# Patient Record
Sex: MALE | Race: WHITE | NOT HISPANIC OR LATINO | Employment: OTHER | URBAN - METROPOLITAN AREA
[De-identification: names, ages, dates, MRNs, and addresses within clinical notes are randomized per-mention and may not be internally consistent; named-entity substitution may affect disease eponyms.]

---

## 2017-01-06 ENCOUNTER — TELEPHONE (OUTPATIENT)
Dept: FAMILY MEDICINE | Facility: CLINIC | Age: 37
End: 2017-01-06

## 2017-01-06 ENCOUNTER — OFFICE VISIT (OUTPATIENT)
Dept: ORTHOPEDICS | Facility: CLINIC | Age: 37
End: 2017-01-06
Payer: COMMERCIAL

## 2017-01-06 VITALS — BODY MASS INDEX: 51.48 KG/M2 | WEIGHT: 309 LBS | TEMPERATURE: 98.5 F | HEIGHT: 65 IN

## 2017-01-06 DIAGNOSIS — M19.019 AC JOINT ARTHROPATHY: Primary | ICD-10-CM

## 2017-01-06 PROCEDURE — 99212 OFFICE O/P EST SF 10 MIN: CPT | Performed by: ORTHOPAEDIC SURGERY

## 2017-01-06 NOTE — PROGRESS NOTES
"HISTORY OF PRESENT ILLNESS:    Waqas Allen is a 36 year old male who is seen in follow up for   Chief Complaint   Patient presents with     RECHECK     Discuss right shoulder surgery.   Present symptoms: Patient had left shoulder surgery done last year and is feeling very well. He was hopeful to get his right shoulder done prior to the end of the year but he was too busy with work. He is no hopeful to get it done before the end of January so he has time to recover before his work season becomes busy again.  Patient states he has maintained range of motion but does have discomfort.  Family present:  Wife  Patient evaluation done with Dr. Duckworth    Physical Exam:  Vitals: Temp(Src) 98.5  F (36.9  C) (Temporal)  Ht 1.638 m (5' 4.5\")  Wt 140.161 kg (309 lb)  BMI 52.24 kg/m2  BMI= Body mass index is 52.24 kg/(m^2).  Constitutional: healthy, alert and no acute distress   Psychiatric: mentation appears normal and affect normal/bright  NEURO: no focal deficits  SKIN: no excoriation or erythema. No signs of infection.  JOINT/EXTREMITIES:  Affected extremity pulses are easily palpable.  SHOULDER Exam-Right   Inspection: No asymmetry   Tenderness of: A.c. Joint on the right. Patient has very limited tenderness on the left over the a.c. joint   Range of Motion: Active- Patient was symmetrical motion bilaterally. He is able to raise both arms overhead to approximately 160  limited by habitus Abduction is similar. External rotation bilaterally to 60 .Internal rotation bilaterally high lumbar   Strength: Patient maintains good strength     IMAGING INTERPRETATION:  Right shoulder X-rays were reviewed. Patient with some subacromial spurring.  There is a gap within the a.c. Joint however it is minimal  Independent visualization of the images was performed.     ASSESSMENT:    Chief Complaint   Patient presents with     RECHECK     Discuss right shoulder surgery.       ICD-10-CM    1. AC joint arthropathy M25.9      Patient with " right shoulder a.c. Joint arthropathy. Patient had the previous issue going on on his left side which was improved with subacromial decompression and distal clavicle excision.  His range of motion is good. Patient with painful range of motion however.    Plan:   We'll schedule patient for right shoulder arthroscopy. Patient was reminded as an outpatient procedure, shoulder block is offered, pendulum exercises for the first couple weeks and then active range of motion  Patient states awareness of what he will expect  Follow-up 10 days postop    Scribed by  Tali Mauricio PA-C   2017  10:13 AM      I attest I have seen and evaluated the patient.  I agree with above impression and plan.    Watson Duckworth MD    Please schedule for surgery, pre op H&P, and post ops.      Patient Name:  Waqas Allen ([id number]).  :  1980  Gender:  male  Patient Type:  Same Day Surgery  Surgeon:  Watson Duckworth MD  Physician requests assist from:  PA    Procedures:    Right shoulder arthroscopy with distal clavicle excision and subacromial decompression   Diagnosis:  AC joint arthropathy  Anesthesia:  General  Block:  Yes - Given by  CRNA  Block type: Shoulder  Time needed:  60 minutes    [facility] Discussed:  Not Applicable    Post op 1:

## 2017-01-06 NOTE — TELEPHONE ENCOUNTER
Surgery Scheduled    Date of Surgery 2/1 Time of Surgery   Procedure: right shoulder arthroscopy distal clavicle excision + sub decompression  Hospital/Surgical Facility: [facility]  Surgeon: Donita  Type of Anesthesia : General  Pre-op 1/18 with Deepali  2 week post op:2/16 with Donita        Surgery packet mailed to patient's home address. Patients instructed to arrive 1 hours prior to surgery. Patient understood and agrees to plan.    Debbie Fernando  Surgical Scheduler

## 2017-01-06 NOTE — NURSING NOTE
"Chief Complaint   Patient presents with     RECHECK     Discuss right shoulder surgery.       Initial Temp(Src) 98.5  F (36.9  C) (Temporal)  Ht 1.638 m (5' 4.5\")  Wt 140.161 kg (309 lb)  BMI 52.24 kg/m2 Estimated body mass index is 52.24 kg/(m^2) as calculated from the following:    Height as of this encounter: 1.638 m (5' 4.5\").    Weight as of this encounter: 140.161 kg (309 lb).  BP completed using cuff size: NA (Not Taken)  SPENCER Moore  "

## 2017-01-09 ENCOUNTER — OFFICE VISIT (OUTPATIENT)
Dept: FAMILY MEDICINE | Facility: OTHER | Age: 37
End: 2017-01-09
Payer: COMMERCIAL

## 2017-01-09 VITALS
SYSTOLIC BLOOD PRESSURE: 128 MMHG | DIASTOLIC BLOOD PRESSURE: 82 MMHG | TEMPERATURE: 98.2 F | OXYGEN SATURATION: 98 % | RESPIRATION RATE: 24 BRPM | HEIGHT: 64 IN | BODY MASS INDEX: 52.4 KG/M2 | HEART RATE: 90 BPM | WEIGHT: 306.9 LBS

## 2017-01-09 DIAGNOSIS — I10 HYPERTENSION GOAL BP (BLOOD PRESSURE) < 140/90: Primary | ICD-10-CM

## 2017-01-09 DIAGNOSIS — E11.9 TYPE 2 DIABETES MELLITUS WITHOUT COMPLICATION, WITHOUT LONG-TERM CURRENT USE OF INSULIN (H): ICD-10-CM

## 2017-01-09 LAB
ALBUMIN SERPL-MCNC: 3.7 G/DL (ref 3.4–5)
ALP SERPL-CCNC: 141 U/L (ref 40–150)
ALT SERPL W P-5'-P-CCNC: 25 U/L (ref 0–70)
ANION GAP SERPL CALCULATED.3IONS-SCNC: 5 MMOL/L (ref 3–14)
AST SERPL W P-5'-P-CCNC: 14 U/L (ref 0–45)
BILIRUB SERPL-MCNC: 0.6 MG/DL (ref 0.2–1.3)
BUN SERPL-MCNC: 15 MG/DL (ref 7–30)
CALCIUM SERPL-MCNC: 8.3 MG/DL (ref 8.5–10.1)
CHLORIDE SERPL-SCNC: 104 MMOL/L (ref 94–109)
CO2 SERPL-SCNC: 30 MMOL/L (ref 20–32)
CREAT SERPL-MCNC: 0.93 MG/DL (ref 0.66–1.25)
CREAT UR-MCNC: 294 MG/DL
GFR SERPL CREATININE-BSD FRML MDRD: ABNORMAL ML/MIN/1.7M2
GLUCOSE SERPL-MCNC: 145 MG/DL (ref 70–99)
HBA1C MFR BLD: 8.3 % (ref 4.3–6)
LDLC SERPL DIRECT ASSAY-MCNC: 58 MG/DL
MICROALBUMIN UR-MCNC: 207 MG/L
MICROALBUMIN/CREAT UR: 70.41 MG/G CR (ref 0–17)
POTASSIUM SERPL-SCNC: 4 MMOL/L (ref 3.4–5.3)
PROT SERPL-MCNC: 7.5 G/DL (ref 6.8–8.8)
SODIUM SERPL-SCNC: 139 MMOL/L (ref 133–144)
TSH SERPL DL<=0.05 MIU/L-ACNC: 2.84 MU/L (ref 0.4–4)

## 2017-01-09 PROCEDURE — 99000 SPECIMEN HANDLING OFFICE-LAB: CPT | Performed by: FAMILY MEDICINE

## 2017-01-09 PROCEDURE — 83721 ASSAY OF BLOOD LIPOPROTEIN: CPT | Performed by: FAMILY MEDICINE

## 2017-01-09 PROCEDURE — 99214 OFFICE O/P EST MOD 30 MIN: CPT | Performed by: FAMILY MEDICINE

## 2017-01-09 PROCEDURE — 36415 COLL VENOUS BLD VENIPUNCTURE: CPT | Performed by: FAMILY MEDICINE

## 2017-01-09 PROCEDURE — 80053 COMPREHEN METABOLIC PANEL: CPT | Performed by: FAMILY MEDICINE

## 2017-01-09 PROCEDURE — 82043 UR ALBUMIN QUANTITATIVE: CPT | Performed by: FAMILY MEDICINE

## 2017-01-09 PROCEDURE — 84443 ASSAY THYROID STIM HORMONE: CPT | Performed by: FAMILY MEDICINE

## 2017-01-09 PROCEDURE — 99207 C FOOT EXAM  NO CHARGE: CPT | Performed by: FAMILY MEDICINE

## 2017-01-09 PROCEDURE — 83036 HEMOGLOBIN GLYCOSYLATED A1C: CPT | Performed by: FAMILY MEDICINE

## 2017-01-09 RX ORDER — GLIPIZIDE 5 MG/1
10 TABLET ORAL
Qty: 30 TABLET | Refills: 5 | COMMUNITY
Start: 2017-01-09 | End: 2017-05-22 | Stop reason: DRUGHIGH

## 2017-01-09 ASSESSMENT — PAIN SCALES - GENERAL: PAINLEVEL: NO PAIN (0)

## 2017-01-09 NOTE — Clinical Note
[facility]  [address] [facility] [address]  Phone: [phone]          January 10, 2017    Waqas Allen  [address] [place] [address]          Dear Waqas,      LAB RESULTS:     The results of your recent A1C was 8.3 and urine protein has improved.  If you have any further questions or problems, please contact our office.          Sincerely,      KISHORE Palumbo M.D.

## 2017-01-09 NOTE — PROGRESS NOTES
SUBJECTIVE:  Waqas Allen is a 36-year-old man in for followup of his blood pressure and diabetes.  I have not seen him for over a year.  At that time, we found his diabetes actually even prior to that.  Initially we started him on metformin, but he was not able to tolerate that because of the diarrhea in spite of numerous dose adjustments in attempts.  He has now been switched over to Actos, which he seems to be tolerating and is also on 5 mg of glipizide twice daily.  He has not been checking blood sugars.  He was recently seen by Dr. Paniagua because of skin infections and was told this was likely due to poor diabetic control and recommended he be checked.  He is being seen by ortho for a bad shoulder and is scheduled for a distal clavicular surgery for that in the next few weeks.  He has had a longstanding problem with active acne type skin infections, occluded ducts and abscesses for many years.  He was placed on Bactrim by Dr. Paniagua without a whole lot of improvement and he has gotten his most improvement from minocycline which he has used intermittently but most of the time for a long while.  He get worse as soon as he goes off it.  He has had some dark pigmented discoloration of his forehead but he is adamant that he has had that even before starting the minocycline.        OBJECTIVE:  A1c today is 8.3, markedly better than anticipated.  He is a very large man. Heart today is regular, no murmurs.  No carotid bruits.  Lungs sound clear.  He does have some pocketed skin infection but it is improved.      PLAN:  I am going to recommend increasing his glipizide from 5 mg twice daily to 10 mg twice daily, stay on the Actos, recheck an A1c in 3 months.  He will continue on the minocycline as needed.  Other lab battery is drawn and I will notify him of the results.         KENTON MANSFIELD M.D.             D: 01/09/2017 15:10   T: 01/09/2017 15:34   MT: MARLENE[address][id number]      Name:     WAQAS ALLEN   MRN:       [id number]        Account:      [id number]   :      1980           Visit Date:   2017      Document: [id number]

## 2017-01-09 NOTE — NURSING NOTE
"Chief Complaint   Patient presents with     Diabetes     Hypertension       Initial /82 mmHg  Pulse 90  Temp(Src) 98.2  F (36.8  C) (Temporal)  Resp 24  Ht 5' 4\" (1.626 m)  Wt 306 lb 14.4 oz (139.209 kg)  BMI 52.65 kg/m2  SpO2 98% Estimated body mass index is 52.65 kg/(m^2) as calculated from the following:    Height as of this encounter: 5' 4\" (1.626 m).    Weight as of this encounter: 306 lb 14.4 oz (139.209 kg).  BP completed using cuff size: large    "

## 2017-01-09 NOTE — PROGRESS NOTES
"SUBJECTIVE:                                                    Waqas Allen is a 36 year old male who presents to clinic today for the following health issues:    Diabetes Follow-up      Patient is checking blood sugars: not at all    Diabetic concerns: frequent infections of skin (boils)     Symptoms of hypoglycemia (low blood sugar): none     Paresthesias (numbness or burning in feet) or sores: Yes feels he has pain from \"flat feet\"     Date of last diabetic eye exam: couple years ago     Hypertension Follow-up      Outpatient blood pressures are not being checked.    Low Salt Diet: not monitoring salt       Amount of exercise or physical activity: None    Problems taking medications regularly: No    Medication side effects: none    Diet: regular (no restrictions)    Problem list and histories reviewed & adjusted, as indicated.    C: NEGATIVE for fever, chills, change in weight  INTEGUMENTARY/SKIN: severe recurrent skin follocle infections,diabetes wih elevted a1c,double glipize. Recheck A1C in 3 months,warned aout hypoglycemia  E/M: NEGATIVE for ear, mouth and throat problems  R: NEGATIVE for significant cough or SOB  CV: NEGATIVE for chest pain, palpitations or peripheral edema    OBJECTIVE:                                                    /82 mmHg  Pulse 90  Temp(Src) 98.2  F (36.8  C) (Temporal)  Resp 24  Ht 5' 4\" (1.626 m)  Wt 306 lb 14.4 oz (139.209 kg)  BMI 52.65 kg/m2  SpO2 98%  Body mass index is 52.65 kg/(m^2).    See dictated note     ASSESSMENT/PLAN:                                                        Michael Palumbo MD, MD  [facility]          "

## 2017-01-09 NOTE — MR AVS SNAPSHOT
After Visit Summary   1/9/2017    Waqas Allen    MRN: [id number]           Patient Information     Date Of Birth          1980        Visit Information        Provider Department      1/9/2017 2:00 PM Michael Palumbo MD [facility]        Today's Diagnoses     Hypertension goal BP (blood pressure) < 140/90    -  1     Type 2 diabetes mellitus without complication, without long-term current use of insulin (H)            Follow-ups after your visit        Your next 10 appointments already scheduled     Jan 18, 2017  3:30 PM   Pre-Op physical with Michael Palumbo MD   [facility] ([facility])    [address]  [facility] [address]   [phone]            Feb 01, 2017   Procedure with Watson Duckworth MD   [facility] ([facility])    [address] [place] [id number] [phone]           From [facility] [address]: Exit at [address] [employer] [address] on south side of [place]. Turn right on [address]. Turn left at stoplight on [address]. [facility] will be in view two blocks ahead            Feb 16, 2017  2:00 PM   Return Visit with Watson Duckworth MD   [facility] ([facility]    [address] [facility] [address] [facility] [id number] [phone]              Who to contact     If you have questions or need follow up information about today's clinic visit or your schedule please contact [facility] directly at [phone].  Normal or non-critical lab and imaging results will be communicated to you by Corporamahart, letter or phone within 4 business days after the clinic has received the results. If you do not hear from us within 7 days, please contact the clinic through Corporamahart or phone. If you have a critical or abnormal lab result, we will notify you by phone as soon as possible.  Submit refill requests through [email or web address] or call your pharmacy and they will  "forward the refill request to us. Please allow 3 business days for your refill to be completed.          Additional Information About Your Visit        BeyondTrusthareCareer Information     [employer]out[employer] gives you secure access to your electronic health record. If you see a primary care provider, you can also send messages to your care team and make appointments. If you have questions, please call your primary care clinic.  If you do not have a primary care provider, please call [phone] and they will assist you.        Care EveryWhere ID     This is your Care EveryWhere ID. This could be used by other organizations to access your [facility] medical records  [id number]        Your Vitals Were     Pulse Temperature Respirations Height BMI (Body Mass Index) Pulse Oximetry    90 98.2  F (36.8  C) (Temporal) 24 5' 4\" (1.626 m) 52.65 kg/m2 98%       Blood Pressure from Last 3 Encounters:   01/09/17 128/82   09/23/16 128/74   01/20/16 133/80    Weight from Last 3 Encounters:   01/09/17 306 lb 14.4 oz (139.209 kg)   01/06/17 309 lb (140.161 kg)   09/23/16 302 lb (136.986 kg)              We Performed the Following     Albumin Random Urine Quantitative     C FOOT EXAM  NO CHARGE     Comprehensive metabolic panel     EYE EXAM (SIMPLE-NONBILLABLE)     Hemoglobin A1c     LDL cholesterol direct     TSH          Today's Medication Changes          These changes are accurate as of: 1/9/17  3:02 PM.  If you have any questions, ask your nurse or doctor.               These medicines have changed or have updated prescriptions.        Dose/Directions    glipiZIDE 5 MG tablet   Commonly known as:  GLUCOTROL   This may have changed:  how much to take   Used for:  Type 2 diabetes mellitus without complication, without long-term current use of insulin (H)   Changed by:  Michael Palumbo MD        Dose:  10 mg   Take 2 tablets (10 mg) by mouth 2 times daily (before meals)   Quantity:  30 tablet   Refills:  5                Primary Care Provider " Office Phone # Fax #    Michael Palumbo [phone]       [facility] [address] [facility] [address]        Thank you!     Thank you for choosing [facility]  for your care. Our goal is always to provide you with excellent care. Hearing back from our patients is one way we can continue to improve our services. Please take a few minutes to complete the written survey that you may receive in the mail after your visit with us. Thank you!             Your Updated Medication List - Protect others around you: Learn how to safely use, store and throw away your medicines at www.disposemymeds.org.          This list is accurate as of: 1/9/17  3:02 PM.  Always use your most recent med list.                   Brand Name Dispense Instructions for use    ascorbic acid 500 MG tablet    VITAMIN C     Take 500 mg by mouth 2 times daily       aspirin 81 MG tablet     100    ONE DAILY       blood glucose monitoring lancets     3 Box    1 each 2 times daily       blood glucose monitoring test strip    ACCU-CHEK SMARTVIEW    100 each    1 strip by In Vitro route 2 times daily       glipiZIDE 5 MG tablet    GLUCOTROL    30 tablet    Take 2 tablets (10 mg) by mouth 2 times daily (before meals)       losartan 50 MG tablet    COZAAR    90 tablet    TAKE 1 TABLET BY MOUTH EVER Y DAY       minocycline 100 MG capsule    MINOCIN/DYNACIN    60 capsule    TAKE 1 CAPSULE BY MOUTH TWI CE A DAY       pioglitazone 15 MG tablet    ACTOS    30 tablet    Take 1 tablet (15 mg) by mouth daily       POTASSIUM GLUCONATE PO      Take 1 tablet by mouth daily       ranitidine 150 MG tablet    ZANTAC     Take 150 mg by mouth daily       zinc 50 MG Tabs      Take 1 tablet by mouth daily

## 2017-01-18 ENCOUNTER — OFFICE VISIT (OUTPATIENT)
Dept: FAMILY MEDICINE | Facility: OTHER | Age: 37
End: 2017-01-18
Payer: COMMERCIAL

## 2017-01-18 VITALS
BODY MASS INDEX: 53.28 KG/M2 | HEIGHT: 64 IN | DIASTOLIC BLOOD PRESSURE: 78 MMHG | HEART RATE: 104 BPM | OXYGEN SATURATION: 99 % | TEMPERATURE: 97.9 F | RESPIRATION RATE: 22 BRPM | WEIGHT: 312.1 LBS | SYSTOLIC BLOOD PRESSURE: 128 MMHG

## 2017-01-18 DIAGNOSIS — Z01.810 PRE-OPERATIVE CARDIOVASCULAR EXAMINATION: Primary | ICD-10-CM

## 2017-01-18 LAB
ERYTHROCYTE [DISTWIDTH] IN BLOOD BY AUTOMATED COUNT: 12.8 % (ref 10–15)
HCT VFR BLD AUTO: 44 % (ref 40–53)
HGB BLD-MCNC: 14.9 G/DL (ref 13.3–17.7)
MCH RBC QN AUTO: 29.2 PG (ref 26.5–33)
MCHC RBC AUTO-ENTMCNC: 33.9 G/DL (ref 31.5–36.5)
MCV RBC AUTO: 86 FL (ref 78–100)
PLATELET # BLD AUTO: 272 10E9/L (ref 150–450)
RBC # BLD AUTO: 5.1 10E12/L (ref 4.4–5.9)
WBC # BLD AUTO: 9.6 10E9/L (ref 4–11)

## 2017-01-18 PROCEDURE — 85027 COMPLETE CBC AUTOMATED: CPT | Performed by: FAMILY MEDICINE

## 2017-01-18 PROCEDURE — 99214 OFFICE O/P EST MOD 30 MIN: CPT | Performed by: FAMILY MEDICINE

## 2017-01-18 PROCEDURE — 36415 COLL VENOUS BLD VENIPUNCTURE: CPT | Performed by: FAMILY MEDICINE

## 2017-01-18 ASSESSMENT — PAIN SCALES - GENERAL: PAINLEVEL: MILD PAIN (2)

## 2017-01-18 NOTE — MR AVS SNAPSHOT
After Visit Summary   1/18/2017    Waqas Allen    MRN: [id number]           Patient Information     Date Of Birth          1980        Visit Information        Provider Department      1/18/2017 3:30 PM Michael Palumbo MD [facility]        Today's Diagnoses     Pre-operative cardiovascular examination    -  1        Follow-ups after your visit        Your next 10 appointments already scheduled     Feb 01, 2017   Procedure with Watson Duckworth MD   [facility] ([facility])    [address] [id number] [phone]           From [address]: Exit at [employer] [address] on [place] side of [place]. Turn right on [address]. Turn left at stoplight on [address]. [facility] will be in view two blocks ahead            Feb 16, 2017  2:00 PM   Return Visit with Watson Duckworth MD   [facility] ([facility])    [address] [id number] [phone]              Who to contact     If you have questions or need follow up information about today's clinic visit or your schedule please contact [facility] directly at [phone].  Normal or non-critical lab and imaging results will be communicated to you by MyChart, letter or phone within 4 business days after the clinic has received the results. If you do not hear from us within 7 days, please contact the clinic through HotGrindshart or phone. If you have a critical or abnormal lab result, we will notify you by phone as soon as possible.  Submit refill requests through Meta[employer] Software or call your pharmacy and they will forward the refill request to us. Please allow 3 business days for your refill to be completed.          Additional Information About Your Visit        HotGrindshart Information     [employer] gives you secure access to your electronic health record. If you see a primary care provider, you can also send  "messages to your care team and make appointments. If you have questions, please call your primary care clinic.  If you do not have a primary care provider, please call [phone] and they will assist you.        Care EveryWhere ID     This is your Care EveryWhere ID. This could be used by other organizations to access your [facility] medical records  [id number]        Your Vitals Were     Pulse Temperature Respirations Height BMI (Body Mass Index) Pulse Oximetry    104 97.9  F (36.6  C) (Temporal) 22 5' 4\" (1.626 m) 53.55 kg/m2 99%       Blood Pressure from Last 3 Encounters:   01/18/17 128/78   01/09/17 128/82   09/23/16 128/74    Weight from Last 3 Encounters:   01/18/17 312 lb 1.6 oz (141.568 kg)   01/09/17 306 lb 14.4 oz (139.209 kg)   01/06/17 309 lb (140.161 kg)              We Performed the Following     CBC with platelets        Primary Care Provider Office Phone # Fax #    Michael Palumbo [phone]       [facility] [address] [facility] [address]        Thank you!     Thank you for choosing [facility]  for your care. Our goal is always to provide you with excellent care. Hearing back from our patients is one way we can continue to improve our services. Please take a few minutes to complete the written survey that you may receive in the mail after your visit with us. Thank you!             Your Updated Medication List - Protect others around you: Learn how to safely use, store and throw away your medicines at www.disposemymeds.org.          This list is accurate as of: 1/18/17  4:38 PM.  Always use your most recent med list.                   Brand Name Dispense Instructions for use    ascorbic acid 500 MG tablet    VITAMIN C     Take 500 mg by mouth 2 times daily       aspirin 81 MG tablet     100    ONE DAILY       blood glucose monitoring lancets     3 Box    1 each 2 times daily       blood glucose monitoring test strip    ACCU-CHEK SMARTVIEW    " 100 each    1 strip by In Vitro route 2 times daily       glipiZIDE 5 MG tablet    GLUCOTROL    30 tablet    Take 2 tablets (10 mg) by mouth 2 times daily (before meals)       losartan 50 MG tablet    COZAAR    90 tablet    TAKE 1 TABLET BY MOUTH EVER Y DAY       minocycline 100 MG capsule    MINOCIN/DYNACIN    60 capsule    TAKE 1 CAPSULE BY MOUTH TWI CE A DAY       pioglitazone 15 MG tablet    ACTOS    30 tablet    Take 1 tablet (15 mg) by mouth daily       POTASSIUM GLUCONATE PO      Take 1 tablet by mouth daily       ranitidine 150 MG tablet    ZANTAC     Take 150 mg by mouth daily       zinc 50 MG Tabs      Take 1 tablet by mouth daily

## 2017-01-18 NOTE — NURSING NOTE
"Chief Complaint   Patient presents with     Pre-Op Exam     DOS 2/1/17       Initial /78 mmHg  Pulse 104  Temp(Src) 97.9  F (36.6  C) (Temporal)  Resp 22  Ht 5' 4\" (1.626 m)  Wt 312 lb 1.6 oz (141.568 kg)  BMI 53.55 kg/m2  SpO2 99% Estimated body mass index is 53.55 kg/(m^2) as calculated from the following:    Height as of this encounter: 5' 4\" (1.626 m).    Weight as of this encounter: 312 lb 1.6 oz (141.568 kg).  BP completed using cuff size: large    "
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
## 2017-01-31 ENCOUNTER — ANESTHESIA EVENT (OUTPATIENT)
Dept: SURGERY | Facility: CLINIC | Age: 37
End: 2017-01-31
Payer: COMMERCIAL

## 2017-01-31 NOTE — ANESTHESIA PREPROCEDURE EVALUATION
Anesthesia Evaluation     . Pt has had prior anesthetic. Type: General and MAC    No history of anesthetic complications     ROS/MED HX    ENT/Pulmonary:     (+)GIAN risk factors hypertension, obese, doesn't use CPAP , . .    Neurologic:  - neg neurologic ROS     Cardiovascular:     (+) hypertension----. : . . . :. .       METS/Exercise Tolerance:  4 - Raking leaves, gardening   Hematologic:  - neg hematologic  ROS       Musculoskeletal:   (+) , , other musculoskeletal- low back pain      GI/Hepatic:  - neg GI/hepatic ROS      (-) GERD   Renal/Genitourinary:  - ROS Renal section negative       Endo:     (+) type II DM Last HgA1c: 8.3 date: 01/09/2017 Not using insulin not previously admitted for DM/DKA Diabetic complications: cardiac problems, Obesity, .      Psychiatric:  - neg psychiatric ROS       Infectious Disease:  - neg infectious disease ROS       Malignancy:      - no malignancy   Other:    (+) No chance of pregnancy C-spine cleared: Yes, H/O Chronic Pain,H/O chronic opiod use , no other significant disability              Physical Exam  Normal systems: pulmonary and dental    Airway   Mallampati: IV  TM distance: >3 FB  Neck ROM: full    Dental     Cardiovascular   Rhythm and rate: regular and normal      Pulmonary    breath sounds clear to auscultation                        Anesthesia Plan      History & Physical Review  History and physical reviewed and following examination; no interval change.    ASA Status:  3 .    NPO Status:  > 8 hours    Plan for General, Peripheral Nerve Block and ETT with Intravenous and Propofol induction. Maintenance will be Balanced and Inhalation.    PONV prophylaxis:  Ondansetron (or other 5HT-3) and Dexamethasone or Solumedrol  Additional equipment: Videolaryngoscope Will use Glidescope as increased BMI and may be a difficult airway      Postoperative Care  Postoperative pain management:  .  Plan for postoperative opioid use.    Consents  Anesthetic plan, risks, benefits  and alternatives discussed with:  Patient or representative and Patient..                          .
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
## 2017-02-01 ENCOUNTER — ANESTHESIA (OUTPATIENT)
Dept: SURGERY | Facility: CLINIC | Age: 37
End: 2017-02-01
Payer: COMMERCIAL

## 2017-02-01 ENCOUNTER — HOSPITAL ENCOUNTER (OUTPATIENT)
Facility: CLINIC | Age: 37
Discharge: HOME | End: 2017-02-01
Attending: ORTHOPAEDIC SURGERY | Admitting: ORTHOPAEDIC SURGERY
Payer: COMMERCIAL

## 2017-02-01 VITALS
WEIGHT: 312.1 LBS | OXYGEN SATURATION: 92 % | BODY MASS INDEX: 53.55 KG/M2 | DIASTOLIC BLOOD PRESSURE: 78 MMHG | RESPIRATION RATE: 23 BRPM | SYSTOLIC BLOOD PRESSURE: 144 MMHG | TEMPERATURE: 98.1 F

## 2017-02-01 DIAGNOSIS — M19.019 AC JOINT ARTHROPATHY: Primary | ICD-10-CM

## 2017-02-01 DIAGNOSIS — G89.18 POST-OP PAIN: ICD-10-CM

## 2017-02-01 LAB
GLUCOSE BLDC GLUCOMTR-MCNC: 198 MG/DL (ref 70–99)
GLUCOSE BLDC GLUCOMTR-MCNC: 246 MG/DL (ref 70–99)

## 2017-02-01 PROCEDURE — 25000566 ZZH SEVOFLURANE, EA 15 MIN: Performed by: ORTHOPAEDIC SURGERY

## 2017-02-01 PROCEDURE — 25800025 ZZH RX 258: Performed by: NURSE ANESTHETIST, CERTIFIED REGISTERED

## 2017-02-01 PROCEDURE — 71000027 ZZH RECOVERY PHASE 2 EACH 15 MINS: Performed by: ORTHOPAEDIC SURGERY

## 2017-02-01 PROCEDURE — 25000125 ZZHC RX 250: Performed by: NURSE ANESTHETIST, CERTIFIED REGISTERED

## 2017-02-01 PROCEDURE — 25000132 ZZH RX MED GY IP 250 OP 250 PS 637: Performed by: NURSE ANESTHETIST, CERTIFIED REGISTERED

## 2017-02-01 PROCEDURE — 29824 SHO ARTHRS SRG DSTL CLAVICLC: CPT | Mod: AS | Performed by: PHYSICIAN ASSISTANT

## 2017-02-01 PROCEDURE — 25000128 H RX IP 250 OP 636: Performed by: NURSE ANESTHETIST, CERTIFIED REGISTERED

## 2017-02-01 PROCEDURE — 25000128 H RX IP 250 OP 636: Mod: ZNDC | Performed by: PHYSICIAN ASSISTANT

## 2017-02-01 PROCEDURE — 71000014 ZZH RECOVERY PHASE 1 LEVEL 2 FIRST HR: Performed by: ORTHOPAEDIC SURGERY

## 2017-02-01 PROCEDURE — 82962 GLUCOSE BLOOD TEST: CPT | Mod: 91

## 2017-02-01 PROCEDURE — 27210794 ZZH OR GENERAL SUPPLY STERILE: Performed by: ORTHOPAEDIC SURGERY

## 2017-02-01 PROCEDURE — 36000056 ZZH SURGERY LEVEL 3 1ST 30 MIN: Performed by: ORTHOPAEDIC SURGERY

## 2017-02-01 PROCEDURE — 29826 SHO ARTHRS SRG DECOMPRESSION: CPT | Mod: AS | Performed by: PHYSICIAN ASSISTANT

## 2017-02-01 PROCEDURE — 29824 SHO ARTHRS SRG DSTL CLAVICLC: CPT | Mod: RT | Performed by: ORTHOPAEDIC SURGERY

## 2017-02-01 PROCEDURE — 37000009 ZZH ANESTHESIA TECHNICAL FEE, EACH ADDTL 15 MIN: Performed by: ORTHOPAEDIC SURGERY

## 2017-02-01 PROCEDURE — 40000306 ZZH STATISTIC PRE PROC ASSESS II: Performed by: ORTHOPAEDIC SURGERY

## 2017-02-01 PROCEDURE — 36000058 ZZH SURGERY LEVEL 3 EA 15 ADDTL MIN: Performed by: ORTHOPAEDIC SURGERY

## 2017-02-01 PROCEDURE — 27110028 ZZH OR GENERAL SUPPLY NON-STERILE: Performed by: ORTHOPAEDIC SURGERY

## 2017-02-01 PROCEDURE — 37000008 ZZH ANESTHESIA TECHNICAL FEE, 1ST 30 MIN: Performed by: ORTHOPAEDIC SURGERY

## 2017-02-01 PROCEDURE — 29826 SHO ARTHRS SRG DECOMPRESSION: CPT | Mod: RT | Performed by: ORTHOPAEDIC SURGERY

## 2017-02-01 RX ORDER — IBUPROFEN 600 MG/1
600 TABLET, FILM COATED ORAL EVERY 6 HOURS PRN
Qty: 30 TABLET | Refills: 0 | Status: SHIPPED | OUTPATIENT
Start: 2017-02-01 | End: 2017-05-22

## 2017-02-01 RX ORDER — ONDANSETRON 2 MG/ML
INJECTION INTRAMUSCULAR; INTRAVENOUS PRN
Status: DISCONTINUED | OUTPATIENT
Start: 2017-02-01 | End: 2017-02-01

## 2017-02-01 RX ORDER — AMOXICILLIN 250 MG
1-2 CAPSULE ORAL 2 TIMES DAILY
Qty: 30 TABLET | Refills: 0 | Status: SHIPPED | OUTPATIENT
Start: 2017-02-01 | End: 2017-05-22

## 2017-02-01 RX ORDER — CEFAZOLIN SODIUM 1 G/50ML
3 SOLUTION INTRAVENOUS
Status: COMPLETED | OUTPATIENT
Start: 2017-02-01 | End: 2017-02-01

## 2017-02-01 RX ORDER — METHOCARBAMOL 750 MG/1
750 TABLET, FILM COATED ORAL EVERY 6 HOURS PRN
Qty: 60 TABLET | Refills: 0 | Status: SHIPPED | OUTPATIENT
Start: 2017-02-01 | End: 2017-02-16

## 2017-02-01 RX ORDER — HYDRALAZINE HYDROCHLORIDE 20 MG/ML
2.5-5 INJECTION INTRAMUSCULAR; INTRAVENOUS EVERY 10 MIN PRN
Status: DISCONTINUED | OUTPATIENT
Start: 2017-02-01 | End: 2017-02-01 | Stop reason: HOSPADM

## 2017-02-01 RX ORDER — OXYCODONE HYDROCHLORIDE 5 MG/1
5-10 TABLET ORAL
Qty: 70 TABLET | Refills: 0 | Status: SHIPPED | OUTPATIENT
Start: 2017-02-01 | End: 2017-02-16

## 2017-02-01 RX ORDER — DIMENHYDRINATE 50 MG/ML
INJECTION, SOLUTION INTRAMUSCULAR; INTRAVENOUS PRN
Status: DISCONTINUED | OUTPATIENT
Start: 2017-02-01 | End: 2017-02-01

## 2017-02-01 RX ORDER — METHOCARBAMOL 750 MG/1
750 TABLET, FILM COATED ORAL
Status: DISCONTINUED | OUTPATIENT
Start: 2017-02-01 | End: 2017-02-01 | Stop reason: HOSPADM

## 2017-02-01 RX ORDER — BUPIVACAINE HYDROCHLORIDE AND EPINEPHRINE 5; 5 MG/ML; UG/ML
INJECTION, SOLUTION PERINEURAL PRN
Status: DISCONTINUED | OUTPATIENT
Start: 2017-02-01 | End: 2017-02-01

## 2017-02-01 RX ORDER — LIDOCAINE 40 MG/G
CREAM TOPICAL
Status: DISCONTINUED | OUTPATIENT
Start: 2017-02-01 | End: 2017-02-01 | Stop reason: HOSPADM

## 2017-02-01 RX ORDER — GABAPENTIN 300 MG/1
300 CAPSULE ORAL AT BEDTIME
Qty: 30 CAPSULE | Refills: 1 | Status: SHIPPED | OUTPATIENT
Start: 2017-02-01 | End: 2017-02-16

## 2017-02-01 RX ORDER — MEPERIDINE HYDROCHLORIDE 25 MG/ML
12.5 INJECTION INTRAMUSCULAR; INTRAVENOUS; SUBCUTANEOUS
Status: DISCONTINUED | OUTPATIENT
Start: 2017-02-01 | End: 2017-02-01 | Stop reason: HOSPADM

## 2017-02-01 RX ORDER — SODIUM CHLORIDE, SODIUM LACTATE, POTASSIUM CHLORIDE, CALCIUM CHLORIDE 600; 310; 30; 20 MG/100ML; MG/100ML; MG/100ML; MG/100ML
1000 INJECTION, SOLUTION INTRAVENOUS CONTINUOUS
Status: DISCONTINUED | OUTPATIENT
Start: 2017-02-01 | End: 2017-02-01 | Stop reason: HOSPADM

## 2017-02-01 RX ORDER — FENTANYL CITRATE 50 UG/ML
INJECTION, SOLUTION INTRAMUSCULAR; INTRAVENOUS PRN
Status: DISCONTINUED | OUTPATIENT
Start: 2017-02-01 | End: 2017-02-01

## 2017-02-01 RX ORDER — LIDOCAINE HYDROCHLORIDE 20 MG/ML
INJECTION, SOLUTION INFILTRATION; PERINEURAL PRN
Status: DISCONTINUED | OUTPATIENT
Start: 2017-02-01 | End: 2017-02-01

## 2017-02-01 RX ORDER — ONDANSETRON 4 MG/1
4 TABLET, ORALLY DISINTEGRATING ORAL EVERY 30 MIN PRN
Status: DISCONTINUED | OUTPATIENT
Start: 2017-02-01 | End: 2017-02-01 | Stop reason: HOSPADM

## 2017-02-01 RX ORDER — DIMENHYDRINATE 50 MG/ML
25 INJECTION, SOLUTION INTRAMUSCULAR; INTRAVENOUS
Status: DISCONTINUED | OUTPATIENT
Start: 2017-02-01 | End: 2017-02-01 | Stop reason: HOSPADM

## 2017-02-01 RX ORDER — DEXAMETHASONE SODIUM PHOSPHATE 10 MG/ML
INJECTION, SOLUTION INTRAMUSCULAR; INTRAVENOUS PRN
Status: DISCONTINUED | OUTPATIENT
Start: 2017-02-01 | End: 2017-02-01

## 2017-02-01 RX ORDER — ONDANSETRON 2 MG/ML
4 INJECTION INTRAMUSCULAR; INTRAVENOUS EVERY 30 MIN PRN
Status: DISCONTINUED | OUTPATIENT
Start: 2017-02-01 | End: 2017-02-01 | Stop reason: HOSPADM

## 2017-02-01 RX ORDER — ALBUTEROL SULFATE 90 UG/1
AEROSOL, METERED RESPIRATORY (INHALATION) PRN
Status: DISCONTINUED | OUTPATIENT
Start: 2017-02-01 | End: 2017-02-01

## 2017-02-01 RX ORDER — HYDROMORPHONE HYDROCHLORIDE 1 MG/ML
.2-.5 INJECTION, SOLUTION INTRAMUSCULAR; INTRAVENOUS; SUBCUTANEOUS EVERY 10 MIN PRN
Status: DISCONTINUED | OUTPATIENT
Start: 2017-02-01 | End: 2017-02-01 | Stop reason: HOSPADM

## 2017-02-01 RX ORDER — CEFAZOLIN SODIUM 1 G/3ML
1 INJECTION, POWDER, FOR SOLUTION INTRAMUSCULAR; INTRAVENOUS SEE ADMIN INSTRUCTIONS
Status: DISCONTINUED | OUTPATIENT
Start: 2017-02-01 | End: 2017-02-01 | Stop reason: HOSPADM

## 2017-02-01 RX ORDER — NEOSTIGMINE METHYLSULFATE 1 MG/ML
VIAL (ML) INJECTION PRN
Status: DISCONTINUED | OUTPATIENT
Start: 2017-02-01 | End: 2017-02-01

## 2017-02-01 RX ORDER — NALOXONE HYDROCHLORIDE 0.4 MG/ML
.1-.4 INJECTION, SOLUTION INTRAMUSCULAR; INTRAVENOUS; SUBCUTANEOUS
Status: DISCONTINUED | OUTPATIENT
Start: 2017-02-01 | End: 2017-02-01 | Stop reason: HOSPADM

## 2017-02-01 RX ORDER — PROPOFOL 10 MG/ML
INJECTION, EMULSION INTRAVENOUS PRN
Status: DISCONTINUED | OUTPATIENT
Start: 2017-02-01 | End: 2017-02-01

## 2017-02-01 RX ORDER — SODIUM CHLORIDE, SODIUM LACTATE, POTASSIUM CHLORIDE, CALCIUM CHLORIDE 600; 310; 30; 20 MG/100ML; MG/100ML; MG/100ML; MG/100ML
INJECTION, SOLUTION INTRAVENOUS CONTINUOUS
Status: DISCONTINUED | OUTPATIENT
Start: 2017-02-01 | End: 2017-02-01 | Stop reason: HOSPADM

## 2017-02-01 RX ORDER — FENTANYL CITRATE 50 UG/ML
25-50 INJECTION, SOLUTION INTRAMUSCULAR; INTRAVENOUS
Status: DISCONTINUED | OUTPATIENT
Start: 2017-02-01 | End: 2017-02-01 | Stop reason: HOSPADM

## 2017-02-01 RX ORDER — OXYCODONE HYDROCHLORIDE 5 MG/1
5-10 TABLET ORAL
Status: DISCONTINUED | OUTPATIENT
Start: 2017-02-01 | End: 2017-02-01 | Stop reason: HOSPADM

## 2017-02-01 RX ADMIN — HYDROMORPHONE HYDROCHLORIDE 0.5 MG: 1 INJECTION, SOLUTION INTRAMUSCULAR; INTRAVENOUS; SUBCUTANEOUS at 09:10

## 2017-02-01 RX ADMIN — SODIUM CHLORIDE, POTASSIUM CHLORIDE, SODIUM LACTATE AND CALCIUM CHLORIDE: 600; 310; 30; 20 INJECTION, SOLUTION INTRAVENOUS at 08:08

## 2017-02-01 RX ADMIN — MIDAZOLAM HYDROCHLORIDE 2 MG: 1 INJECTION, SOLUTION INTRAMUSCULAR; INTRAVENOUS at 07:29

## 2017-02-01 RX ADMIN — HYDROMORPHONE HYDROCHLORIDE 0.5 MG: 1 INJECTION, SOLUTION INTRAMUSCULAR; INTRAVENOUS; SUBCUTANEOUS at 08:27

## 2017-02-01 RX ADMIN — DIMENHYDRINATE 25 MG: 50 INJECTION, SOLUTION INTRAMUSCULAR; INTRAVENOUS at 08:23

## 2017-02-01 RX ADMIN — Medication 10 ML: at 09:29

## 2017-02-01 RX ADMIN — SUCCINYLCHOLINE CHLORIDE 160 MG: 20 INJECTION, SOLUTION INTRAMUSCULAR; INTRAVENOUS at 07:36

## 2017-02-01 RX ADMIN — SODIUM CHLORIDE, POTASSIUM CHLORIDE, SODIUM LACTATE AND CALCIUM CHLORIDE: 600; 310; 30; 20 INJECTION, SOLUTION INTRAVENOUS at 07:09

## 2017-02-01 RX ADMIN — ROCURONIUM BROMIDE 20 MG: 10 INJECTION INTRAVENOUS at 08:23

## 2017-02-01 RX ADMIN — PROPOFOL 250 MG: 10 INJECTION, EMULSION INTRAVENOUS at 07:36

## 2017-02-01 RX ADMIN — LIDOCAINE HYDROCHLORIDE 40 MG: 20 INJECTION, SOLUTION INFILTRATION; PERINEURAL at 07:36

## 2017-02-01 RX ADMIN — ONDANSETRON 4 MG: 2 INJECTION INTRAMUSCULAR; INTRAVENOUS at 08:23

## 2017-02-01 RX ADMIN — FENTANYL CITRATE 50 MCG: 50 INJECTION, SOLUTION INTRAMUSCULAR; INTRAVENOUS at 08:00

## 2017-02-01 RX ADMIN — Medication 10 ML: at 09:28

## 2017-02-01 RX ADMIN — NEOSTIGMINE METHYLSULFATE 5 MG: 1 INJECTION INTRAMUSCULAR; INTRAVENOUS; SUBCUTANEOUS at 08:42

## 2017-02-01 RX ADMIN — HYDROMORPHONE HYDROCHLORIDE 0.5 MG: 1 INJECTION, SOLUTION INTRAMUSCULAR; INTRAVENOUS; SUBCUTANEOUS at 08:30

## 2017-02-01 RX ADMIN — ROCURONIUM BROMIDE 30 MG: 10 INJECTION INTRAVENOUS at 07:45

## 2017-02-01 RX ADMIN — LIDOCAINE HYDROCHLORIDE 1 ML: 10 INJECTION, SOLUTION EPIDURAL; INFILTRATION; INTRACAUDAL; PERINEURAL at 07:09

## 2017-02-01 RX ADMIN — DEXAMETHASONE SODIUM PHOSPHATE 10 MG: 10 INJECTION, SOLUTION INTRAMUSCULAR; INTRAVENOUS at 09:28

## 2017-02-01 RX ADMIN — FENTANYL CITRATE 100 MCG: 50 INJECTION, SOLUTION INTRAMUSCULAR; INTRAVENOUS at 07:36

## 2017-02-01 RX ADMIN — FENTANYL CITRATE 50 MCG: 50 INJECTION, SOLUTION INTRAMUSCULAR; INTRAVENOUS at 09:25

## 2017-02-01 RX ADMIN — PROPOFOL 80 MG: 10 INJECTION, EMULSION INTRAVENOUS at 08:23

## 2017-02-01 RX ADMIN — ALBUTEROL SULFATE 8 PUFF: 90 AEROSOL, METERED RESPIRATORY (INHALATION) at 07:41

## 2017-02-01 RX ADMIN — CEFAZOLIN SODIUM 3 G: 1 INJECTION, POWDER, FOR SOLUTION INTRAMUSCULAR; INTRAVENOUS at 07:40

## 2017-02-01 NOTE — ANESTHESIA PROCEDURE NOTES
Peripheral nerve/Neuraxial procedure note : interscalene  Pre-Procedure  Performed by  KUSH SHERWOOD   Location: post-op      Pre-Anesthestic Checklist: patient identified, IV checked, site marked, risks and benefits discussed, informed consent, monitors and equipment checked, pre-op evaluation, at physician/surgeon's request and post-op pain management    Timeout  Correct Patient: Yes   Correct Procedure: Yes   Correct Site: Yes   Correct Laterality: Yes   Correct Position: Yes   Site Marked: Yes   .   Procedure Documentation    .    Procedure:    Interscalene.  Local skin infiltrated with 1 mL of 2% lidocaine.     Ultrasound used to identify targeted nerve, plexus, or vascular marker and placed a needle adjacent to it. A permanent image is entered into the patient's record.  Patient Prep;mask, sterile gloves, chlorhexidine gluconate and isopropyl alcohol.  Nerve Stim: Initial Level 0.5 mA.  Lowest motor response 0.3 mA..  Needle: insulated (22 G. 80 mm ). .  Spinal Needle: . . Insertion Method: Single Shot.     Assessment/Narrative  Paresthesias: No.  Injection made incrementally with aspirations every 5 mL..  The placement was negative for: blood aspirated, painful injection and site bleeding.  Bolus given via needle..   Secured via.   Complications: none. Comments:  Pt tolerated procedure well.  Pain to 0/10.  No complications noted.  Will follow if needed.    Block placed by STEFANIE Molina and directly supervised by KAREL Mathew

## 2017-02-01 NOTE — IP AVS SNAPSHOT
[facility]    [address]     [place] [address]    Phone:  [phone]                                       After Visit Summary   2/1/2017    Waqas Allen    MRN: [id number]           After Visit Summary Signature Page     I have received my discharge instructions, and my questions have been answered. I have discussed any challenges I see with this plan with the nurse or doctor.    ..........................................................................................................................................  Patient/Patient Representative Signature      ..........................................................................................................................................  Patient Representative Print Name and Relationship to Patient    ..................................................               ................................................  Date                                            Time    ..........................................................................................................................................  Reviewed by Signature/Title    ...................................................              ..............................................  Date                                                            Time

## 2017-02-01 NOTE — DISCHARGE INSTRUCTIONS
General Shoulder Arthroscopy Discharge Instructions    [phone]  [facility] Service Line for issues or concerns    Home prescriptions:  Oxycodone 5 mg tablets:  Take one or two tablets every 4 hrs as needed for pain.  Robaxin 750 mg tablets:  Take one tablet every eight hours as needed for pain from spasm.  Ibuprofen 600 mg tablet:  You may take one tablet every 8 hours if needed for pain.    Stool Softener:    You may use any stool softener you are familiar with. We have suggested over the counter Sennakot as a fall back.      General Care:  After surgery you may feel tired/sleepy. This is normal. Please have someone stay with you for 24 hours after surgery. You should avoid driving for 1-2 days after surgery, as your reaction time may be slow. You should not drive at all if you have had surgery on your arms, right leg and/or are taking narcotic pain medications until released by your doctor. If you have any question along the way please contact the office. If you feel it is an issue cannot wait for normal office hours, contact the on-call physician.    Bandages:    Change your bandage after the first 48 hours. You may use Band-Aids or sterile gauze with a small amount of tape. It s normal to have some blood-tinged fluid on your bandages, this will usually continue for the first day or two. Keep the area clean and dry. Do not apply any ointments.     Bathing:  Do not submerge your incision in water such as a bath or pool. It is ok to shower after removing your initial bandage after the first 2 days from surgery. Avoid any excessively hot showers, baths, or hot tubes after surgery.     Follow up:  Your follow up appointment should already be scheduled. If its not, please call the office to verify an appointment 10 days after surgery.     Diet:  Start with non-alcoholic liquids at first, particularly water or sports drinks after surgery. Progress to bland foods such as crackers and bread and finally to your  normal diet if you have no problems.     Pain control:    The shoulder block should give you good pain relief for the next 16- 20 hours.  You will also not have good control of your arm.      It is important to start taking your pain medications before the block wears off.  If you do not and the block does wear off you may have difficulty getting the pain under control.    We usually recommend you begin the medication before you go to sleep and take them as scheduled through the first night even if it means waking up to do so.    Take your pain medications as prescribed. These medications may make you sleepy. Do not drive, operate equipment, or drink alcohol when taking these.  You may take Tylenol (Generic name is acetaminophen) as directed on the bottle for additional relief or in place of the prescribed pain medications as your pain gets better. If the medications cause a reaction such as nausea or skin rash, stop taking them and contact your doctor. Please plan accordingly, pain medications will not be re-filled on the weekends or at night. Call the office during the day if you need more medications.      Narcotic pain medication can cause constipation.  We recommend using an over the counter stool softener such as Senna while using these medications.    Sleeping Position:  Sometimes this can be a challenge after shoulder surgery. Some find it comfortable sleeping propped up on several pillows with pillows also behind their elbow. Others, if available, sleep in an easy chair. You may also lay on your back, it may be more comfortable to have a pillow behind your elbow to keep it from falling back.     Sling/Brace:  Keep the sling or brace on after surgery until your follow up. You may remove to bathe and to do your Codmans exercises.  You should also slip your arm out of your sling and allow your elbow to straighten all the way out and then bend all the way up. You may need to use your other arm to assist in  this. Also make sure to move your wrist back and forth and practice making a fist. Do this 10-15 times about 3-4 times a day.      Physical Therapy:  Depending on your surgery, physical therapy may start within a few days or be delayed 4-6 weeks. At your first post-operative visit, your doctor will direct you on your personal therapy program.     Normal findings after surgery:  It is sometimes normal to have pain in the back of the shoulder even before the block wears off in the hand or elbow.  Warmth and swelling about the hand and shoulder is normal for up to 3-4 weeks after surgery.  Numbness around the incisions is normal.  You may have bruising around the incisions and into the bicep area. You may notice this bruising settle into the armpit, elbow and forearm.   Low grade fevers less than 100.5 degrees Fahrenheit are normal.     When to call the Office:  Temperature greater than 101.5 degrees Fahreheit.  Fever, chills, and increasing pain in the shoulder.  Excessive drainage from the incisions that include bright red blood.  Drainage from the incisions sites that appear yellow, pus-like, or foul smelling.  Increasing pain the shoulder not relieved by the prescribed pain medications or ice.  Persistent nausea or vomiting.  Any other effects you feel are significant.                        CODMAN S EXERCISE    GENTLE PASSIVE RANGE OF MOTION EXERCISE    Rock your body back and forth or in circles.    Let your arm hang at your side and swing like a pendulum.    Don t use arm muscles to move your arm, rather use the sway of your body.    Do this 3 times a day for 3 minutes.    *WHEN NOT EXERCISING KEEP ARM IN SLING!          [facility]   Adult Discharge Orders & Instructions     For 24 hours after surgery    1. Get plenty of rest.  A responsible adult must stay with you for at least 24 hours after you leave the hospital.   2. Do not drive or use heavy equipment.  If you have weakness or tingling,  don't drive or use heavy equipment until this feeling goes away.  3. Do not drink alcohol.  4. Avoid strenuous or risky activities.  Ask for help when climbing stairs.   5. You may feel lightheaded.  IF so, sit for a few minutes before standing.  Have someone help you get up.   6. If you have nausea (feel sick to your stomach): Drink only clear liquids such as apple juice, ginger ale, broth or 7-Up.  Rest may also help.  Be sure to drink enough fluids.  Move to a regular diet as you feel able.  7. You may have a slight fever. Call the doctor if your fever is over 100 F (37.7 C) (taken under the tongue) or lasts longer than 24 hours.  8. You may have a dry mouth, a sore throat, muscle aches or trouble sleeping.  These should go away after 24 hours.  9. Do not make important or legal decisions.   Call your doctor for any of the followin.  Signs of infection (fever, growing tenderness at the surgery site, a large amount of drainage or bleeding, severe pain, foul-smelling drainage, redness, swelling).    2. It has been over 8 to 10 hours since surgery and you are still not able to urinate (pass water).    3.  Headache for over 24 hours.        Interscalene Nerve Block   Regional anesthesia is injected into or around the nerves to anesthetize the area supplied by that set of nerves.  It is a type of local anesthesia used to numb a specific area, and is used to control pain and decrease the need for narcotic following surgery.  Interscalene Block: A block injected near the neck/upper shoulder for post-operative pain relief after upper arm or shoulder surgery.  Benefits: Significant to total pain relief following extensive surgeries involving the shoulder and upper arm.  Additional benefits include: Decreased pain medication requirements, reduced incidence of nausea and vomiting, and potentially early discharge home.  Normal Course: A numb and often immobile shoulder and upper arm is expected for approximately 8-12  hours.  The duration of the numbness can vary and is dependent on the type of local anesthetic used, additive and individual variation.    Once the numbness starts to wear off: the discomfort from surgery will intensify progressively over the next 1-2 hours.  We recommend starting oral narcotics (e.g. Vicodin or Percocet) and anti-inflammatory medications (e.g. ibuprofen or Motrin) as soon as oral medications are tolerated.  These medications should be taken on a scheduled basis, allowing for a smooth transition from the nerve block to oral medication based relief.  Normal and expected side effects: a droopy eyelid and blurry vision on the affected side, along with voice hoarseness can last as long as the local anesthetic effect.  Local anesthetic effect varies, but is typically between 8 and 24 hours.  Mild sensation of shortness of breath may be noted particularly in patients with respiratory disease  Risks: failed block, bleeding, infection, reaction to local anesthetic including seizure and cardiac arrest, spinal block, epidural jean, collapsed lung, peripheral nerve injury or persistent tingling sensation are all potential risks.  Please discuss any concerns regarding these risks with your anesthesia care provider.  These are most likely to occur at the time of administration of the block.   Additional Recommendations: Please keep the operative arm and elbow well protected and padded for the duration of numbness.  This will prevent unrecognized pressure from being placed on the arm that could result in nerve injury.  Post-operative call: To ensure your safety, you will receive a nursing call 24 hours after surgery.  If you have additional concerns or if you feel that the medications are not wearing off, please inform the nurse or your anesthesia care provider.  Please discuss all concerns regarding this procedure and your anesthetic care with your anesthesia care provider.  The above information is not intended  as a substitute for a complete discussion with your anesthesia care provider.  It is intended for your education and to enhance your ability to ask informed questions.

## 2017-02-01 NOTE — IP AVS SNAPSHOT
MRN:[id number]                      After Visit Summary   2/1/2017    Waqas Allen    MRN: [id number]           Thank you!     Thank you for choosing [facility] for your care. Our goal is always to provide you with excellent care. Hearing back from our patients is one way we can continue to improve our services. Please take a few minutes to complete the written survey that you may receive in the mail after you visit with us. Thank you!        Patient Information     Date Of Birth          1980        About your hospital stay     You were admitted on:  February 1, 2017 You last received care in the:  [facility] Phase II    You were discharged on:  February 1, 2017       Who to Call     For medical emergencies, please call 911.  For non-urgent questions about your medical care, please call your primary care provider or clinic, [phone]  For questions related to your surgery, please call your surgery clinic        Attending Provider     Provider    Watson Duckworth MD       Primary Care Provider Office Phone # Fax #    Michael Palumbo [phone]       [address] [address]        After Care Instructions      Diet as Tolerated       Return to diet before surgery, unless instructed otherwise.            Discharge Instructions       Review outpatient procedure discharge instructions as directed by MD            Ice to affected area       Ice pack to surgical site every 15 minutes per hour for 24 hours            Remove dressing - at 48 hours       Cover wounds only as needed.            Shower        Cover dressing if dressing is not going to be changed today            Wound care       Do not immerse wound in water until sutures removed                  Your next 10 appointments already scheduled     Feb 16, 2017  2:00 PM   Return Visit with Watson Duckworth MD   [facility]  [facility]    [address] [address] [phone]              Further instructions from your care team       General Shoulder Arthroscopy Discharge Instructions    [phone]  Bone and Joint Service Line for issues or concerns    Home prescriptions:  Oxycodone 5 mg tablets:  Take one or two tablets every 4 hrs as needed for pain.  Robaxin 750 mg tablets:  Take one tablet every eight hours as needed for pain from spasm.  Ibuprofen 600 mg tablet:  You may take one tablet every 8 hours if needed for pain.    Stool Softener:    You may use any stool softener you are familiar with. We have suggested over the counter Sennakot as a fall back.      General Care:  After surgery you may feel tired/sleepy. This is normal. Please have someone stay with you for 24 hours after surgery. You should avoid driving for 1-2 days after surgery, as your reaction time may be slow. You should not drive at all if you have had surgery on your arms, right leg and/or are taking narcotic pain medications until released by your doctor. If you have any question along the way please contact the office. If you feel it is an issue cannot wait for normal office hours, contact the on-call physician.    Bandages:    Change your bandage after the first 48 hours. You may use Band-Aids or sterile gauze with a small amount of tape. It s normal to have some blood-tinged fluid on your bandages, this will usually continue for the first day or two. Keep the area clean and dry. Do not apply any ointments.     Bathing:  Do not submerge your incision in water such as a bath or pool. It is ok to shower after removing your initial bandage after the first 2 days from surgery. Avoid any excessively hot showers, baths, or hot tubes after surgery.     Follow up:  Your follow up appointment should already be scheduled. If its not, please call the office to verify an appointment 10 days after surgery.     Diet:  Start with non-alcoholic liquids  at first, particularly water or sports drinks after surgery. Progress to bland foods such as crackers and bread and finally to your normal diet if you have no problems.     Pain control:    The shoulder block should give you good pain relief for the next 16- 20 hours.  You will also not have good control of your arm.      It is important to start taking your pain medications before the block wears off.  If you do not and the block does wear off you may have difficulty getting the pain under control.    We usually recommend you begin the medication before you go to sleep and take them as scheduled through the first night even if it means waking up to do so.    Take your pain medications as prescribed. These medications may make you sleepy. Do not drive, operate equipment, or drink alcohol when taking these.  You may take Tylenol (Generic name is acetaminophen) as directed on the bottle for additional relief or in place of the prescribed pain medications as your pain gets better. If the medications cause a reaction such as nausea or skin rash, stop taking them and contact your doctor. Please plan accordingly, pain medications will not be re-filled on the weekends or at night. Call the office during the day if you need more medications.      Narcotic pain medication can cause constipation.  We recommend using an over the counter stool softener such as Senna while using these medications.    Sleeping Position:  Sometimes this can be a challenge after shoulder surgery. Some find it comfortable sleeping propped up on several pillows with pillows also behind their elbow. Others, if available, sleep in an easy chair. You may also lay on your back, it may be more comfortable to have a pillow behind your elbow to keep it from falling back.     Sling/Brace:  Keep the sling or brace on after surgery until your follow up. You may remove to bathe and to do your Codmans exercises.  You should also slip your arm out of your sling  and allow your elbow to straighten all the way out and then bend all the way up. You may need to use your other arm to assist in this. Also make sure to move your wrist back and forth and practice making a fist. Do this 10-15 times about 3-4 times a day.      Physical Therapy:  Depending on your surgery, physical therapy may start within a few days or be delayed 4-6 weeks. At your first post-operative visit, your doctor will direct you on your personal therapy program.     Normal findings after surgery:  It is sometimes normal to have pain in the back of the shoulder even before the block wears off in the hand or elbow.  Warmth and swelling about the hand and shoulder is normal for up to 3-4 weeks after surgery.  Numbness around the incisions is normal.  You may have bruising around the incisions and into the bicep area. You may notice this bruising settle into the armpit, elbow and forearm.   Low grade fevers less than 100.5 degrees Fahrenheit are normal.     When to call the Office:  Temperature greater than 101.5 degrees Fahreheit.  Fever, chills, and increasing pain in the shoulder.  Excessive drainage from the incisions that include bright red blood.  Drainage from the incisions sites that appear yellow, pus-like, or foul smelling.  Increasing pain the shoulder not relieved by the prescribed pain medications or ice.  Persistent nausea or vomiting.  Any other effects you feel are significant.                        CODMAN S EXERCISE    GENTLE PASSIVE RANGE OF MOTION EXERCISE    Rock your body back and forth or in circles.    Let your arm hang at your side and swing like a pendulum.    Don t use arm muscles to move your arm, rather use the sway of your body.    Do this 3 times a day for 3 minutes.    *WHEN NOT EXERCISING KEEP ARM IN SLING!          [facility]   Adult Discharge Orders & Instructions     For 24 hours after surgery    1. Get plenty of rest.  A responsible adult must stay with you for  at least 24 hours after you leave the hospital.   2. Do not drive or use heavy equipment.  If you have weakness or tingling, don't drive or use heavy equipment until this feeling goes away.  3. Do not drink alcohol.  4. Avoid strenuous or risky activities.  Ask for help when climbing stairs.   5. You may feel lightheaded.  IF so, sit for a few minutes before standing.  Have someone help you get up.   6. If you have nausea (feel sick to your stomach): Drink only clear liquids such as apple juice, ginger ale, broth or 7-Up.  Rest may also help.  Be sure to drink enough fluids.  Move to a regular diet as you feel able.  7. You may have a slight fever. Call the doctor if your fever is over 100 F (37.7 C) (taken under the tongue) or lasts longer than 24 hours.  8. You may have a dry mouth, a sore throat, muscle aches or trouble sleeping.  These should go away after 24 hours.  9. Do not make important or legal decisions.   Call your doctor for any of the followin.  Signs of infection (fever, growing tenderness at the surgery site, a large amount of drainage or bleeding, severe pain, foul-smelling drainage, redness, swelling).    2. It has been over 8 to 10 hours since surgery and you are still not able to urinate (pass water).    3.  Headache for over 24 hours.        Interscalene Nerve Block   Regional anesthesia is injected into or around the nerves to anesthetize the area supplied by that set of nerves.  It is a type of local anesthesia used to numb a specific area, and is used to control pain and decrease the need for narcotic following surgery.  Interscalene Block: A block injected near the neck/upper shoulder for post-operative pain relief after upper arm or shoulder surgery.  Benefits: Significant to total pain relief following extensive surgeries involving the shoulder and upper arm.  Additional benefits include: Decreased pain medication requirements, reduced incidence of nausea and vomiting, and  potentially early discharge home.  Normal Course: A numb and often immobile shoulder and upper arm is expected for approximately 8-12 hours.  The duration of the numbness can vary and is dependent on the type of local anesthetic used, additive and individual variation.    Once the numbness starts to wear off: the discomfort from surgery will intensify progressively over the next 1-2 hours.  We recommend starting oral narcotics (e.g. Vicodin or Percocet) and anti-inflammatory medications (e.g. ibuprofen or Motrin) as soon as oral medications are tolerated.  These medications should be taken on a scheduled basis, allowing for a smooth transition from the nerve block to oral medication based relief.  Normal and expected side effects: a droopy eyelid and blurry vision on the affected side, along with voice hoarseness can last as long as the local anesthetic effect.  Local anesthetic effect varies, but is typically between 8 and 24 hours.  Mild sensation of shortness of breath may be noted particularly in patients with respiratory disease  Risks: failed block, bleeding, infection, reaction to local anesthetic including seizure and cardiac arrest, spinal block, epidural jean, collapsed lung, peripheral nerve injury or persistent tingling sensation are all potential risks.  Please discuss any concerns regarding these risks with your anesthesia care provider.  These are most likely to occur at the time of administration of the block.   Additional Recommendations: Please keep the operative arm and elbow well protected and padded for the duration of numbness.  This will prevent unrecognized pressure from being placed on the arm that could result in nerve injury.  Post-operative call: To ensure your safety, you will receive a nursing call 24 hours after surgery.  If you have additional concerns or if you feel that the medications are not wearing off, please inform the nurse or your anesthesia care provider.  Please discuss all  concerns regarding this procedure and your anesthetic care with your anesthesia care provider.  The above information is not intended as a substitute for a complete discussion with your anesthesia care provider.  It is intended for your education and to enhance your ability to ask informed questions.    Pending Results     No orders found from 1/31/2017 to 2/2/2017.            Admission Information        Provider Department Dept Phone    2/1/2017 Watson Duckworth MD  Preop/Phase [phone]      Your Vitals Were     Blood Pressure Temperature Respirations Weight Pulse Oximetry       150/91 mmHg 98.1  F (36.7  C) (Oral) 23 141.568 kg (312 lb 1.6 oz) 98%       MyChart Information     [employer] gives you secure access to your electronic health record. If you see a primary care provider, you can also send messages to your care team and make appointments. If you have questions, please call your primary care clinic.  If you do not have a primary care provider, please call [phone] and they will assist you.        Care EveryWhere ID     This is your Care EveryWhere ID. This could be used by other organizations to access your [facility] medical records  [id number]           Review of your medicines      START taking        Dose / Directions    ibuprofen 600 MG tablet   Commonly known as:  ADVIL/MOTRIN   Used for:  AC joint arthropathy, Post-op pain        Dose:  600 mg   Take 1 tablet (600 mg) by mouth every 6 hours as needed for pain (mild)   Quantity:  30 tablet   Refills:  0       methocarbamol 750 MG tablet   Commonly known as:  ROBAXIN   Used for:  AC joint arthropathy, Post-op pain        Dose:  750 mg   Take 1 tablet (750 mg) by mouth every 6 hours as needed for muscle spasms (muscle spasm)   Quantity:  60 tablet   Refills:  0       oxyCODONE 5 MG IR tablet   Commonly known as:  ROXICODONE   Used for:  AC joint arthropathy, Post-op pain        Dose:  5-10 mg   Take 1-2 tablets (5-10 mg) by mouth every 3  hours as needed for pain or other (Moderate to Severe)   Quantity:  70 tablet   Refills:  0       senna-docusate 8.6-50 MG per tablet   Commonly known as:  SENOKOT-S;PERICOLACE   Used for:  AC joint arthropathy, Post-op pain        Dose:  1-2 tablet   Take 1-2 tablets by mouth 2 times daily Take while on oral narcotics to prevent or treat constipation.   Quantity:  30 tablet   Refills:  0         CONTINUE these medicines which have NOT CHANGED        Dose / Directions    ascorbic acid 500 MG tablet   Commonly known as:  VITAMIN C        Dose:  500 mg   Take 500 mg by mouth 2 times daily   Refills:  0       aspirin 81 MG tablet   Used for:  Essential hypertension, benign        ONE DAILY   Quantity:  100   Refills:  3       blood glucose monitoring lancets   Used for:  Type 2 diabetes mellitus (H)        Dose:  1 each   1 each 2 times daily   Quantity:  3 Box   Refills:  2       blood glucose monitoring test strip   Commonly known as:  ACCU-CHEK SMARTVIEW   Used for:  Type 2 diabetes, HbA1c goal < 7% (H)        Dose:  1 strip   1 strip by In Vitro route 2 times daily   Quantity:  100 each   Refills:  11       glipiZIDE 5 MG tablet   Commonly known as:  GLUCOTROL   Used for:  Type 2 diabetes mellitus without complication, without long-term current use of insulin (H)        Dose:  10 mg   Take 2 tablets (10 mg) by mouth 2 times daily (before meals)   Quantity:  30 tablet   Refills:  5       losartan 50 MG tablet   Commonly known as:  COZAAR   Used for:  Hypertension goal BP (blood pressure) < 140/90        TAKE 1 TABLET BY MOUTH EVER Y DAY   Quantity:  90 tablet   Refills:  0       minocycline 100 MG capsule   Commonly known as:  MINOCIN/DYNACIN   Used for:  Acne vulgaris        TAKE 1 CAPSULE BY MOUTH TWI CE A DAY   Quantity:  60 capsule   Refills:  3       pioglitazone 15 MG tablet   Commonly known as:  ACTOS   Used for:  Type 2 diabetes mellitus without complication, without long-term current use of insulin (H)         Dose:  15 mg   Take 1 tablet (15 mg) by mouth daily   Quantity:  30 tablet   Refills:  5       POTASSIUM GLUCONATE PO        Dose:  1 tablet   Take 1 tablet by mouth daily   Refills:  0       ranitidine 150 MG tablet   Commonly known as:  ZANTAC        Dose:  150 mg   Take 150 mg by mouth daily   Refills:  0       zinc 50 MG Tabs        Dose:  1 tablet   Take 1 tablet by mouth daily   Refills:  0            Where to get your medicines      Some of these will need a paper prescription and others can be bought over the counter. Ask your nurse if you have questions.     Bring a paper prescription for each of these medications    - ibuprofen 600 MG tablet  - methocarbamol 750 MG tablet  - oxyCODONE 5 MG IR tablet  - senna-docusate 8.6-50 MG per tablet             Protect others around you: Learn how to safely use, store and throw away your medicines at www.disposemymeds.org.             Medication List: This is a list of all your medications and when to take them. Check marks below indicate your daily home schedule. Keep this list as a reference.      Medications           Morning Afternoon Evening Bedtime As Needed    ascorbic acid 500 MG tablet   Commonly known as:  VITAMIN C   Take 500 mg by mouth 2 times daily                                aspirin 81 MG tablet   ONE DAILY                                blood glucose monitoring lancets   1 each 2 times daily                                blood glucose monitoring test strip   Commonly known as:  ACCU-CHEK SMARTVIEW   1 strip by In Vitro route 2 times daily                                glipiZIDE 5 MG tablet   Commonly known as:  GLUCOTROL   Take 2 tablets (10 mg) by mouth 2 times daily (before meals)                                ibuprofen 600 MG tablet   Commonly known as:  ADVIL/MOTRIN   Take 1 tablet (600 mg) by mouth every 6 hours as needed for pain (mild)                                losartan 50 MG tablet   Commonly known as:  COZAAR   TAKE 1 TABLET BY  MOUTH EVER Y DAY                                methocarbamol 750 MG tablet   Commonly known as:  ROBAXIN   Take 1 tablet (750 mg) by mouth every 6 hours as needed for muscle spasms (muscle spasm)                                minocycline 100 MG capsule   Commonly known as:  MINOCIN/DYNACIN   TAKE 1 CAPSULE BY MOUTH TWI CE A DAY                                oxyCODONE 5 MG IR tablet   Commonly known as:  ROXICODONE   Take 1-2 tablets (5-10 mg) by mouth every 3 hours as needed for pain or other (Moderate to Severe)                                pioglitazone 15 MG tablet   Commonly known as:  ACTOS   Take 1 tablet (15 mg) by mouth daily                                POTASSIUM GLUCONATE PO   Take 1 tablet by mouth daily                                ranitidine 150 MG tablet   Commonly known as:  ZANTAC   Take 150 mg by mouth daily                                senna-docusate 8.6-50 MG per tablet   Commonly known as:  SENOKOT-S;PERICOLACE   Take 1-2 tablets by mouth 2 times daily Take while on oral narcotics to prevent or treat constipation.                                zinc 50 MG Tabs   Take 1 tablet by mouth daily

## 2017-02-02 NOTE — OP NOTE
DATE OF SURGERY:  2/1/2017.       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      POSTOPERATIVE DIAGNOSIS:  Same.      PROCEDURE:  Arthroscopic evaluation of the glenohumeral joint with arthroscopic subacromial decompression and distal clavicle excision.      SURGEON:  Watson Duckworth MD.      ANESTHESIA:  General with postoperative shoulder block.      ASSISTANT:  Tali Mauricio PA-C; needed for shoulder positioning and equipment management.      INDICATIONS:  Mr. Waqas Allen is a 36-year-old gentleman who underwent a similar procedure on the opposite shoulder last year.  Given the clinical findings on the right shoulder and persistent symptoms, surgical decompression and distal clavicle excision was requested.  The rationale, risks and benefits were understood by the patient and his wife.  There were seen in the preoperative hold area.  The extremity was marked and consent was obtained.      PROCEDURE:  The patient was brought to the operating room placed on the operating table and general anesthesia induced.  We then placed him into the sitting posture using the T-Max positioner.  Here he was prepped and draped in standard fashion for shoulder surgery.  The [employer] arm positioning device was utilized.        Timeout protocol was followed.      The shoulder and subacromial space was filled with normal saline.  A standard posterior approach was identified and created without difficulty.  We rapidly established an anterior superior arthroscopic portal for work.  We systematically evaluated the glenohumeral joint where all articular surfaces appeared to be healthy.  The labral region was studied in a 360-degree fashion and there was no evidence of detachment.  The biceps was attached appropriately, coursed appropriately through the bicipital groove and did not show any instability or unusual tendinosis.  Rotator cuff attachment was followed from subscapularis through  the posterior attachment and again there was no evidence of undersurface cuff damage.      We then switched to the subacromial space.  We did establish anterior portal utilizing the previous skin incision.  We used a motorized shaver to develop a subacromial space, then rapidly develop a lateral arthroscopic portal.  Electrocautery ablator was then introduced.  The subacromial space was cleared of bursal tissue for visualization.  The undersurface spurring of the acromioclavicular joint was recognized at this point.  There was some damage to the superior surface in the cuff but this was relatively minimal.      We then introduced the arthroscopic bur from the lateral portal and performed an anterolateral subacromial decompression.  We then shifted the arthroscope with a bur to the anterior portal and then removed the distal portion of the clavicle.  The arthroscope was repositioned to the lateral portal for better visualization.  We made certain that the undersurface of the clavicle had been beveled and that we had resected it in all planes to an even amount removing at least a cm of bone.  The ligaments of the acromioclavicular joint appeared to be intact superiorly and posteriorly.      With the above accomplished, the instruments were removed.  Wounds were closed with figure-of-eight 3-0 nylon.  He was dressed with Xeroform, 4x4, ABD and tape.      He was placed in a sling.  He was repositioned on the table, awakened and transferred to the [facility].  He was then taken to the recovery room where stable vital signs were noted.  A shoulder block was accomplished in the recovery room.         АЛЕКСАНДР WHITTEN MD             D: 2017 17:21   T: 2017 21:16   MT: [id number]      Name:     HARLEEN FELICIANO   MRN:      [id number]        Account:        [id number]   :      1980           Procedure Date: 2017      Document: [id number]

## 2017-02-10 NOTE — ADDENDUM NOTE
Addendum  created 02/10/17 1314 by Eladio Daigle APRN CRNA    Modules edited: Anesthesia Blocks and Procedures, Clinical Notes    Clinical Notes:  File: [id number]

## 2017-02-14 DIAGNOSIS — L70.0 ACNE VULGARIS: ICD-10-CM

## 2017-02-15 RX ORDER — MINOCYCLINE HYDROCHLORIDE 100 MG/1
CAPSULE ORAL
Qty: 60 CAPSULE | Refills: 3 | Status: SHIPPED | OUTPATIENT
Start: 2017-02-15 | End: 2017-06-14

## 2017-02-15 NOTE — TELEPHONE ENCOUNTER
Routing refill request to provider for review/approval because:  Drug interaction warning    Mary Marroquin, RN  [facility]

## 2017-02-16 ENCOUNTER — OFFICE VISIT (OUTPATIENT)
Dept: ORTHOPEDICS | Facility: CLINIC | Age: 37
End: 2017-02-16
Payer: COMMERCIAL

## 2017-02-16 VITALS — WEIGHT: 311 LBS | HEIGHT: 64 IN | TEMPERATURE: 98.1 F | BODY MASS INDEX: 53.1 KG/M2

## 2017-02-16 DIAGNOSIS — M19.019 AC JOINT ARTHROPATHY: ICD-10-CM

## 2017-02-16 DIAGNOSIS — G89.18 POST-OP PAIN: ICD-10-CM

## 2017-02-16 PROCEDURE — 99024 POSTOP FOLLOW-UP VISIT: CPT | Performed by: PHYSICIAN ASSISTANT

## 2017-02-16 RX ORDER — OXYCODONE HYDROCHLORIDE 5 MG/1
5-10 TABLET ORAL
Qty: 70 TABLET | Refills: 0 | Status: SHIPPED | OUTPATIENT
Start: 2017-02-16 | End: 2017-05-22

## 2017-02-16 RX ORDER — GABAPENTIN 300 MG/1
300 CAPSULE ORAL AT BEDTIME
Qty: 30 CAPSULE | Refills: 1 | Status: SHIPPED | OUTPATIENT
Start: 2017-02-16 | End: 2017-04-18

## 2017-02-16 RX ORDER — METHOCARBAMOL 750 MG/1
750 TABLET, FILM COATED ORAL EVERY 6 HOURS PRN
Qty: 60 TABLET | Refills: 0 | Status: SHIPPED | OUTPATIENT
Start: 2017-02-16 | End: 2017-05-22

## 2017-02-16 ASSESSMENT — PAIN SCALES - GENERAL: PAINLEVEL: MODERATE PAIN (4)

## 2017-02-16 NOTE — MR AVS SNAPSHOT
"              After Visit Summary   2/16/2017    Waqas Allen    MRN: [id number]           Patient Information     Date Of Birth          1980        Visit Information        Provider Department      2/16/2017 2:00 PM Watson Duckworth MD [facility]        Today's Diagnoses     AC joint arthropathy        Post-op pain           Follow-ups after your visit        Who to contact     If you have questions or need follow up information about today's clinic visit or your schedule please contact [facility] directly at [phone].  Normal or non-critical lab and imaging results will be communicated to you by [employer], letter or phone within 4 business days after the clinic has received the results. If you do not hear from us within 7 days, please contact the clinic through [employer] or phone. If you have a critical or abnormal lab result, we will notify you by phone as soon as possible.  Submit refill requests through [employer] or call your pharmacy and they will forward the refill request to us. Please allow 3 business days for your refill to be completed.          Additional Information About Your Visit        MyChart Information     [employer] gives you secure access to your electronic health record. If you see a primary care provider, you can also send messages to your care team and make appointments. If you have questions, please call your primary care clinic.  If you do not have a primary care provider, please call [phone] and they will assist you.        Care EveryWhere ID     This is your Care EveryWhere ID. This could be used by other organizations to access your [place] medical records  [id number]        Your Vitals Were     Temperature Height BMI (Body Mass Index)             98.1  F (36.7  C) (Temporal) 1.626 m (5' 4\") 53.38 kg/m2          Blood Pressure from Last 3 Encounters:   02/01/17 144/78   01/18/17 128/78   01/09/17 128/82    Weight from Last 3 Encounters: "   02/16/17 (!) 141.1 kg (311 lb)   02/01/17 (!) 141.6 kg (312 lb 1.6 oz)   01/18/17 (!) 141.6 kg (312 lb 1.6 oz)              Today, you had the following     No orders found for display         Where to get your medicines      These medications were sent to [id number] - [place], [place] - [address] [place] [address]  [address] [place] [id number]    Hours:  M-F 8:30-6:30; Sat 9-4; closed Sunday Phone:  [phone]     gabapentin 300 MG capsule    methocarbamol 750 MG tablet         Some of these will need a paper prescription and others can be bought over the counter.  Ask your nurse if you have questions.     Bring a paper prescription for each of these medications     oxyCODONE 5 MG IR tablet          Primary Care Provider Office Phone # Fax #    Michael Palumbo [phone]       [facility] [address] [facility] [address]        Thank you!     Thank you for choosing [facility]  for your care. Our goal is always to provide you with excellent care. Hearing back from our patients is one way we can continue to improve our services. Please take a few minutes to complete the written survey that you may receive in the mail after your visit with us. Thank you!             Your Updated Medication List - Protect others around you: Learn how to safely use, store and throw away your medicines at www.disposemymeds.org.          This list is accurate as of: 2/16/17 11:59 PM.  Always use your most recent med list.                   Brand Name Dispense Instructions for use    ascorbic acid 500 MG tablet    VITAMIN C     Take 500 mg by mouth 2 times daily       aspirin 81 MG tablet     100    ONE DAILY       blood glucose monitoring lancets     3 Box    1 each 2 times daily       blood glucose monitoring test strip    ACCU-CHEK SMARTVIEW    100 each    1 strip by In Vitro route 2 times daily       gabapentin 300 MG capsule    NEURONTIN    30 capsule    Take 1 capsule (300  mg) by mouth At Bedtime       glipiZIDE 5 MG tablet    GLUCOTROL    30 tablet    Take 2 tablets (10 mg) by mouth 2 times daily (before meals)       ibuprofen 600 MG tablet    ADVIL/MOTRIN    30 tablet    Take 1 tablet (600 mg) by mouth every 6 hours as needed for pain (mild)       methocarbamol 750 MG tablet    ROBAXIN    60 tablet    Take 1 tablet (750 mg) by mouth every 6 hours as needed for muscle spasms (muscle spasm)       minocycline 100 MG capsule    MINOCIN/DYNACIN    60 capsule    TAKE 1 CAPSULE BY MOUTH TWI CE A DAY       oxyCODONE 5 MG IR tablet    ROXICODONE    70 tablet    Take 1-2 tablets (5-10 mg) by mouth every 3 hours as needed for pain or other (Moderate to Severe)       pioglitazone 15 MG tablet    ACTOS    30 tablet    Take 1 tablet (15 mg) by mouth daily       POTASSIUM GLUCONATE PO      Take 1 tablet by mouth daily       ranitidine 150 MG tablet    ZANTAC     Take 150 mg by mouth daily       senna-docusate 8.6-50 MG per tablet    SENOKOT-S;PERICOLACE    30 tablet    Take 1-2 tablets by mouth 2 times daily Take while on oral narcotics to prevent or treat constipation.       zinc 50 MG Tabs      Take 1 tablet by mouth daily

## 2017-02-16 NOTE — NURSING NOTE
"Chief Complaint   Patient presents with     RECHECK     Right shoulder arthroscopy distal clavicle excision and subacromial decompression.  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 POSTOPERATIVE DIAGNOSIS: Same. PROCEDURE: Arthroscopic evaluation of the glenohumeral joint with arthroscopic subacromial decompression and distal clavicle excision.       Initial Temp 98.1  F (36.7  C) (Temporal)  Ht 1.626 m (5' 4\")  Wt (!) 141.1 kg (311 lb)  BMI 53.38 kg/m2 Estimated body mass index is 53.38 kg/(m^2) as calculated from the following:    Height as of this encounter: 1.626 m (5' 4\").    Weight as of this encounter: 141.1 kg (311 lb).  BP completed using cuff size: NA (Not Taken)  Medication Reconciliation: complete    Lesley Kwan CMA, February 16, 2017  "

## 2017-02-16 NOTE — PROGRESS NOTES
"HISTORY OF PRESENT ILLNESS:    Waqas Allen is a 36 year old male who is seen in follow up for   Chief Complaint   Patient presents with     RECHECK     Right shoulder arthroscopy distal clavicle excision and subacromial decompression.  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 POSTOPERATIVE DIAGNOSIS: Same. PROCEDURE: Arthroscopic evaluation of the glenohumeral joint with arthroscopic subacromial decompression and distal clavicle excision.   Interval: Patient reports his right arm is doing very well. His latest concoction of pain medications has done very well for him. He is currently on gabapentin, narcotic pain medication which he uses only as needed, and Robaxin. Patient has discontinued the use of the sling as it is more irritating to him than not wearing it. Patient does frequently support his arm at home up on pillows or armrests.   Family present: Wife    Physical Exam:  Vitals: Temp 98.1  F (36.7  C) (Temporal)  Ht 1.626 m (5' 4\")  Wt (!) 141.1 kg (311 lb)  BMI 53.38 kg/m2  BMI= Body mass index is 53.38 kg/(m^2).  Constitutional: healthy, alert and no acute distress   Psychiatric: mentation appears normal and affect normal/bright  NEURO: no focal deficits  Location of surgery:  Right shoulder  Incision sites:  Sutures removed today  Range of motion:  Patient is a 30 able to raise his arm in forward flexion to approximately 90 . Patient performs pendulum exercises readily.     ASSESSMENT:    Chief Complaint   Patient presents with     RECHECK     Right shoulder arthroscopy distal clavicle excision and subacromial decompression.  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 POSTOPERATIVE DIAGNOSIS: Same. PROCEDURE: Arthroscopic evaluation of the glenohumeral joint with arthroscopic subacromial " decompression and distal clavicle excision.       ICD-10-CM    1. AC joint arthropathy M25.9 oxyCODONE (ROXICODONE) 5 MG IR tablet     gabapentin (NEURONTIN) 300 MG capsule     methocarbamol (ROBAXIN) 750 MG tablet   2. Post-op pain G89.18 oxyCODONE (ROXICODONE) 5 MG IR tablet     gabapentin (NEURONTIN) 300 MG capsule     methocarbamol (ROBAXIN) 750 MG tablet     Patient is 15 days postop Right shoulder arthroscopy with subacromial decompression and distal clavicle excision.  Patient is doing well with early range of motion    Plan:   Sutures removed this visit.  Refill pain medication: Oxycodone, gabapentin and Robaxin was also reordered  Physical Therapy: Patient will do his own home exercises in regards to  Increasing range of motion.  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  Return to clinic 4 weeks to assure the patient's range of motion has returned.    Tali Mauricio PA-C   2/16/2017  3:46 PM

## 2017-02-16 NOTE — LETTER
"  2/16/2017       RE: Waqas Allen  [address] [facility] [address]           Dear Colleague,    Thank you for referring your patient, Waqas Allen, to the [facility]. Please see a copy of my visit note below.    HISTORY OF PRESENT ILLNESS:    Waqas Allen is a 36 year old male who is seen in follow up for   Chief Complaint   Patient presents with     RECHECK     Right shoulder arthroscopy distal clavicle excision and subacromial decompression.  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 POSTOPERATIVE DIAGNOSIS: Same. PROCEDURE: Arthroscopic evaluation of the glenohumeral joint with arthroscopic subacromial decompression and distal clavicle excision.   Interval: Patient reports his right arm is doing very well. His latest concoction of pain medications has done very well for him. He is currently on gabapentin, narcotic pain medication which he uses only as needed, and Robaxin. Patient has discontinued the use of the sling as it is more irritating to him than not wearing it. Patient does frequently support his arm at home up on pillows or armrests.   Family present: Wife    Physical Exam:  Vitals: Temp 98.1  F (36.7  C) (Temporal)  Ht 1.626 m (5' 4\")  Wt (!) 141.1 kg (311 lb)  BMI 53.38 kg/m2  BMI= Body mass index is 53.38 kg/(m^2).  Constitutional: healthy, alert and no acute distress   Psychiatric: mentation appears normal and affect normal/bright  NEURO: no focal deficits  Location of surgery:  Right shoulder  Incision sites:  Sutures removed today  Range of motion:  Patient is a 30 able to raise his arm in forward flexion to approximately 90 . Patient performs pendulum exercises readily.     ASSESSMENT:    Chief Complaint   Patient presents with     RECHECK     Right shoulder arthroscopy distal clavicle excision and subacromial decompression.  DOS: 2/1/17 (15 days postop)     " Surgical Followup     PREOPERATIVE DIAGNOSIS: Right shoulder impingement with acromioclavicular joint arthrosis. POSTOPERATIVE DIAGNOSIS: Same. PROCEDURE: Arthroscopic evaluation of the glenohumeral joint with arthroscopic subacromial decompression and distal clavicle excision.       ICD-10-CM    1. AC joint arthropathy M25.9 oxyCODONE (ROXICODONE) 5 MG IR tablet     gabapentin (NEURONTIN) 300 MG capsule     methocarbamol (ROBAXIN) 750 MG tablet   2. Post-op pain G89.18 oxyCODONE (ROXICODONE) 5 MG IR tablet     gabapentin (NEURONTIN) 300 MG capsule     methocarbamol (ROBAXIN) 750 MG tablet     Patient is 15 days postop Right shoulder arthroscopy with subacromial decompression and distal clavicle excision.  Patient is doing well with early range of motion    Plan:   Sutures removed this visit.  Refill pain medication: Oxycodone, gabapentin and Robaxin was also reordered  Physical Therapy: Patient will do his own home exercises in regards to  Increasing range of motion.  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  Return to clinic 4 weeks to assure the patient's range of motion has returned.    Tali Mauricio PA-C   2/16/2017  3:46 PM        Again, thank you for allowing me to participate in the care of your patient.        Sincerely,              Watson Duckworth MD

## 2017-03-01 DIAGNOSIS — E11.9 TYPE 2 DIABETES MELLITUS WITHOUT COMPLICATION, WITHOUT LONG-TERM CURRENT USE OF INSULIN (H): ICD-10-CM

## 2017-03-02 RX ORDER — GLIPIZIDE 5 MG/1
TABLET ORAL
Qty: 60 TABLET | Refills: 0 | Status: SHIPPED | OUTPATIENT
Start: 2017-03-02 | End: 2017-04-18

## 2017-03-02 NOTE — TELEPHONE ENCOUNTER
Routing refill request to provider for review/approval because:  Medication is reported/historical    Mary Marroquin, RN  [facility]

## 2017-03-02 NOTE — TELEPHONE ENCOUNTER
Glipizide         Last Written Prescription Date: NA  Last Fill Quantity: NA, # refills: NA  Last Office Visit with [facility], [facility] or [facility] prescribing provider:  1/18/17        BP Readings from Last 3 Encounters:   02/01/17 144/78   01/18/17 128/78   01/09/17 128/82     Lab Results   Component Value Date    MICROL 207 01/09/2017     No results found for: MICROALBUMIN  Creatinine   Date Value Ref Range Status   01/09/2017 0.93 0.66 - 1.25 mg/dL Final   ]  GFR Estimate   Date Value Ref Range Status   01/09/2017 >90  Non  GFR Calc   >60 mL/min/1.7m2 Final   01/11/2016 >90  Non  GFR Calc   >60 mL/min/1.7m2 Final   02/16/2015 >90  Non  GFR Calc   >60 mL/min/1.7m2 Final     GFR Estimate If Black   Date Value Ref Range Status   01/09/2017 >90   GFR Calc   >60 mL/min/1.7m2 Final   01/11/2016 >90   GFR Calc   >60 mL/min/1.7m2 Final   02/16/2015 >90   GFR Calc   >60 mL/min/1.7m2 Final     No results found for: CHOL  No results found for: HDL  Lab Results   Component Value Date    LDL 58 01/09/2017     No results found for: TRIG  No results found for: CHOLHDLRATIO  Lab Results   Component Value Date    AST 14 01/09/2017     Lab Results   Component Value Date    ALT 25 01/09/2017     Lab Results   Component Value Date    A1C 8.3 01/09/2017    A1C 11.1 12/15/2015    A1C 8.0 07/03/2015    A1C 7.6 02/16/2015    A1C 7.9 01/24/2014     Potassium   Date Value Ref Range Status   01/09/2017 4.0 3.4 - 5.3 mmol/L Final

## 2017-04-15 DIAGNOSIS — E11.9 TYPE 2 DIABETES MELLITUS WITHOUT COMPLICATION, WITHOUT LONG-TERM CURRENT USE OF INSULIN (H): ICD-10-CM

## 2017-04-17 NOTE — TELEPHONE ENCOUNTER
Actos         Last Written Prescription Date: 10/18/16  Last Fill Quantity: 30, # refills: 5  Last Office Visit with [facility], [facility] or [facility] prescribing provider:  1/18/17        BP Readings from Last 3 Encounters:   02/01/17 144/78   01/18/17 128/78   01/09/17 128/82     Lab Results   Component Value Date    MICROL 207 01/09/2017     Lab Results   Component Value Date    UMALCR 70.41 01/09/2017     Creatinine   Date Value Ref Range Status   01/09/2017 0.93 0.66 - 1.25 mg/dL Final   ]  GFR Estimate   Date Value Ref Range Status   01/09/2017 >90  Non  GFR Calc   >60 mL/min/1.7m2 Final   01/11/2016 >90  Non  GFR Calc   >60 mL/min/1.7m2 Final   02/16/2015 >90  Non  GFR Calc   >60 mL/min/1.7m2 Final     GFR Estimate If Black   Date Value Ref Range Status   01/09/2017 >90   GFR Calc   >60 mL/min/1.7m2 Final   01/11/2016 >90   GFR Calc   >60 mL/min/1.7m2 Final   02/16/2015 >90   GFR Calc   >60 mL/min/1.7m2 Final     No results found for: CHOL  No results found for: HDL  Lab Results   Component Value Date    LDL 58 01/09/2017     No results found for: TRIG  No results found for: CHOLHDLRATIO  Lab Results   Component Value Date    AST 14 01/09/2017     Lab Results   Component Value Date    ALT 25 01/09/2017     Lab Results   Component Value Date    A1C 8.3 01/09/2017    A1C 11.1 12/15/2015    A1C 8.0 07/03/2015    A1C 7.6 02/16/2015    A1C 7.9 01/24/2014     Potassium   Date Value Ref Range Status   01/09/2017 4.0 3.4 - 5.3 mmol/L Final

## 2017-04-17 NOTE — TELEPHONE ENCOUNTER
Routing refill request to provider for review/approval because:  Labs out of range:  Microalbumin and BP    Mary Marroquin RN  [facility]

## 2017-04-18 DIAGNOSIS — G89.18 POST-OP PAIN: ICD-10-CM

## 2017-04-18 DIAGNOSIS — M19.019 AC JOINT ARTHROPATHY: ICD-10-CM

## 2017-04-18 DIAGNOSIS — E11.9 TYPE 2 DIABETES MELLITUS WITHOUT COMPLICATION, WITHOUT LONG-TERM CURRENT USE OF INSULIN (H): ICD-10-CM

## 2017-04-18 RX ORDER — GABAPENTIN 300 MG/1
300 CAPSULE ORAL AT BEDTIME
Qty: 30 CAPSULE | Refills: 1 | Status: SHIPPED | OUTPATIENT
Start: 2017-04-18 | End: 2017-05-22

## 2017-04-18 RX ORDER — PIOGLITAZONEHYDROCHLORIDE 15 MG/1
TABLET ORAL
Qty: 30 TABLET | Refills: 3 | Status: SHIPPED | OUTPATIENT
Start: 2017-04-18 | End: 2017-08-18

## 2017-04-18 RX ORDER — GLIPIZIDE 5 MG/1
TABLET ORAL
Qty: 10 TABLET | Refills: 0 | Status: SHIPPED | OUTPATIENT
Start: 2017-04-18 | End: 2017-05-22

## 2017-04-18 NOTE — TELEPHONE ENCOUNTER
Glipizide         Last Written Prescription Date: 3-2-2017  Last Fill Quantity: 60, # refills: 0  Last Office Visit with [facility], [facility] or [facility] prescribing provider:  1-        BP Readings from Last 3 Encounters:   02/01/17 144/78   01/18/17 128/78   01/09/17 128/82     Lab Results   Component Value Date    MICROL 207 01/09/2017     Lab Results   Component Value Date    UMALCR 70.41 01/09/2017     Creatinine   Date Value Ref Range Status   01/09/2017 0.93 0.66 - 1.25 mg/dL Final   ]  GFR Estimate   Date Value Ref Range Status   01/09/2017 >90  Non  GFR Calc   >60 mL/min/1.7m2 Final   01/11/2016 >90  Non  GFR Calc   >60 mL/min/1.7m2 Final   02/16/2015 >90  Non  GFR Calc   >60 mL/min/1.7m2 Final     GFR Estimate If Black   Date Value Ref Range Status   01/09/2017 >90   GFR Calc   >60 mL/min/1.7m2 Final   01/11/2016 >90   GFR Calc   >60 mL/min/1.7m2 Final   02/16/2015 >90   GFR Calc   >60 mL/min/1.7m2 Final     No results found for: CHOL  No results found for: HDL  Lab Results   Component Value Date    LDL 58 01/09/2017     No results found for: TRIG  No results found for: CHOLHDLRATIO  Lab Results   Component Value Date    AST 14 01/09/2017     Lab Results   Component Value Date    ALT 25 01/09/2017     Lab Results   Component Value Date    A1C 8.3 01/09/2017    A1C 11.1 12/15/2015    A1C 8.0 07/03/2015    A1C 7.6 02/16/2015    A1C 7.9 01/24/2014     Potassium   Date Value Ref Range Status   01/09/2017 4.0 3.4 - 5.3 mmol/L Final

## 2017-05-22 ENCOUNTER — OFFICE VISIT (OUTPATIENT)
Dept: FAMILY MEDICINE | Facility: OTHER | Age: 37
End: 2017-05-22
Payer: COMMERCIAL

## 2017-05-22 VITALS
SYSTOLIC BLOOD PRESSURE: 133 MMHG | HEART RATE: 90 BPM | OXYGEN SATURATION: 96 % | DIASTOLIC BLOOD PRESSURE: 82 MMHG | RESPIRATION RATE: 26 BRPM | HEIGHT: 64 IN | BODY MASS INDEX: 50.69 KG/M2 | WEIGHT: 296.9 LBS | TEMPERATURE: 97.9 F

## 2017-05-22 DIAGNOSIS — E11.9 TYPE 2 DIABETES MELLITUS WITHOUT COMPLICATION, WITHOUT LONG-TERM CURRENT USE OF INSULIN (H): Primary | ICD-10-CM

## 2017-05-22 LAB
CREAT UR-MCNC: 158 MG/DL
HBA1C MFR BLD: 7.6 % (ref 4.3–6)
MICROALBUMIN UR-MCNC: 58 MG/L
MICROALBUMIN/CREAT UR: 36.77 MG/G CR (ref 0–17)

## 2017-05-22 PROCEDURE — 82043 UR ALBUMIN QUANTITATIVE: CPT | Performed by: FAMILY MEDICINE

## 2017-05-22 PROCEDURE — 99213 OFFICE O/P EST LOW 20 MIN: CPT | Performed by: FAMILY MEDICINE

## 2017-05-22 PROCEDURE — 83036 HEMOGLOBIN GLYCOSYLATED A1C: CPT | Performed by: FAMILY MEDICINE

## 2017-05-22 PROCEDURE — 36415 COLL VENOUS BLD VENIPUNCTURE: CPT | Performed by: FAMILY MEDICINE

## 2017-05-22 RX ORDER — GLIPIZIDE 10 MG/1
10 TABLET ORAL
Qty: 180 TABLET | Refills: 3 | Status: SHIPPED | OUTPATIENT
Start: 2017-05-22 | End: 2018-03-27

## 2017-05-22 ASSESSMENT — PAIN SCALES - GENERAL: PAINLEVEL: MILD PAIN (2)

## 2017-05-22 NOTE — PROGRESS NOTES
"SUBJECTIVE:                                                    Waqas Allen is a 37 year old male who presents to clinic today for the following health issues:      Diabetes Follow-up      Patient is checking blood sugars: not at all    Diabetic concerns: None     Symptoms of hypoglycemia (low blood sugar): none     Paresthesias (numbness or burning in feet) or sores: No     Date of last diabetic eye exam: one year ago     Hyperlipidemia Follow-Up      Rate your low fat/cholesterol diet?: fair    Taking statin?  No    Other lipid medications/supplements?:  none     Hypertension Follow-up      Outpatient blood pressures are not being checked.    Low Salt Diet: low salt         Amount of exercise or physical activity: None    Problems taking medications regularly: large pills    Medication side effects: none    Diet: diabetic      Problem list and histories reviewed & adjusted, as indicated.    C: NEGATIVE for fever, chills, change in weight  E/M: NEGATIVE for ear, mouth and throat problems  R: NEGATIVE for significant cough or SOB  CV: NEGATIVE for chest pain, palpitations or peripheral edema    OBJECTIVE:                                                    /82 (BP Location: Right arm, Patient Position: Chair, Cuff Size: Adult Large)  Pulse 90  Temp 97.9  F (36.6  C) (Temporal)  Resp 26  Ht 5' 4\" (1.626 m)  Wt 296 lb 14.4 oz (134.7 kg)  SpO2 96%  BMI 50.96 kg/m2  Body mass index is 50.96 kg/(m^2).    See dictated note     ASSESSMENT/PLAN:                                                    Small umbilical hernia    Michael Palumbo MD, MD  [facility]          "

## 2017-05-22 NOTE — MR AVS SNAPSHOT
"              After Visit Summary   5/22/2017    Waqas Allen    MRN: [id number]           Patient Information     Date Of Birth          1980        Visit Information        Provider Department      5/22/2017 2:15 PM Michael Palumbo MD [facility]        Today's Diagnoses     Type 2 diabetes mellitus without complication, without long-term current use of insulin (H)    -  1       Follow-ups after your visit        Who to contact     If you have questions or need follow up information about today's clinic visit or your schedule please contact [facility] directly at [phone].  Normal or non-critical lab and imaging results will be communicated to you by MyChart, letter or phone within 4 business days after the clinic has received the results. If you do not hear from us within 7 days, please contact the clinic through [email or web address] or phone. If you have a critical or abnormal lab result, we will notify you by phone as soon as possible.  Submit refill requests through [employer] or call your pharmacy and they will forward the refill request to us. Please allow 3 business days for your refill to be completed.          Additional Information About Your Visit        MyChart Information     [employer] gives you secure access to your electronic health record. If you see a primary care provider, you can also send messages to your care team and make appointments. If you have questions, please call your primary care clinic.  If you do not have a primary care provider, please call [phone] and they will assist you.        Care EveryWhere ID     This is your Care EveryWhere ID. This could be used by other organizations to access your [facility] medical records  [id number]        Your Vitals Were     Pulse Temperature Respirations Height Pulse Oximetry BMI (Body Mass Index)    90 97.9  F (36.6  C) (Temporal) 26 5' 4\" (1.626 m) 96% 50.96 kg/m2       Blood Pressure from Last 3 Encounters: "   05/22/17 133/82   02/01/17 144/78   01/18/17 128/78    Weight from Last 3 Encounters:   05/22/17 296 lb 14.4 oz (134.7 kg)   02/16/17 (!) 311 lb (141.1 kg)   02/01/17 (!) 312 lb 1.6 oz (141.6 kg)              We Performed the Following     Albumin Random Urine Quantitative     Hemoglobin A1c          Today's Medication Changes          These changes are accurate as of: 5/22/17  3:29 PM.  If you have any questions, ask your nurse or doctor.               These medicines have changed or have updated prescriptions.        Dose/Directions    glipiZIDE 10 MG tablet   Commonly known as:  GLUCOTROL   This may have changed:  medication strength   Used for:  Type 2 diabetes mellitus without complication, without long-term current use of insulin (H)   Changed by:  Michael Palumbo MD        Dose:  10 mg   Take 1 tablet (10 mg) by mouth 2 times daily (before meals)   Quantity:  180 tablet   Refills:  3            Where to get your medicines      These medications were sent to [id number] - [place], [place] - [address] [place] [address]  [address] [place] [id number]    Hours:  M-F 8:30-6:30; Sat 9-4; closed Sunday Phone:  [phone]     glipiZIDE 10 MG tablet                Primary Care Provider Office Phone # Fax #    Michael Palumbo [phone]       [facility] [address] [facility] [address]        Thank you!     Thank you for choosing [facility]  for your care. Our goal is always to provide you with excellent care. Hearing back from our patients is one way we can continue to improve our services. Please take a few minutes to complete the written survey that you may receive in the mail after your visit with us. Thank you!             Your Updated Medication List - Protect others around you: Learn how to safely use, store and throw away your medicines at www.disposemymeds.org.          This list is accurate as of: 5/22/17  3:29 PM.  Always use your most recent med  list.                   Brand Name Dispense Instructions for use    aspirin 81 MG tablet     100    ONE DAILY       blood glucose monitoring lancets     3 Box    1 each 2 times daily       blood glucose monitoring test strip    ACCU-CHEK SMARTVIEW    100 each    1 strip by In Vitro route 2 times daily       glipiZIDE 10 MG tablet    GLUCOTROL    180 tablet    Take 1 tablet (10 mg) by mouth 2 times daily (before meals)       losartan 50 MG tablet    COZAAR    90 tablet    TAKE 1 TABLET BY MOUTH EVER Y DAY       minocycline 100 MG capsule    MINOCIN/DYNACIN    60 capsule    TAKE 1 CAPSULE BY MOUTH TWI CE A DAY       pioglitazone 15 MG tablet    ACTOS    30 tablet    TAKE 1 TABLET BY MOUTH EVER Y DAY       POTASSIUM GLUCONATE PO      Take 1 tablet by mouth daily       ranitidine 150 MG tablet    ZANTAC     Take 150 mg by mouth daily       zinc 50 MG Tabs      Take 1 tablet by mouth daily

## 2017-05-23 NOTE — PROGRESS NOTES
SUBJECTIVE:  Mr. Waqas Allen is a 37-year-old man in for followup of his diabetes and hypertension.       Blood pressure today is acceptable.  His last A1c was unacceptable at over 8.  It has improved markedly today to 7.6.  I do refill his medications and we will repeat A1c in 6 months.  He and his significant other have been watching diet and exercising more, although he remains obese.        He does have a dark pigmentation on his right face.  He is unconcerned about that; said he has had that for a long time.  I do note he is on minocycline and he is told that pigmentation can be result of that.  He is aware of that but he says it has not changed any and does not want to switch medications.        OBJECTIVE:      HEART:  His heart is regular.  No carotid bruits.     LUNGS:  Lungs sound clear.   EXTREMITIES:  Denies any symptoms including any foot problems.        PLAN:  He will stay on current medications.         KENTON MANSFIELD M.D.             D: 2017 17:58   T: 2017 09:25   MT: MARLENE#[id number]      Name:     WAQAS ALLEN   MRN:      [id number]        Account:      [id number]   :      1980           Visit Date:   2017      Document: [id number]

## 2017-06-14 DIAGNOSIS — L70.0 ACNE VULGARIS: ICD-10-CM

## 2017-06-14 DIAGNOSIS — I10 HYPERTENSION GOAL BP (BLOOD PRESSURE) < 140/90: ICD-10-CM

## 2017-06-14 RX ORDER — MINOCYCLINE HYDROCHLORIDE 100 MG/1
CAPSULE ORAL
Qty: 60 CAPSULE | Refills: 5 | Status: SHIPPED | OUTPATIENT
Start: 2017-06-14 | End: 2017-12-21

## 2017-06-14 RX ORDER — LOSARTAN POTASSIUM 50 MG/1
TABLET ORAL
Qty: 90 TABLET | Refills: 1 | Status: SHIPPED | OUTPATIENT
Start: 2017-06-14 | End: 2017-12-21

## 2017-06-14 NOTE — TELEPHONE ENCOUNTER
minocycline (MINOCIN/DYNACIN) 100 MG capsule      Last Written Prescription Date: 2/15/17  Last Fill Quantity: 60,  # refills: 3   Last Office Visit with [facility], [facility] or [facility] prescribing provider: 5/22/17

## 2017-06-14 NOTE — TELEPHONE ENCOUNTER
Prescription approved per [facility] Refill Protocol.    Mary Marroquin RN  [facility]

## 2017-06-14 NOTE — TELEPHONE ENCOUNTER
losartan (COZAAR) 50 MG tablet      Last Written Prescription Date: 3/17/17  Last Fill Quantity: 90, # refills: 0  Last Office Visit with [facility] or [facility] prescribing provider: 5/22/17       Potassium   Date Value Ref Range Status   01/09/2017 4.0 3.4 - 5.3 mmol/L Final     Creatinine   Date Value Ref Range Status   01/09/2017 0.93 0.66 - 1.25 mg/dL Final     BP Readings from Last 3 Encounters:   05/22/17 133/82   02/01/17 144/78   01/18/17 128/78

## 2017-06-14 NOTE — TELEPHONE ENCOUNTER
Routing refill request to provider for review/approval because:  Drug interaction warning    Mary Marroquin, RN  [facility]

## 2017-08-18 DIAGNOSIS — E11.9 TYPE 2 DIABETES MELLITUS WITHOUT COMPLICATION, WITHOUT LONG-TERM CURRENT USE OF INSULIN (H): ICD-10-CM

## 2017-08-18 NOTE — TELEPHONE ENCOUNTER
Routing refill request to provider for review/approval because:  Labs not current:  Microalbumin    Mary Marroquin, RN  [facility]

## 2017-08-18 NOTE — TELEPHONE ENCOUNTER
[employer]         Last Written Prescription Date: 4/18/17  Last Fill Quantity: 30, # refills: 3  Last Office Visit with [facility], [facility] or [facility] prescribing provider:  5/22/17        BP Readings from Last 3 Encounters:   05/22/17 133/82   02/01/17 144/78   01/18/17 128/78     Lab Results   Component Value Date    MICROL 58 05/22/2017     Lab Results   Component Value Date    UMALCR 36.77 05/22/2017     Creatinine   Date Value Ref Range Status   01/09/2017 0.93 0.66 - 1.25 mg/dL Final   ]  GFR Estimate   Date Value Ref Range Status   01/09/2017 >90  Non  GFR Calc   >60 mL/min/1.7m2 Final   01/11/2016 >90  Non  GFR Calc   >60 mL/min/1.7m2 Final   02/16/2015 >90  Non  GFR Calc   >60 mL/min/1.7m2 Final     GFR Estimate If Black   Date Value Ref Range Status   01/09/2017 >90   GFR Calc   >60 mL/min/1.7m2 Final   01/11/2016 >90   GFR Calc   >60 mL/min/1.7m2 Final   02/16/2015 >90   GFR Calc   >60 mL/min/1.7m2 Final     No results found for: CHOL  No results found for: HDL  Lab Results   Component Value Date    LDL 58 01/09/2017     No results found for: TRIG  No results found for: CHOLHDLRATIO  Lab Results   Component Value Date    AST 14 01/09/2017     Lab Results   Component Value Date    ALT 25 01/09/2017     Lab Results   Component Value Date    A1C 7.6 05/22/2017    A1C 8.3 01/09/2017    A1C 11.1 12/15/2015    A1C 8.0 07/03/2015    A1C 7.6 02/16/2015     Potassium   Date Value Ref Range Status   01/09/2017 4.0 3.4 - 5.3 mmol/L Final

## 2017-08-21 RX ORDER — PIOGLITAZONEHYDROCHLORIDE 15 MG/1
TABLET ORAL
Qty: 30 TABLET | Refills: 3 | Status: SHIPPED | OUTPATIENT
Start: 2017-08-21 | End: 2017-12-21

## 2017-11-06 ENCOUNTER — HOSPITAL ENCOUNTER (EMERGENCY)
Facility: CLINIC | Age: 37
Discharge: HOME | End: 2017-11-06
Attending: FAMILY MEDICINE | Admitting: FAMILY MEDICINE
Payer: COMMERCIAL

## 2017-11-06 ENCOUNTER — TELEPHONE (OUTPATIENT)
Dept: FAMILY MEDICINE | Facility: OTHER | Age: 37
End: 2017-11-06

## 2017-11-06 ENCOUNTER — APPOINTMENT (OUTPATIENT)
Dept: CT IMAGING | Facility: CLINIC | Age: 37
End: 2017-11-06
Attending: FAMILY MEDICINE
Payer: COMMERCIAL

## 2017-11-06 VITALS
TEMPERATURE: 97.9 F | RESPIRATION RATE: 20 BRPM | HEART RATE: 90 BPM | DIASTOLIC BLOOD PRESSURE: 90 MMHG | BODY MASS INDEX: 53.9 KG/M2 | WEIGHT: 314 LBS | OXYGEN SATURATION: 97 % | SYSTOLIC BLOOD PRESSURE: 139 MMHG

## 2017-11-06 DIAGNOSIS — K63.89 EPIPLOIC APPENDAGITIS: ICD-10-CM

## 2017-11-06 LAB
ALBUMIN SERPL-MCNC: 3.4 G/DL (ref 3.4–5)
ALBUMIN UR-MCNC: NEGATIVE MG/DL
ALP SERPL-CCNC: 162 U/L (ref 40–150)
ALT SERPL W P-5'-P-CCNC: 26 U/L (ref 0–70)
ANION GAP SERPL CALCULATED.3IONS-SCNC: 10 MMOL/L (ref 3–14)
APPEARANCE UR: CLEAR
AST SERPL W P-5'-P-CCNC: 11 U/L (ref 0–45)
BACTERIA #/AREA URNS HPF: ABNORMAL /HPF
BASOPHILS # BLD AUTO: 0 10E9/L (ref 0–0.2)
BASOPHILS NFR BLD AUTO: 0.2 %
BILIRUB DIRECT SERPL-MCNC: <0.1 MG/DL (ref 0–0.2)
BILIRUB SERPL-MCNC: 0.3 MG/DL (ref 0.2–1.3)
BILIRUB UR QL STRIP: NEGATIVE
BUN SERPL-MCNC: 17 MG/DL (ref 7–30)
CALCIUM SERPL-MCNC: 8.3 MG/DL (ref 8.5–10.1)
CHLORIDE SERPL-SCNC: 99 MMOL/L (ref 94–109)
CO2 SERPL-SCNC: 26 MMOL/L (ref 20–32)
COLOR UR AUTO: YELLOW
CREAT SERPL-MCNC: 0.99 MG/DL (ref 0.66–1.25)
DIFFERENTIAL METHOD BLD: ABNORMAL
EOSINOPHIL # BLD AUTO: 0.2 10E9/L (ref 0–0.7)
EOSINOPHIL NFR BLD AUTO: 1.2 %
ERYTHROCYTE [DISTWIDTH] IN BLOOD BY AUTOMATED COUNT: 13.5 % (ref 10–15)
GFR SERPL CREATININE-BSD FRML MDRD: 85 ML/MIN/1.7M2
GLUCOSE SERPL-MCNC: 332 MG/DL (ref 70–99)
GLUCOSE UR STRIP-MCNC: >499 MG/DL
HCT VFR BLD AUTO: 46.8 % (ref 40–53)
HGB BLD-MCNC: 15.5 G/DL (ref 13.3–17.7)
HGB UR QL STRIP: NEGATIVE
IMM GRANULOCYTES # BLD: 0.1 10E9/L (ref 0–0.4)
IMM GRANULOCYTES NFR BLD: 0.8 %
KETONES UR STRIP-MCNC: NEGATIVE MG/DL
LEUKOCYTE ESTERASE UR QL STRIP: NEGATIVE
LIPASE SERPL-CCNC: 125 U/L (ref 73–393)
LYMPHOCYTES # BLD AUTO: 3.8 10E9/L (ref 0.8–5.3)
LYMPHOCYTES NFR BLD AUTO: 30.6 %
MCH RBC QN AUTO: 28.4 PG (ref 26.5–33)
MCHC RBC AUTO-ENTMCNC: 33.1 G/DL (ref 31.5–36.5)
MCV RBC AUTO: 86 FL (ref 78–100)
MONOCYTES # BLD AUTO: 0.7 10E9/L (ref 0–1.3)
MONOCYTES NFR BLD AUTO: 5.8 %
MUCOUS THREADS #/AREA URNS LPF: PRESENT /LPF
NEUTROPHILS # BLD AUTO: 7.6 10E9/L (ref 1.6–8.3)
NEUTROPHILS NFR BLD AUTO: 61.4 %
NITRATE UR QL: NEGATIVE
PH UR STRIP: 5 PH (ref 5–7)
PLATELET # BLD AUTO: 256 10E9/L (ref 150–450)
POTASSIUM SERPL-SCNC: 4.1 MMOL/L (ref 3.4–5.3)
PROT SERPL-MCNC: 7.3 G/DL (ref 6.8–8.8)
RBC # BLD AUTO: 5.45 10E12/L (ref 4.4–5.9)
RBC #/AREA URNS AUTO: <1 /HPF (ref 0–2)
SODIUM SERPL-SCNC: 135 MMOL/L (ref 133–144)
SOURCE: ABNORMAL
SP GR UR STRIP: 1.03 (ref 1–1.03)
UROBILINOGEN UR STRIP-MCNC: 0 MG/DL (ref 0–2)
WBC # BLD AUTO: 12.3 10E9/L (ref 4–11)
WBC #/AREA URNS AUTO: 1 /HPF (ref 0–2)

## 2017-11-06 PROCEDURE — 81001 URINALYSIS AUTO W/SCOPE: CPT | Performed by: FAMILY MEDICINE

## 2017-11-06 PROCEDURE — 25000128 H RX IP 250 OP 636: Performed by: FAMILY MEDICINE

## 2017-11-06 PROCEDURE — 85025 COMPLETE CBC W/AUTO DIFF WBC: CPT | Performed by: FAMILY MEDICINE

## 2017-11-06 PROCEDURE — 80048 BASIC METABOLIC PNL TOTAL CA: CPT | Performed by: FAMILY MEDICINE

## 2017-11-06 PROCEDURE — 74177 CT ABD & PELVIS W/CONTRAST: CPT

## 2017-11-06 PROCEDURE — 25000125 ZZHC RX 250: Performed by: FAMILY MEDICINE

## 2017-11-06 PROCEDURE — 83690 ASSAY OF LIPASE: CPT | Performed by: FAMILY MEDICINE

## 2017-11-06 PROCEDURE — 99284 EMERGENCY DEPT VISIT MOD MDM: CPT | Mod: Z6 | Performed by: FAMILY MEDICINE

## 2017-11-06 PROCEDURE — 99285 EMERGENCY DEPT VISIT HI MDM: CPT | Mod: 25 | Performed by: FAMILY MEDICINE

## 2017-11-06 PROCEDURE — 80076 HEPATIC FUNCTION PANEL: CPT | Performed by: FAMILY MEDICINE

## 2017-11-06 RX ORDER — OXYCODONE AND ACETAMINOPHEN 5; 325 MG/1; MG/1
1-2 TABLET ORAL EVERY 4 HOURS PRN
Qty: 15 TABLET | Refills: 0 | Status: SHIPPED | OUTPATIENT
Start: 2017-11-06 | End: 2018-02-05

## 2017-11-06 RX ORDER — IOPAMIDOL 755 MG/ML
100 INJECTION, SOLUTION INTRAVASCULAR ONCE
Status: COMPLETED | OUTPATIENT
Start: 2017-11-06 | End: 2017-11-06

## 2017-11-06 RX ADMIN — SODIUM CHLORIDE 57 ML: 9 INJECTION, SOLUTION INTRAVENOUS at 19:11

## 2017-11-06 RX ADMIN — IOPAMIDOL 100 ML: 755 INJECTION, SOLUTION INTRAVENOUS at 19:10

## 2017-11-06 ASSESSMENT — ENCOUNTER SYMPTOMS
ABDOMINAL PAIN: 1
FEVER: 0
HEMATURIA: 0
BLOOD IN STOOL: 0
CHILLS: 0
DYSURIA: 0

## 2017-11-06 NOTE — ED PROVIDER NOTES
History     Chief Complaint   Patient presents with     Abdominal Pain     The history is provided by the patient.     Waqas Allen is a 37 year old male who presents to the emergency department with concerns of abdominal pain. The patient states that his pain began on Friday and has been constant since. He reports spurts of increased pain. Pain is worsened by taking a deep breath. His pain is across the middle of his abdomen and today it started radiating down the center. The patient denies having dysuria, hematuria, blood in stool, fever and chills.     Problem List:    Patient Active Problem List    Diagnosis Date Noted     AC joint arthropathy 01/06/2017     Priority: Medium     Type 2 diabetes mellitus without complication, without long-term current use of insulin (H) 12/15/2015     Priority: Medium     Sprain of acromioclavicular ligament 11/25/2014     Priority: Medium     Problem list name updated by automated process. Provider to review       Hypertension goal BP (blood pressure) < 140/90 10/02/2011     Priority: Medium     Acne 06/09/2010     Priority: Medium        Past Medical History:    Past Medical History:   Diagnosis Date     Unspecified essential hypertension      Variants of migraine, not elsewhere classified, without mention of intractable migraine without mention of status migrainosus      Varicella without mention of complication 1993       Past Surgical History:    Past Surgical History:   Procedure Laterality Date     ARTHROSCOPY SHOULDER DECOMPRESSION Left 1/20/2016    Procedure: ARTHROSCOPY SHOULDER DECOMPRESSION;  Surgeon: Watson Duckworth MD;  Location: [facility] OR     ARTHROSCOPY SHOULDER DECOMPRESSION Right 2/1/2017    Procedure: ARTHROSCOPY SHOULDER DECOMPRESSION;  Surgeon: Watson Duckworth MD;  Location: [facility] OR     ARTHROSCOPY SHOULDER DISTAL CLAVICLE REPAIR Left 1/20/2016    Procedure: ARTHROSCOPY SHOULDER DISTAL CLAVICLE RESECTION;  Surgeon: Watson Duckworth MD;   Location: [facility] OR     ARTHROSCOPY SHOULDER DISTAL CLAVICLE REPAIR Right 2/1/2017    Procedure: ARTHROSCOPY SHOULDER DISTAL CLAVICLE RESECTION;  Surgeon: Watson Duckworth MD;  Location: [facility] OR     LAMINECT/DISCECTOMY, LUMBAR  04/24/2007    Left L3-L4 Hemilaminectomy/Microdiskectomy.  [facility]       Family History:    Family History   Problem Relation Age of Onset     Hypertension Brother      CEREBROVASCULAR DISEASE Maternal Grandfather      Unknown/Adopted Father        Social History:  Marital Status:  Single [1]  Social History   Substance Use Topics     Smoking status: Never Smoker     Smokeless tobacco: Never Used     Alcohol use 0.0 oz/week     0 Standard drinks or equivalent per week      Comment: occ        Medications:      pioglitazone (ACTOS) 15 MG tablet   minocycline (MINOCIN/DYNACIN) 100 MG capsule   losartan (COZAAR) 50 MG tablet   glipiZIDE (GLUCOTROL) 10 MG tablet   blood glucose monitoring (SOFTCLIX) lancets   blood glucose monitoring (ACCU-CHEK SMARTVIEW) test strip   zinc 50 MG TABS   POTASSIUM GLUCONATE PO   ranitidine (ZANTAC) 150 MG tablet   ASPIRIN 81 MG OR TABS         Review of Systems   Constitutional: Negative for chills and fever.   Gastrointestinal: Positive for abdominal pain. Negative for blood in stool.   Genitourinary: Negative for dysuria and hematuria.   All other systems reviewed and are negative.      Physical Exam   BP: (!) 194/104  Pulse: 90  Resp: 20  Weight: (!) 142.4 kg (314 lb)  SpO2: 99 %      Physical Exam   Constitutional: He is oriented to person, place, and time. He appears well-developed and well-nourished.   HENT:   Head: Atraumatic.   Eyes: EOM are normal.   Neck: Neck supple.   Cardiovascular: Normal rate, regular rhythm and normal heart sounds.    Pulmonary/Chest: Effort normal and breath sounds normal.   Abdominal: Soft. Bowel sounds are normal. There is no tenderness.   Musculoskeletal: Normal range of motion.   Neurological: He is alert and oriented to  person, place, and time.   Skin: Skin is warm and dry.   Psychiatric: He has a normal mood and affect. His behavior is normal.   Nursing note and vitals reviewed.      ED Course     ED Course     Procedures           Results for orders placed or performed during the hospital encounter of 11/06/17   CT Abdomen Pelvis w Contrast    Narrative    CT ABDOMEN PELVIS WITH CONTRAST 11/6/2017 7:22 PM     HISTORY: Abdominal pain, elevated WBC.     TECHNIQUE: Axial images from the lung bases to the symphysis are  performed with additional coronal reformatted images. 98 mL of Isovue  370 are given intravenously.  Radiation dose for this scan was reduced  using automated exposure control, adjustment of the mA and/or kV  according to patient size, or iterative reconstruction technique.    FINDINGS: The lung bases are clear.    Abdomen: The liver, spleen, decompressed gallbladder, pancreas,  adrenal glands and kidneys are unremarkable. No hydronephrosis. No  evidence of renal calculi. No enlarged lymph nodes. Aorta is  unremarkable. The bowel is normal in caliber without obstruction or  diverticulitis. Focal inflammation surrounding a central nidus of fat  is noted in the right mid ascending colon on series 2, image 48 and  series 3, image 81. This is most likely an area of epiploic  appendagitis. Appendix is normal.    Pelvis: The bladder is decompressed but otherwise unremarkable. The  prostate gland and rectum are unremarkable. No enlarged pelvic lymph  nodes or free fluid. Bone window examination is unremarkable.      Impression    IMPRESSION:  1. Epiploic appendagitis mid ascending colon. No evidence of bowel  obstruction, diverticulitis or appendicitis.  2. Abdominal and pelvic organs are otherwise within normal limits.      DIANA ISBELL MD   CBC with platelets differential   Result Value Ref Range    WBC 12.3 (H) 4.0 - 11.0 10e9/L    RBC Count 5.45 4.4 - 5.9 10e12/L    Hemoglobin 15.5 13.3 - 17.7 g/dL    Hematocrit 46.8 40.0  - 53.0 %    MCV 86 78 - 100 fl    MCH 28.4 26.5 - 33.0 pg    MCHC 33.1 31.5 - 36.5 g/dL    RDW 13.5 10.0 - 15.0 %    Platelet Count 256 150 - 450 10e9/L    Diff Method Automated Method     % Neutrophils 61.4 %    % Lymphocytes 30.6 %    % Monocytes 5.8 %    % Eosinophils 1.2 %    % Basophils 0.2 %    % Immature Granulocytes 0.8 %    Absolute Neutrophil 7.6 1.6 - 8.3 10e9/L    Absolute Lymphocytes 3.8 0.8 - 5.3 10e9/L    Absolute Monocytes 0.7 0.0 - 1.3 10e9/L    Absolute Eosinophils 0.2 0.0 - 0.7 10e9/L    Absolute Basophils 0.0 0.0 - 0.2 10e9/L    Abs Immature Granulocytes 0.1 0 - 0.4 10e9/L   Basic metabolic panel   Result Value Ref Range    Sodium 135 133 - 144 mmol/L    Potassium 4.1 3.4 - 5.3 mmol/L    Chloride 99 94 - 109 mmol/L    Carbon Dioxide 26 20 - 32 mmol/L    Anion Gap 10 3 - 14 mmol/L    Glucose 332 (H) 70 - 99 mg/dL    Urea Nitrogen 17 7 - 30 mg/dL    Creatinine 0.99 0.66 - 1.25 mg/dL    GFR Estimate 85 >60 mL/min/1.7m2    GFR Estimate If Black >90 >60 mL/min/1.7m2    Calcium 8.3 (L) 8.5 - 10.1 mg/dL   Lipase   Result Value Ref Range    Lipase 125 73 - 393 U/L   Hepatic panel   Result Value Ref Range    Bilirubin Direct <0.1 0.0 - 0.2 mg/dL    Bilirubin Total 0.3 0.2 - 1.3 mg/dL    Albumin 3.4 3.4 - 5.0 g/dL    Protein Total 7.3 6.8 - 8.8 g/dL    Alkaline Phosphatase 162 (H) 40 - 150 U/L    ALT 26 0 - 70 U/L    AST 11 0 - 45 U/L   UA with Microscopic   Result Value Ref Range    Color Urine Yellow     Appearance Urine Clear     Glucose Urine >499 (A) NEG^Negative mg/dL    Bilirubin Urine Negative NEG^Negative    Ketones Urine Negative NEG^Negative mg/dL    Specific Gravity Urine 1.029 1.003 - 1.035    Blood Urine Negative NEG^Negative    pH Urine 5.0 5.0 - 7.0 pH    Protein Albumin Urine Negative NEG^Negative mg/dL    Urobilinogen mg/dL 0.0 0.0 - 2.0 mg/dL    Nitrite Urine Negative NEG^Negative    Leukocyte Esterase Urine Negative NEG^Negative    Source Unspecified Urine     WBC Urine 1 0 - 2 /HPF     RBC Urine <1 0 - 2 /HPF    Bacteria Urine Few (A) NEG^Negative /HPF    Mucous Urine Present (A) NEG^Negative /LPF     Medications   iopamidol (ISOVUE-370) solution 100 mL (100 mLs Intravenous Given 11/6/17 1910)   sodium chloride 0.9 % bag 500mL for CT scan flush use (57 mLs Intravenous Given 11/6/17 1911)   sodium chloride (PF) 0.9% PF flush 3 mL (3 mLs Intracatheter Given 11/6/17 1911)     labs are reviewed patient did have an elevated white blood cell count but the urine was unremarkable.  I did a CT scan of the abdomen which did show Epiploic appendagitis but no other etiology.  At this point think it's safe to discharge the patient home.  I will discharge him home with Percocet for pain and he will use over-the-counter ibuprofen.     Assessments & Plan (with Medical Decision Making)  Epiploic appendagitis      I have reviewed the nursing notes.    I have reviewed the findings, diagnosis, plan and need for follow up with the patient.      New Prescriptions    OXYCODONE-ACETAMINOPHEN (PERCOCET) 5-325 MG PER TABLET    Take 1-2 tablets by mouth every 4 hours as needed for pain       Final diagnoses:   Epiploic appendagitis     This document serves as a record of services personally performed by Leighton Armenta MD. It was created on their behalf by Kala Woods, a trained medical scribe. The creation of this record is based on the provider's personal observations and the statements of the patient. This document has been checked and approved by the attending provider.    Note: Chart documentation done in part with Dragon Voice Recognition software. Although reviewed after completion, some word and grammatical errors may remain.    11/6/2017   [facility] EMERGENCY DEPARTMENT     Leighton Armenta MD  11/06/17 1951

## 2017-11-06 NOTE — ED AVS SNAPSHOT
[facility] Emergency Department    [address] [place] [address]    Phone:  [phone]    Fax:  [phone]                                       Waqas Allen   MRN: [id number]    Department:  [facility] Emergency Department   Date of Visit:  11/6/2017           After Visit Summary Signature Page     I have received my discharge instructions, and my questions have been answered. I have discussed any challenges I see with this plan with the nurse or doctor.    ..........................................................................................................................................  Patient/Patient Representative Signature      ..........................................................................................................................................  Patient Representative Print Name and Relationship to Patient    ..................................................               ................................................  Date                                            Time    ..........................................................................................................................................  Reviewed by Signature/Title    ...................................................              ..............................................  Date                                                            Time

## 2017-11-06 NOTE — ED NOTES
Patient c/o mid abdominal pain since Friday. Pain is now radiating to umbilical area to supra pubic area.

## 2017-11-06 NOTE — ED AVS SNAPSHOT
[facility] Emergency Department    [address]     [place] [address]    Phone:  [phone]    Fax:  [phone]                                       Waqas Allen   MRN: [id number]    Department:  [facility] Emergency Department   Date of Visit:  11/6/2017           Patient Information     Date Of Birth          1980        Your diagnoses for this visit were:     Epiploic appendagitis        You were seen by Leighton Armenta MD.      Follow-up Information     Follow up with Michael Palumbo MD. Schedule an appointment as soon as possible for a visit in 3 days.    Specialty:  Family Practice    Why:  If not improving.    Contact information:    [address] [facility] [address] [phone]          Discharge Instructions       1.  I want you to take over the counter Advil as you need an anti-inflammatory on board to help this condition.  You can use the Percocet on top of this for any breakthrough pain.  2.  Please return to the emergency department if you do develop a fever or if he start having vomiting.  3.  See your doctor if there is no improvement over the next few days.        Anatomy of the Digestive System    Food gives the body the energy needed for life. The digestive system breaks food down into basic nutrients that can be used by the body. The digestive tract is a long, muscular tube that extends from the mouth through the stomach and intestines to the anus. As food moves along the digestive tract, it is digested (changed into substances that can be absorbed into the bloodstream). Certain organs (such as the liver, gallbladder, and pancreas) help with this digestion. Parts of food that cannot be digested are turned into stool, which is waste material that is passed out of the body.  Digestive system  The digestive system is made up of the following:    The mouth takes in food, breaks it into pieces, and begins the process of digestion.    The  esophagus moves food from the mouth to the stomach.    The stomach breaks food down into a liquid mixture.    The liver makes bile that helps digest fat.    The gallbladder stores bile.    The pancreas makes enzymes that help in digestion.    The small intestine digests food further and absorbs nutrients. What is left is passed on to the colon as liquid waste.    The large intestine (colon) absorbs water, salt, and minerals from the waste, forming a solid stool.    The rectum stores stool until a bowel movement happens.    The anus is the opening where stool leaves the body.  Date Last Reviewed: 7/1/2016 2000-2017 [employer]. [address] [place] [address]. All rights reserved. This information is not intended as a substitute for professional medical care. Always follow your healthcare professional's instructions.          Future Appointments        Provider Department Dept Phone Center    11/7/2017 10:40 AM Ximena Liz PA-C [facility] [phone] [facility]      24 Hour Appointment Hotline       To make an appointment at any [facility], call [phone] ([phone]). If you don't have a family doctor or clinic, we will help you find one. [facility] are conveniently located to serve the needs of you and your family.             Review of your medicines      START taking        Dose / Directions Last dose taken    oxyCODONE-acetaminophen 5-325 MG per tablet   Commonly known as:  PERCOCET   Dose:  1-2 tablet   Quantity:  15 tablet        Take 1-2 tablets by mouth every 4 hours as needed for pain   Refills:  0          Our records show that you are taking the medicines listed below. If these are incorrect, please call your family doctor or clinic.        Dose / Directions Last dose taken    aspirin 81 MG tablet   Quantity:  100        ONE DAILY   Refills:  3        blood glucose monitoring lancets   Dose:  1 each   Quantity:  3 Box        1 each 2  times daily   Refills:  2        blood glucose monitoring test strip   Commonly known as:  ACCU-CHEK SMARTVIEW   Dose:  1 strip   Quantity:  100 each        1 strip by In Vitro route 2 times daily   Refills:  11        glipiZIDE 10 MG tablet   Commonly known as:  GLUCOTROL   Dose:  10 mg   Quantity:  180 tablet        Take 1 tablet (10 mg) by mouth 2 times daily (before meals)   Refills:  3        losartan 50 MG tablet   Commonly known as:  COZAAR   Quantity:  90 tablet        TAKE 1 TABLET BY MOUTH EVERY DAY   Refills:  1        minocycline 100 MG capsule   Commonly known as:  MINOCIN/DYNACIN   Quantity:  60 capsule        TAKE 1 CAPSULE BY MOUTH TWICE A DAY   Refills:  5        pioglitazone 15 MG tablet   Commonly known as:  ACTOS   Quantity:  30 tablet        TAKE 1 TABLET BY MOUTH EVERY DAY   Refills:  3        POTASSIUM GLUCONATE PO   Dose:  1 tablet        Take 1 tablet by mouth daily   Refills:  0        ranitidine 150 MG tablet   Commonly known as:  ZANTAC   Dose:  150 mg        Take 150 mg by mouth daily   Refills:  0        zinc 50 MG Tabs   Dose:  1 tablet        Take 1 tablet by mouth daily   Refills:  0                Prescriptions were sent or printed at these locations (1 Prescription)                   [id number] [address] [address]    Telephone:  [phone]   Fax:  [phone]   Hours:  M-F 8:30-6:30; Sat 9-4; closed Sunday                Printed at Department/Unit printer (1 of 1)         oxyCODONE-acetaminophen (PERCOCET) 5-325 MG per tablet                Procedures and tests performed during your visit     Basic metabolic panel    CBC with platelets differential    CT Abdomen Pelvis w Contrast    Give 20 ounces of water 15 minutes before CT of abdomen    Hepatic panel    Lipase    Peripheral IV: Standard    UA with Microscopic      Orders Needing Specimen Collection     None      Pending Results     No orders found from 11/4/2017 to  11/7/2017.            Pending Culture Results     No orders found from 11/4/2017 to 11/7/2017.            Pending Results Instructions     If you had any lab results that were not finalized at the time of your Discharge, you can call the ED Lab Result RN at [phone]. You will be contacted by this team for any positive Lab results or changes in treatment. The nurses are available 7 days a week from 10A to 6:30P.  You can leave a message 24 hours per day and they will return your call.        Thank you for choosing [facility]       Thank you for choosing [facility] for your care. Our goal is always to provide you with excellent care. Hearing back from our patients is one way we can continue to improve our services. Please take a few minutes to complete the written survey that you may receive in the mail after you visit with us. Thank you!        Working[employer] Information     Mobile Experience gives you secure access to your electronic health record. If you see a primary care provider, you can also send messages to your care team and make appointments. If you have questions, please call your primary care clinic.  If you do not have a primary care provider, please call [phone] and they will assist you.        Care EveryWhere ID     This is your Care EveryWhere ID. This could be used by other organizations to access your [place] medical records  [id number]        Equal Access to Services     JAY MARTINES : Brianda Mcnally, waaxda luqadaha, qaybta kaalmada adereyna, luly armendariz. So [facility] [phone].    ATENCIÓN: Si habla español, tiene a lancaster disposición servicios gratuitos de asistencia lingüística. Llame al [phone].    We comply with applicable federal civil rights laws and [place] laws. We do not discriminate on the basis of race, color, national origin, age, disability, sex, sexual orientation, or gender identity.            After Visit Summary       This is your record. Keep  this with you and show to your community pharmacist(s) and doctor(s) at your next visit.

## 2017-11-07 NOTE — DISCHARGE INSTRUCTIONS
1.  I want you to take over the counter Advil as you need an anti-inflammatory on board to help this condition.  You can use the Percocet on top of this for any breakthrough pain.  2.  Please return to the emergency department if you do develop a fever or if he start having vomiting.  3.  See your doctor if there is no improvement over the next few days.        Anatomy of the Digestive System    Food gives the body the energy needed for life. The digestive system breaks food down into basic nutrients that can be used by the body. The digestive tract is a long, muscular tube that extends from the mouth through the stomach and intestines to the anus. As food moves along the digestive tract, it is digested (changed into substances that can be absorbed into the bloodstream). Certain organs (such as the liver, gallbladder, and pancreas) help with this digestion. Parts of food that cannot be digested are turned into stool, which is waste material that is passed out of the body.  Digestive system  The digestive system is made up of the following:    The mouth takes in food, breaks it into pieces, and begins the process of digestion.    The esophagus moves food from the mouth to the stomach.    The stomach breaks food down into a liquid mixture.    The liver makes bile that helps digest fat.    The gallbladder stores bile.    The pancreas makes enzymes that help in digestion.    The small intestine digests food further and absorbs nutrients. What is left is passed on to the colon as liquid waste.    The large intestine (colon) absorbs water, salt, and minerals from the waste, forming a solid stool.    The rectum stores stool until a bowel movement happens.    The anus is the opening where stool leaves the body.  Date Last Reviewed: 7/1/2016 2000-2017 [employer]. [address] [place] [address], [place] [address]. All rights reserved. This information is not intended as a substitute for professional medical care.  Always follow your healthcare professional's instructions.

## 2017-11-27 ENCOUNTER — TELEPHONE (OUTPATIENT)
Dept: FAMILY MEDICINE | Facility: OTHER | Age: 37
End: 2017-11-27

## 2017-11-27 NOTE — TELEPHONE ENCOUNTER
Dr. Palumbo has opening on the 29 th, please schedule.  Dr. Palumbo is ok with short but inform only that issue.

## 2017-11-27 NOTE — TELEPHONE ENCOUNTER
Reason for Call:  Same Day Appointment, Requested Provider:  KISHORE Palumbo M.D.    PCP: Michael Palumbo    Reason for visit: ER f/u    Duration of symptoms:     Have you been treated for this in the past? Yes    Additional comments: Gwen is calling needing an appt today for Waqas. There are no openings today, can you work Waqas in today? He did make an appt on 11/30 but hoping to be seen today.     Can we leave a detailed message on this number? YES    Phone number patient can be reached at: Other phone number:      Best Time: anytime    Call taken on 11/27/2017 at 10:52 AM by Genevieve Ocampo

## 2017-11-27 NOTE — TELEPHONE ENCOUNTER
Waqas is keeping his appointment on 11-30-17 but would really like to see you, are you able to work him into your schedule on 11-28 or 11-29?

## 2017-11-27 NOTE — TELEPHONE ENCOUNTER
I left a call back message.  Deepali Iqbal stated he is unable to work this patient in today.  He recommends he keep his appointment on the 30th or see if another appointment is available.

## 2017-11-29 ENCOUNTER — OFFICE VISIT (OUTPATIENT)
Dept: FAMILY MEDICINE | Facility: OTHER | Age: 37
End: 2017-11-29
Payer: COMMERCIAL

## 2017-11-29 VITALS
BODY MASS INDEX: 53.97 KG/M2 | SYSTOLIC BLOOD PRESSURE: 130 MMHG | DIASTOLIC BLOOD PRESSURE: 82 MMHG | TEMPERATURE: 96.7 F | RESPIRATION RATE: 20 BRPM | HEART RATE: 80 BPM | WEIGHT: 314.4 LBS | OXYGEN SATURATION: 100 %

## 2017-11-29 DIAGNOSIS — R10.31 RLQ ABDOMINAL PAIN: Primary | ICD-10-CM

## 2017-11-29 LAB
BASOPHILS # BLD AUTO: 0 10E9/L (ref 0–0.2)
BASOPHILS NFR BLD AUTO: 0.2 %
DIFFERENTIAL METHOD BLD: NORMAL
EOSINOPHIL # BLD AUTO: 0.1 10E9/L (ref 0–0.7)
EOSINOPHIL NFR BLD AUTO: 1.2 %
ERYTHROCYTE [DISTWIDTH] IN BLOOD BY AUTOMATED COUNT: 13.6 % (ref 10–15)
HCT VFR BLD AUTO: 45.1 % (ref 40–53)
HGB BLD-MCNC: 15.1 G/DL (ref 13.3–17.7)
LYMPHOCYTES # BLD AUTO: 2.4 10E9/L (ref 0.8–5.3)
LYMPHOCYTES NFR BLD AUTO: 25.9 %
MCH RBC QN AUTO: 28.4 PG (ref 26.5–33)
MCHC RBC AUTO-ENTMCNC: 33.5 G/DL (ref 31.5–36.5)
MCV RBC AUTO: 85 FL (ref 78–100)
MONOCYTES # BLD AUTO: 0.6 10E9/L (ref 0–1.3)
MONOCYTES NFR BLD AUTO: 6.5 %
NEUTROPHILS # BLD AUTO: 6.1 10E9/L (ref 1.6–8.3)
NEUTROPHILS NFR BLD AUTO: 66.2 %
PLATELET # BLD AUTO: 212 10E9/L (ref 150–450)
RBC # BLD AUTO: 5.32 10E12/L (ref 4.4–5.9)
WBC # BLD AUTO: 9.2 10E9/L (ref 4–11)

## 2017-11-29 PROCEDURE — 36415 COLL VENOUS BLD VENIPUNCTURE: CPT | Performed by: FAMILY MEDICINE

## 2017-11-29 PROCEDURE — 85025 COMPLETE CBC W/AUTO DIFF WBC: CPT | Performed by: FAMILY MEDICINE

## 2017-11-29 PROCEDURE — 99213 OFFICE O/P EST LOW 20 MIN: CPT | Performed by: FAMILY MEDICINE

## 2017-11-29 ASSESSMENT — PAIN SCALES - GENERAL: PAINLEVEL: MODERATE PAIN (4)

## 2017-11-29 NOTE — PROGRESS NOTES
SUBJECTIVE:                                                    Waqas Allen is a 37 year old male who presents to clinic today for the following health issues:    ED/UC Followup:    Facility:  [facility] Emergency Room  Date of visit: 11/6/2017  Reason for visit: Stomach pain  Current Status: slight relief         Problem list and histories reviewed & adjusted, as indicated.    C: NEGATIVE for fever, chills, change in weight  E/M: NEGATIVE for ear, mouth and throat problems  R: NEGATIVE for significant cough or SOB  CV: NEGATIVE for chest pain, palpitations or peripheral edema  GI: abdominal pain RLQ    OBJECTIVE:                                                    /82 (BP Location: Right arm, Patient Position: Chair, Cuff Size: Adult Large)  Pulse 80  Temp 96.7  F (35.9  C) (Oral)  Resp 20  Wt (!) 314 lb 6.4 oz (142.6 kg)  SpO2 100%  BMI 53.97 kg/m2  Body mass index is 53.97 kg/(m^2).         ASSESSMENT/PLAN:                                                    Persistent right abd pain    Michael Palumbo MD, MD  [facility]

## 2017-11-29 NOTE — PROGRESS NOTES
SUBJECTIVE:  Waqas Allen is in for a followup of a recent evaluation in the emergency room where he had presented with a 3-day history of significant right abdominal pain.  He underwent a workup.  White count was only 12,000.  CT scan suggested that he had some inflamed fatty epiploic appendix, but appendicitis was ruled out and no antibiotics were instituted.  He has been using ibuprofen, but it has been bothering his stomach some.  The problem is this was about 3 weeks ago and he continues to have the pain enough to interrupt a night's sleep, which I find a little unusual.  I do repeat a CBC today and it is now normal.  I do not think he has any abscess and I really do not know that there is any other suggestion other than to give this some more time.  We do discuss this.      OBJECTIVE:  On examination, he is slightly tender to palpation on the right mid abdomen.  This has not bothered bowel or bladder in any way.      ASSESSMENT AND PLAN:  I suppose another consideration is it could have been a little bit of infarct to the fatty area, but you would think it would be resolved by now.  If this pain continues at this severity, he may want to consider a surgery consult and see if they think he would be helped with laparoscopic removal of the inflamed area.         KENTON MANSFIELD M.D.             D: 2017 11:29   T: 2017 14:59   MT: MICHAEL      Name:     WAQAS ALLEN   MRN:      [id number]        Account:      [id number]   :      1980           Visit Date:   2017      Document: [id number]

## 2017-11-29 NOTE — NURSING NOTE
"Chief Complaint   Patient presents with     ER F/U       Initial /82 (BP Location: Right arm, Patient Position: Chair, Cuff Size: Adult Large)  Pulse 80  Temp 96.7  F (35.9  C) (Oral)  Resp 20  Wt (!) 314 lb 6.4 oz (142.6 kg)  SpO2 100%  BMI 53.97 kg/m2 Estimated body mass index is 53.97 kg/(m^2) as calculated from the following:    Height as of 5/22/17: 5' 4\" (1.626 m).    Weight as of this encounter: 314 lb 6.4 oz (142.6 kg).  Medication Reconciliation: complete     Loree ANGULO LPN      "

## 2017-11-29 NOTE — MR AVS SNAPSHOT
After Visit Summary   11/29/2017    Waqas Allen    MRN: [id number]           Patient Information     Date Of Birth          1980        Visit Information        Provider Department      11/29/2017 9:15 AM Michael Palumbo MD [facility]        Today's Diagnoses     RLQ abdominal pain    -  1       Follow-ups after your visit        Who to contact     If you have questions or need follow up information about today's clinic visit or your schedule please contact [facility] directly at [phone].  Normal or non-critical lab and imaging results will be communicated to you by Fullscreenhart, letter or phone within 4 business days after the clinic has received the results. If you do not hear from us within 7 days, please contact the clinic through Stalkthist or phone. If you have a critical or abnormal lab result, we will notify you by phone as soon as possible.  Submit refill requests through [employer] or call your pharmacy and they will forward the refill request to us. Please allow 3 business days for your refill to be completed.          Additional Information About Your Visit        MyChart Information     [employer] gives you secure access to your electronic health record. If you see a primary care provider, you can also send messages to your care team and make appointments. If you have questions, please call your primary care clinic.  If you do not have a primary care provider, please call [phone] and they will assist you.        Care EveryWhere ID     This is your Care EveryWhere ID. This could be used by other organizations to access your [place] medical records  [id number]        Your Vitals Were     Pulse Temperature Respirations Pulse Oximetry BMI (Body Mass Index)       80 96.7  F (35.9  C) (Oral) 20 100% 53.97 kg/m2        Blood Pressure from Last 3 Encounters:   11/29/17 130/82   11/06/17 139/90   05/22/17 133/82    Weight from Last 3 Encounters:   11/29/17  (!) 314 lb 6.4 oz (142.6 kg)   11/06/17 (!) 314 lb (142.4 kg)   05/22/17 296 lb 14.4 oz (134.7 kg)              We Performed the Following     CBC with platelets differential        Primary Care Provider Office Phone # Fax #    Michael Palumbo [phone]       [address] [facility] [address]        Equal Access to Services     JAY MARTINES : Hadii aad ku hadasho Soomaali, waaxda luqadaha, qaybta kaalmada adeegyada, waxay idiin hayaan adeeg monika bhagat . So [facility] [phone].    ATENCIÓN: Si edouard pelletier, tiene a lancaster disposición servicios gratuitos de asistencia lingüística. Llame al [phone].    We comply with applicable federal civil rights laws and [place] laws. We do not discriminate on the basis of race, color, national origin, age, disability, sex, sexual orientation, or gender identity.            Thank you!     Thank you for choosing [facility]  for your care. Our goal is always to provide you with excellent care. Hearing back from our patients is one way we can continue to improve our services. Please take a few minutes to complete the written survey that you may receive in the mail after your visit with us. Thank you!             Your Updated Medication List - Protect others around you: Learn how to safely use, store and throw away your medicines at www.disposemymeds.org.          This list is accurate as of: 11/29/17 10:25 AM.  Always use your most recent med list.                   Brand Name Dispense Instructions for use Diagnosis    aspirin 81 MG tablet     100    ONE DAILY    Essential hypertension, benign       blood glucose monitoring lancets     3 Box    1 each 2 times daily    Type 2 diabetes mellitus (H)       blood glucose monitoring test strip    ACCU-CHEK SMARTVIEW    100 each    1 strip by In Vitro route 2 times daily    Type 2 diabetes, HbA1c goal < 7% (H)       glipiZIDE 10 MG tablet    GLUCOTROL    180 tablet    Take 1 tablet (10 mg) by  mouth 2 times daily (before meals)    Type 2 diabetes mellitus without complication, without long-term current use of insulin (H)       losartan 50 MG tablet    COZAAR    90 tablet    TAKE 1 TABLET BY MOUTH EVERY DAY    Hypertension goal BP (blood pressure) < 140/90       minocycline 100 MG capsule    MINOCIN/DYNACIN    60 capsule    TAKE 1 CAPSULE BY MOUTH TWICE A DAY    Acne vulgaris       oxyCODONE-acetaminophen 5-325 MG per tablet    PERCOCET    15 tablet    Take 1-2 tablets by mouth every 4 hours as needed for pain        pioglitazone 15 MG tablet    ACTOS    30 tablet    TAKE 1 TABLET BY MOUTH EVERY DAY    Type 2 diabetes mellitus without complication, without long-term current use of insulin (H)       POTASSIUM GLUCONATE PO      Take 1 tablet by mouth daily        zinc 50 MG Tabs      Take 1 tablet by mouth daily

## 2017-12-17 ENCOUNTER — APPOINTMENT (OUTPATIENT)
Dept: ULTRASOUND IMAGING | Facility: CLINIC | Age: 37
End: 2017-12-17
Attending: PHYSICIAN ASSISTANT
Payer: COMMERCIAL

## 2017-12-17 ENCOUNTER — HOSPITAL ENCOUNTER (EMERGENCY)
Facility: CLINIC | Age: 37
Discharge: HOME | End: 2017-12-18
Attending: PHYSICIAN ASSISTANT | Admitting: PHYSICIAN ASSISTANT
Payer: COMMERCIAL

## 2017-12-17 DIAGNOSIS — R10.9 CHRONIC ABDOMINAL PAIN: ICD-10-CM

## 2017-12-17 DIAGNOSIS — K63.89 EPIPLOIC APPENDAGITIS: ICD-10-CM

## 2017-12-17 DIAGNOSIS — L73.2 HIDRADENITIS SUPPURATIVA: ICD-10-CM

## 2017-12-17 DIAGNOSIS — G89.29 CHRONIC ABDOMINAL PAIN: ICD-10-CM

## 2017-12-17 LAB
ALBUMIN SERPL-MCNC: 3.4 G/DL (ref 3.4–5)
ALBUMIN UR-MCNC: NEGATIVE MG/DL
ALP SERPL-CCNC: 195 U/L (ref 40–150)
ALT SERPL W P-5'-P-CCNC: 30 U/L (ref 0–70)
ANION GAP SERPL CALCULATED.3IONS-SCNC: 7 MMOL/L (ref 3–14)
APPEARANCE UR: CLEAR
AST SERPL W P-5'-P-CCNC: 14 U/L (ref 0–45)
BASOPHILS # BLD AUTO: 0 10E9/L (ref 0–0.2)
BASOPHILS NFR BLD AUTO: 0.2 %
BILIRUB SERPL-MCNC: 0.2 MG/DL (ref 0.2–1.3)
BILIRUB UR QL STRIP: NEGATIVE
BUN SERPL-MCNC: 12 MG/DL (ref 7–30)
CALCIUM SERPL-MCNC: 7.9 MG/DL (ref 8.5–10.1)
CHLORIDE SERPL-SCNC: 104 MMOL/L (ref 94–109)
CO2 SERPL-SCNC: 27 MMOL/L (ref 20–32)
COLOR UR AUTO: YELLOW
CREAT SERPL-MCNC: 0.96 MG/DL (ref 0.66–1.25)
CRP SERPL-MCNC: 4.8 MG/L (ref 0–8)
DIFFERENTIAL METHOD BLD: NORMAL
EOSINOPHIL # BLD AUTO: 0.1 10E9/L (ref 0–0.7)
EOSINOPHIL NFR BLD AUTO: 1.2 %
ERYTHROCYTE [DISTWIDTH] IN BLOOD BY AUTOMATED COUNT: 13.8 % (ref 10–15)
GFR SERPL CREATININE-BSD FRML MDRD: 88 ML/MIN/1.7M2
GLUCOSE SERPL-MCNC: 316 MG/DL (ref 70–99)
GLUCOSE UR STRIP-MCNC: >499 MG/DL
HBA1C MFR BLD: 10.3 % (ref 4.3–6)
HCT VFR BLD AUTO: 47.2 % (ref 40–53)
HGB BLD-MCNC: 15.6 G/DL (ref 13.3–17.7)
HGB UR QL STRIP: ABNORMAL
IMM GRANULOCYTES # BLD: 0.1 10E9/L (ref 0–0.4)
IMM GRANULOCYTES NFR BLD: 0.6 %
KETONES UR STRIP-MCNC: NEGATIVE MG/DL
LEUKOCYTE ESTERASE UR QL STRIP: NEGATIVE
LIPASE SERPL-CCNC: 327 U/L (ref 73–393)
LYMPHOCYTES # BLD AUTO: 3.1 10E9/L (ref 0.8–5.3)
LYMPHOCYTES NFR BLD AUTO: 32.9 %
MCH RBC QN AUTO: 28.5 PG (ref 26.5–33)
MCHC RBC AUTO-ENTMCNC: 33.1 G/DL (ref 31.5–36.5)
MCV RBC AUTO: 86 FL (ref 78–100)
MONOCYTES # BLD AUTO: 0.6 10E9/L (ref 0–1.3)
MONOCYTES NFR BLD AUTO: 6.3 %
MUCOUS THREADS #/AREA URNS LPF: PRESENT /LPF
NEUTROPHILS # BLD AUTO: 5.5 10E9/L (ref 1.6–8.3)
NEUTROPHILS NFR BLD AUTO: 58.8 %
NITRATE UR QL: NEGATIVE
PH UR STRIP: 5 PH (ref 5–7)
PLATELET # BLD AUTO: 247 10E9/L (ref 150–450)
POTASSIUM SERPL-SCNC: 3.8 MMOL/L (ref 3.4–5.3)
PROT SERPL-MCNC: 7.3 G/DL (ref 6.8–8.8)
RBC # BLD AUTO: 5.47 10E12/L (ref 4.4–5.9)
RBC #/AREA URNS AUTO: <1 /HPF (ref 0–2)
SODIUM SERPL-SCNC: 138 MMOL/L (ref 133–144)
SOURCE: ABNORMAL
SP GR UR STRIP: 1.04 (ref 1–1.03)
UROBILINOGEN UR STRIP-MCNC: 0 MG/DL (ref 0–2)
WBC # BLD AUTO: 9.4 10E9/L (ref 4–11)
WBC #/AREA URNS AUTO: ABNORMAL /HPF (ref 0–2)

## 2017-12-17 PROCEDURE — 99285 EMERGENCY DEPT VISIT HI MDM: CPT | Mod: 25 | Performed by: PHYSICIAN ASSISTANT

## 2017-12-17 PROCEDURE — 25000128 H RX IP 250 OP 636: Performed by: PHYSICIAN ASSISTANT

## 2017-12-17 PROCEDURE — 96374 THER/PROPH/DIAG INJ IV PUSH: CPT | Performed by: PHYSICIAN ASSISTANT

## 2017-12-17 PROCEDURE — 76705 ECHO EXAM OF ABDOMEN: CPT

## 2017-12-17 PROCEDURE — 99285 EMERGENCY DEPT VISIT HI MDM: CPT | Mod: Z6 | Performed by: PHYSICIAN ASSISTANT

## 2017-12-17 PROCEDURE — 81001 URINALYSIS AUTO W/SCOPE: CPT | Performed by: PHYSICIAN ASSISTANT

## 2017-12-17 PROCEDURE — 83690 ASSAY OF LIPASE: CPT | Performed by: PHYSICIAN ASSISTANT

## 2017-12-17 PROCEDURE — 86140 C-REACTIVE PROTEIN: CPT | Performed by: PHYSICIAN ASSISTANT

## 2017-12-17 PROCEDURE — 85025 COMPLETE CBC W/AUTO DIFF WBC: CPT | Performed by: PHYSICIAN ASSISTANT

## 2017-12-17 PROCEDURE — 83036 HEMOGLOBIN GLYCOSYLATED A1C: CPT | Performed by: PHYSICIAN ASSISTANT

## 2017-12-17 PROCEDURE — 80053 COMPREHEN METABOLIC PANEL: CPT | Performed by: PHYSICIAN ASSISTANT

## 2017-12-17 RX ORDER — HYDROMORPHONE HYDROCHLORIDE 1 MG/ML
0.5 INJECTION, SOLUTION INTRAMUSCULAR; INTRAVENOUS; SUBCUTANEOUS
Status: DISCONTINUED | OUTPATIENT
Start: 2017-12-17 | End: 2017-12-18 | Stop reason: HOSPADM

## 2017-12-17 RX ADMIN — HYDROMORPHONE HYDROCHLORIDE 0.5 MG: 1 INJECTION, SOLUTION INTRAMUSCULAR; INTRAVENOUS; SUBCUTANEOUS at 21:51

## 2017-12-17 ASSESSMENT — ENCOUNTER SYMPTOMS
DIARRHEA: 0
FREQUENCY: 0
CHILLS: 0
FEVER: 0
HEMATURIA: 0
ABDOMINAL PAIN: 1
BLOOD IN STOOL: 0
NAUSEA: 0
DYSURIA: 0
VOMITING: 0
CONSTIPATION: 0

## 2017-12-17 NOTE — ED AVS SNAPSHOT
[facility] Emergency Department    [address] [place] [address]    Phone:  [phone]    Fax:  [phone]                                       Waqas Allen   MRN: [id number]    Department:  [facility] Emergency Department   Date of Visit:  12/17/2017           Patient Information     Date Of Birth          1980        Your diagnoses for this visit were:     Chronic abdominal pain     Epiploic appendagitis     Hidradenitis suppurativa        You were seen by Tunde Ji PA-C.      Follow-up Information     Follow up with [facility] Emergency Department.    Specialty:  EMERGENCY MEDICINE    Why:  As needed, If symptoms worsen    Contact information:    [address] [facility]   [place] [address] [phone]    Additional information:    From [address]: Exit at [address] on south side of [place]. Turn right on [address]. Turn left at stoplight on [address]. [facility] will be in view two blocks ahead        Discharge Instructions       1.  Please start taking your AM meds on a regular basis to help control your diabetes.  Please schedule a follow up diabetes visit with your primary care provider to figure out a way to better control your diabetes.   2.  Please try taking the Doxycycline for your Hidradenitis Suppurativa.    3.  It is okay to take Oxycodone to be able to sleep with your abdominal discomfort.   4.  Please keep your appointment with the General Surgeon this week to recheck your abdominal pain.  Return to the ED prior to then if your pain worsens or is associated with a fever above 101.      It was a pleasure meeting you and working with you today!      Future Appointments        Provider Department Dept Phone Center    12/22/2017 11:30 AM Manas Teresa, DO [facility] [phone] Merged with [facility]      24 Hour Appointment Hotline       To make an appointment at any [facility], call  [phone] ([phone]). If you don't have a family doctor or clinic, we will help you find one. [place] clinics are conveniently located to serve the needs of you and your family.          ED Discharge Orders     GENERAL SURG ADULT REFERRAL       Your provider has referred you to: [facility]: [facility] [phone]   [email or web address]    Please be aware that coverage of these services is subject to the terms and limitations of your health insurance plan.  Call member services at your health plan with any benefit or coverage questions.      Please bring the following with you to your appointment:    (1) Any X-Rays, CTs or MRIs which have been performed.  Contact the facility where they were done to arrange for  prior to your scheduled appointment.   (2) List of current medications   (3) This referral request   (4) Any documents/labs given to you for this referral                     Review of your medicines      START taking        Dose / Directions Last dose taken    doxycycline 100 MG capsule   Commonly known as:  VIBRAMYCIN   Dose:  100 mg   Quantity:  28 capsule        Take 1 capsule (100 mg) by mouth 2 times daily for 14 days   Refills:  0        oxyCODONE IR 5 MG tablet   Commonly known as:  ROXICODONE   Dose:  5 mg   Quantity:  10 tablet        Take 1 tablet (5 mg) by mouth every 6 hours as needed for pain   Refills:  0          Our records show that you are taking the medicines listed below. If these are incorrect, please call your family doctor or clinic.        Dose / Directions Last dose taken    aspirin 81 MG tablet   Quantity:  100        ONE DAILY   Refills:  3        blood glucose monitoring lancets   Dose:  1 each   Quantity:  3 Box        1 each 2 times daily   Refills:  2        blood glucose monitoring test strip   Commonly known as:  ACCU-CHEK SMARTVIEW   Dose:  1 strip   Quantity:  100  each        1 strip by In Vitro route 2 times daily   Refills:  11        glipiZIDE 10 MG tablet   Commonly known as:  GLUCOTROL   Dose:  10 mg   Quantity:  180 tablet        Take 1 tablet (10 mg) by mouth 2 times daily (before meals)   Refills:  3        losartan 50 MG tablet   Commonly known as:  COZAAR   Quantity:  90 tablet        TAKE 1 TABLET BY MOUTH EVERY DAY   Refills:  1        minocycline 100 MG capsule   Commonly known as:  MINOCIN/DYNACIN   Quantity:  60 capsule        TAKE 1 CAPSULE BY MOUTH TWICE A DAY   Refills:  5        oxyCODONE-acetaminophen 5-325 MG per tablet   Commonly known as:  PERCOCET   Dose:  1-2 tablet   Quantity:  15 tablet        Take 1-2 tablets by mouth every 4 hours as needed for pain   Refills:  0        pioglitazone 15 MG tablet   Commonly known as:  ACTOS   Quantity:  30 tablet        TAKE 1 TABLET BY MOUTH EVERY DAY   Refills:  3        POTASSIUM GLUCONATE PO   Dose:  1 tablet        Take 1 tablet by mouth daily   Refills:  0        zinc 50 MG Tabs   Dose:  1 tablet        Take 1 tablet by mouth daily   Refills:  0                Prescriptions were sent or printed at these locations (2 Prescriptions)                   [facility]   [address] [address]    Telephone:  [phone]   Fax:  [phone]   Hours:                  Printed at Department/Unit printer (1 of 2)         oxyCODONE IR (ROXICODONE) 5 MG tablet                 These medications are not ready yet because we are checking if your insurance will help you pay for them. Call your pharmacy to confirm that your medication is ready for pickup. It may take up to 24 hours for them to receive the prescription. If the prescription is not ready within 3 business days, please contact your clinic or your provider (1 of 2)         doxycycline (VIBRAMYCIN) 100 MG capsule                Procedures and tests performed during your visit     CBC with platelets differential    CRP  inflammation    Comprehensive metabolic panel    Hemoglobin A1c    Lipase    Peripheral IV catheter    UA reflex to Microscopic and Culture    US Abdomen Limited      Orders Needing Specimen Collection     None      Pending Results     No orders found for last 3 day(s).            Pending Culture Results     No orders found for last 3 day(s).            Pending Results Instructions     If you had any lab results that were not finalized at the time of your Discharge, you can call the ED Lab Result RN at [phone]. You will be contacted by this team for any positive Lab results or changes in treatment. The nurses are available 7 days a week from 10A to 6:30P.  You can leave a message 24 hours per day and they will return your call.        Thank you for choosing [facility]       Thank you for choosing [facility] for your care. Our goal is always to provide you with excellent care. Hearing back from our patients is one way we can continue to improve our services. Please take a few minutes to complete the written survey that you may receive in the mail after you visit with us. Thank you!        [employer] Information     [employer] gives you secure access to your electronic health record. If you see a primary care provider, you can also send messages to your care team and make appointments. If you have questions, please call your primary care clinic.  If you do not have a primary care provider, please call [phone] and they will assist you.        Care EveryWhere ID     This is your Care EveryWhere ID. This could be used by other organizations to access your [facility] medical records  [id number]        Equal Access to Services     JAY MARTINES : Brianda Mcnally, swapnil [place], qaybta luly [place]. So [facility] [phone].    ATENCIÓN: Si habla español, tiene a [place]ancaster disposición servicios gratuitos de asistencia lingüística. Llame al [phone].    We comply with  applicable federal civil rights laws and [place] laws. We do not discriminate on the basis of race, color, national origin, age, disability, sex, sexual orientation, or gender identity.            After Visit Summary       This is your record. Keep this with you and show to your community pharmacist(s) and doctor(s) at your next visit.

## 2017-12-17 NOTE — ED AVS SNAPSHOT
[facility] Emergency Department    [address] [place] [address]    Phone:  [phone]    Fax:  [phone]                                       Waqas Allen   MRN: [id number]    Department:  [facility] Emergency Department   Date of Visit:  12/17/2017           After Visit Summary Signature Page     I have received my discharge instructions, and my questions have been answered. I have discussed any challenges I see with this plan with the nurse or doctor.    ..........................................................................................................................................  Patient/Patient Representative Signature      ..........................................................................................................................................  Patient Representative Print Name and Relationship to Patient    ..................................................               ................................................  Date                                            Time    ..........................................................................................................................................  Reviewed by Signature/Title    ...................................................              ..............................................  Date                                                            Time

## 2017-12-18 VITALS
RESPIRATION RATE: 20 BRPM | HEART RATE: 99 BPM | SYSTOLIC BLOOD PRESSURE: 160 MMHG | TEMPERATURE: 98 F | OXYGEN SATURATION: 98 % | BODY MASS INDEX: 51.49 KG/M2 | DIASTOLIC BLOOD PRESSURE: 98 MMHG | WEIGHT: 300 LBS

## 2017-12-18 RX ORDER — OXYCODONE HYDROCHLORIDE 5 MG/1
5 TABLET ORAL EVERY 6 HOURS PRN
Qty: 10 TABLET | Refills: 0 | Status: SHIPPED | OUTPATIENT
Start: 2017-12-18 | End: 2018-03-16

## 2017-12-18 RX ORDER — DOXYCYCLINE 100 MG/1
100 CAPSULE ORAL 2 TIMES DAILY
Qty: 28 CAPSULE | Refills: 0 | Status: SHIPPED | OUTPATIENT
Start: 2017-12-18 | End: 2018-01-01

## 2017-12-18 NOTE — ED PROVIDER NOTES
History     Chief Complaint   Patient presents with     Abdominal Pain     The history is provided by the patient.     Waqas Allen is a 37 year old male with a past medical history of Hypertension and Type II Diabetes who presents to the ED for evaluation of abdominal pain. Patient was seen in the ED in November The pain never fully went away and rates his pain a 2-4/10 each day. The pain is located above his umbilical region and radiates across the entire abdomen. He had a gradually worsening pain today after shopping. He rates his pain a 8-9/10. His wife states that he was curled over. The pain increases with deep breaths and movement. Eating does not affect the pain. Typically has 2-3 bowel movements daily. Took Ibuprofen today with mild relief. Denies blood in stool, hematuria, dysuria, frequency, urgency, fever, chills, vomiting or any other associated symptoms.      Problem List:    Patient Active Problem List    Diagnosis Date Noted     AC joint arthropathy 01/06/2017     Priority: Medium     Type 2 diabetes mellitus without complication, without long-term current use of insulin (H) 12/15/2015     Priority: Medium     Sprain of acromioclavicular ligament 11/25/2014     Priority: Medium     Problem list name updated by automated process. Provider to review       Hypertension goal BP (blood pressure) < 140/90 10/02/2011     Priority: Medium     Acne 06/09/2010     Priority: Medium        Past Medical History:    Past Medical History:   Diagnosis Date     Unspecified essential hypertension      Variants of migraine, not elsewhere classified, without mention of intractable migraine without mention of status migrainosus      Varicella without mention of complication 1993       Past Surgical History:    Past Surgical History:   Procedure Laterality Date     ARTHROSCOPY SHOULDER DECOMPRESSION Left 1/20/2016    Procedure: ARTHROSCOPY SHOULDER DECOMPRESSION;  Surgeon: Watson Duckworth MD;  Location:  OR      ARTHROSCOPY SHOULDER DECOMPRESSION Right 2/1/2017    Procedure: ARTHROSCOPY SHOULDER DECOMPRESSION;  Surgeon: Watson Duckworth MD;  Location: [facility] OR     ARTHROSCOPY SHOULDER DISTAL CLAVICLE REPAIR Left 1/20/2016    Procedure: ARTHROSCOPY SHOULDER DISTAL CLAVICLE RESECTION;  Surgeon: Watson Duckworth MD;  Location: [facility] OR     ARTHROSCOPY SHOULDER DISTAL CLAVICLE REPAIR Right 2/1/2017    Procedure: ARTHROSCOPY SHOULDER DISTAL CLAVICLE RESECTION;  Surgeon: Watson Duckworth MD;  Location: [facility] OR     LAMINECT/DISCECTOMY, LUMBAR  04/24/2007    Left L3-L4 Hemilaminectomy/Microdiskectomy.  [facility]       Family History:    Family History   Problem Relation Age of Onset     Hypertension Brother      CEREBROVASCULAR DISEASE Maternal Grandfather      Unknown/Adopted Father        Social History:  Marital Status:  Single [1]  Social History   Substance Use Topics     Smoking status: Never Smoker     Smokeless tobacco: Never Used     Alcohol use 0.0 oz/week     0 Standard drinks or equivalent per week      Comment: occ        Medications:      doxycycline (VIBRAMYCIN) 100 MG capsule   oxyCODONE IR (ROXICODONE) 5 MG tablet   pioglitazone (ACTOS) 15 MG tablet   minocycline (MINOCIN/DYNACIN) 100 MG capsule   losartan (COZAAR) 50 MG tablet   glipiZIDE (GLUCOTROL) 10 MG tablet   zinc 50 MG TABS   POTASSIUM GLUCONATE PO   ASPIRIN 81 MG OR TABS   oxyCODONE-acetaminophen (PERCOCET) 5-325 MG per tablet   blood glucose monitoring (SOFTCLIX) lancets   blood glucose monitoring (ACCU-CHEK SMARTVIEW) test strip         Review of Systems   Constitutional: Negative for chills and fever.   Gastrointestinal: Positive for abdominal pain. Negative for blood in stool, constipation, diarrhea, nausea and vomiting.   Genitourinary: Negative for dysuria, frequency, hematuria and urgency.   All other systems reviewed and are negative.      Physical Exam   BP: (!) 195/102  Heart Rate: 102  Temp: 98.2  F (36.8  C)  Resp: 18  Weight: 136.1  kg (300 lb)  SpO2: 99 %      Physical Exam   Constitutional: He is oriented to person, place, and time. He appears well-developed and well-nourished. No distress.   HENT:   Head: Normocephalic and atraumatic.   Right Ear: External ear normal.   Left Ear: External ear normal.   Nose: Nose normal.   Mouth/Throat: Oropharynx is clear and moist. No oropharyngeal exudate.   Eyes: Conjunctivae and EOM are normal. Pupils are equal, round, and reactive to light. Right eye exhibits no discharge. Left eye exhibits no discharge. No scleral icterus.   Neck: Normal range of motion. Neck supple. No thyromegaly present.   Cardiovascular: Normal rate, regular rhythm, normal heart sounds and intact distal pulses.  Exam reveals no gallop.    No murmur heard.  Pulmonary/Chest: Effort normal. No respiratory distress. He has no decreased breath sounds. He has no wheezes. He has no rhonchi. He has no rales.   Abdominal: Soft. Bowel sounds are normal. He exhibits no distension, no fluid wave, no abdominal bruit, no ascites, no pulsatile midline mass and no mass. There is no hepatosplenomegaly. There is tenderness (RUQ>LUQ). There is no rebound, no guarding and no CVA tenderness. No hernia. Hernia confirmed negative in the ventral area.   Lymphadenopathy:     He has no cervical adenopathy.   Neurological: He is alert and oriented to person, place, and time.   Skin: Skin is warm and dry. Rash (Painful nodular lesions in the bilateral medial thigh area and axilla consistent with hidradenitis suppurativa) noted. No erythema. No pallor.   Psychiatric: He has a normal mood and affect. His behavior is normal.   Nursing note and vitals reviewed.      ED Course     ED Course     Procedures               Critical Care time:  none        Results for orders placed or performed during the hospital encounter of 12/17/17   US Abdomen Limited    Narrative    LIMITED ABDOMINAL ULTRASOUND  12/17/2017 10:48 PM     HISTORY:  Right upper quadrant  pain.    COMPARISON: CT abdomen and pelvis dated 11/6/2017.    FINDINGS:    Gallbladder: Normal with no cholelithiasis, wall thickening or focal  tenderness.      Bile ducts:  CHD is normal diameter. No intrahepatic biliary  dilatation.    Liver: There is diffuse hyperechogenicity of the liver most consistent  with diffuse fatty infiltration. No focal intrahepatic lesion is  identified.    Pancreas:  Partially obscured but grossly unremarkable, where seen.     Right kidney:  Limited in evaluation due to difficulty visualization.  Right kidney is unremarkable as seen.    Aorta and Proximal IVC: Aorta is partially obscured. Visualized  portions of the abdominal aorta are within normal limits. Inferior  vena cava is not evaluated on this study.      Impression    IMPRESSION:   1. Diffuse fatty infiltration of the liver causing limitation in this  evaluation (decreased penetration).  2. No evidence for cholelithiasis or acute cholecystitis.    FRANTZ LAWS MD   CBC with platelets differential   Result Value Ref Range    WBC 9.4 4.0 - 11.0 10e9/L    RBC Count 5.47 4.4 - 5.9 10e12/L    Hemoglobin 15.6 13.3 - 17.7 g/dL    Hematocrit 47.2 40.0 - 53.0 %    MCV 86 78 - 100 fl    MCH 28.5 26.5 - 33.0 pg    MCHC 33.1 31.5 - 36.5 g/dL    RDW 13.8 10.0 - 15.0 %    Platelet Count 247 150 - 450 10e9/L    Diff Method Automated Method     % Neutrophils 58.8 %    % Lymphocytes 32.9 %    % Monocytes 6.3 %    % Eosinophils 1.2 %    % Basophils 0.2 %    % Immature Granulocytes 0.6 %    Absolute Neutrophil 5.5 1.6 - 8.3 10e9/L    Absolute Lymphocytes 3.1 0.8 - 5.3 10e9/L    Absolute Monocytes 0.6 0.0 - 1.3 10e9/L    Absolute Eosinophils 0.1 0.0 - 0.7 10e9/L    Absolute Basophils 0.0 0.0 - 0.2 10e9/L    Abs Immature Granulocytes 0.1 0 - 0.4 10e9/L   Comprehensive metabolic panel   Result Value Ref Range    Sodium 138 133 - 144 mmol/L    Potassium 3.8 3.4 - 5.3 mmol/L    Chloride 104 94 - 109 mmol/L    Carbon Dioxide 27 20 - 32 mmol/L     Anion Gap 7 3 - 14 mmol/L    Glucose 316 (H) 70 - 99 mg/dL    Urea Nitrogen 12 7 - 30 mg/dL    Creatinine 0.96 0.66 - 1.25 mg/dL    GFR Estimate 88 >60 mL/min/1.7m2    GFR Estimate If Black >90 >60 mL/min/1.7m2    Calcium 7.9 (L) 8.5 - 10.1 mg/dL    Bilirubin Total 0.2 0.2 - 1.3 mg/dL    Albumin 3.4 3.4 - 5.0 g/dL    Protein Total 7.3 6.8 - 8.8 g/dL    Alkaline Phosphatase 195 (H) 40 - 150 U/L    ALT 30 0 - 70 U/L    AST 14 0 - 45 U/L   Lipase   Result Value Ref Range    Lipase 327 73 - 393 U/L   CRP inflammation   Result Value Ref Range    CRP Inflammation 4.8 0.0 - 8.0 mg/L   UA reflex to Microscopic and Culture   Result Value Ref Range    Color Urine Yellow     Appearance Urine Clear     Glucose Urine >499 (A) NEG^Negative mg/dL    Bilirubin Urine Negative NEG^Negative    Ketones Urine Negative NEG^Negative mg/dL    Specific Gravity Urine 1.036 (H) 1.003 - 1.035    Blood Urine Small (A) NEG^Negative    pH Urine 5.0 5.0 - 7.0 pH    Protein Albumin Urine Negative NEG^Negative mg/dL    Urobilinogen mg/dL 0.0 0.0 - 2.0 mg/dL    Nitrite Urine Negative NEG^Negative    Leukocyte Esterase Urine Negative NEG^Negative    Source Unspecified Urine     RBC Urine <1 0 - 2 /HPF    WBC Urine None 0 - 2 /HPF    Mucous Urine Present (A) NEG^Negative /LPF   Hemoglobin A1c   Result Value Ref Range    Hemoglobin A1C 10.3 (H) 4.3 - 6.0 %           Labs Ordered and Resulted from Time of ED Arrival Up to the Time of Departure from the ED   COMPREHENSIVE METABOLIC PANEL - Abnormal; Notable for the following:        Result Value    Glucose 316 (*)     Calcium 7.9 (*)     Alkaline Phosphatase 195 (*)     All other components within normal limits   UA MACROSCOPIC WITH REFLEX TO MICRO AND CULTURE - Abnormal; Notable for the following:     Glucose Urine >499 (*)     Specific Gravity Urine 1.036 (*)     Blood Urine Small (*)     Mucous Urine Present (*)     All other components within normal limits   HEMOGLOBIN A1C - Abnormal; Notable for the  following:     Hemoglobin A1C 10.3 (*)     All other components within normal limits   CBC WITH PLATELETS DIFFERENTIAL   LIPASE   CRP INFLAMMATION   PERIPHERAL IV CATHETER        Results for orders placed or performed during the hospital encounter of 12/17/17 (from the past 24 hour(s))   CBC with platelets differential   Result Value Ref Range    WBC 9.4 4.0 - 11.0 10e9/L    RBC Count 5.47 4.4 - 5.9 10e12/L    Hemoglobin 15.6 13.3 - 17.7 g/dL    Hematocrit 47.2 40.0 - 53.0 %    MCV 86 78 - 100 fl    MCH 28.5 26.5 - 33.0 pg    MCHC 33.1 31.5 - 36.5 g/dL    RDW 13.8 10.0 - 15.0 %    Platelet Count 247 150 - 450 10e9/L    Diff Method Automated Method     % Neutrophils 58.8 %    % Lymphocytes 32.9 %    % Monocytes 6.3 %    % Eosinophils 1.2 %    % Basophils 0.2 %    % Immature Granulocytes 0.6 %    Absolute Neutrophil 5.5 1.6 - 8.3 10e9/L    Absolute Lymphocytes 3.1 0.8 - 5.3 10e9/L    Absolute Monocytes 0.6 0.0 - 1.3 10e9/L    Absolute Eosinophils 0.1 0.0 - 0.7 10e9/L    Absolute Basophils 0.0 0.0 - 0.2 10e9/L    Abs Immature Granulocytes 0.1 0 - 0.4 10e9/L   Comprehensive metabolic panel   Result Value Ref Range    Sodium 138 133 - 144 mmol/L    Potassium 3.8 3.4 - 5.3 mmol/L    Chloride 104 94 - 109 mmol/L    Carbon Dioxide 27 20 - 32 mmol/L    Anion Gap 7 3 - 14 mmol/L    Glucose 316 (H) 70 - 99 mg/dL    Urea Nitrogen 12 7 - 30 mg/dL    Creatinine 0.96 0.66 - 1.25 mg/dL    GFR Estimate 88 >60 mL/min/1.7m2    GFR Estimate If Black >90 >60 mL/min/1.7m2    Calcium 7.9 (L) 8.5 - 10.1 mg/dL    Bilirubin Total 0.2 0.2 - 1.3 mg/dL    Albumin 3.4 3.4 - 5.0 g/dL    Protein Total 7.3 6.8 - 8.8 g/dL    Alkaline Phosphatase 195 (H) 40 - 150 U/L    ALT 30 0 - 70 U/L    AST 14 0 - 45 U/L   Lipase   Result Value Ref Range    Lipase 327 73 - 393 U/L   CRP inflammation   Result Value Ref Range    CRP Inflammation 4.8 0.0 - 8.0 mg/L   Hemoglobin A1c   Result Value Ref Range    Hemoglobin A1C 10.3 (H) 4.3 - 6.0 %   UA reflex to  Microscopic and Culture   Result Value Ref Range    Color Urine Yellow     Appearance Urine Clear     Glucose Urine >499 (A) NEG^Negative mg/dL    Bilirubin Urine Negative NEG^Negative    Ketones Urine Negative NEG^Negative mg/dL    Specific Gravity Urine 1.036 (H) 1.003 - 1.035    Blood Urine Small (A) NEG^Negative    pH Urine 5.0 5.0 - 7.0 pH    Protein Albumin Urine Negative NEG^Negative mg/dL    Urobilinogen mg/dL 0.0 0.0 - 2.0 mg/dL    Nitrite Urine Negative NEG^Negative    Leukocyte Esterase Urine Negative NEG^Negative    Source Unspecified Urine     RBC Urine <1 0 - 2 /HPF    WBC Urine None 0 - 2 /HPF    Mucous Urine Present (A) NEG^Negative /LPF   US Abdomen Limited    Narrative    LIMITED ABDOMINAL ULTRASOUND  12/17/2017 10:48 PM     HISTORY:  Right upper quadrant pain.    COMPARISON: CT abdomen and pelvis dated 11/6/2017.    FINDINGS:    Gallbladder: Normal with no cholelithiasis, wall thickening or focal  tenderness.      Bile ducts:  CHD is normal diameter. No intrahepatic biliary  dilatation.    Liver: There is diffuse hyperechogenicity of the liver most consistent  with diffuse fatty infiltration. No focal intrahepatic lesion is  identified.    Pancreas:  Partially obscured but grossly unremarkable, where seen.     Right kidney:  Limited in evaluation due to difficulty visualization.  Right kidney is unremarkable as seen.    Aorta and Proximal IVC: Aorta is partially obscured. Visualized  portions of the abdominal aorta are within normal limits. Inferior  vena cava is not evaluated on this study.      Impression    IMPRESSION:   1. Diffuse fatty infiltration of the liver causing limitation in this  evaluation (decreased penetration).  2. No evidence for cholelithiasis or acute cholecystitis.    FRANTZ LAWS MD       Medications   HYDROmorphone (PF) (DILAUDID) injection 0.5 mg (0.5 mg Intravenous Given 12/17/17 1252)         Assessments & Plan (with Medical Decision Making)     Chronic abdominal  pain  Epiploic appendagitis  Hidradenitis suppurativa     37 year old male with uncontrolled type II DM who presents for evaluation of ongoing abdominal pain for the past 6 weeks previously evaluated in the ED on 11/6/17. Underwent laboratory testing and CT evaluation with the findings of epiploic appendicitis. Treated with Percocet as needed for pain without improvement. States he has had daily pain rated 4-5 on a scale of 10. In the past 2 days his symptoms have increased and are now rated 8 on a scale of 10 worse with movement or pressure over the abdomen. Denies any urinary or bowel symptoms. Denies fevers or chills. No trauma to the abdomen. No nausea or vomiting. Food intake does not exacerbate his symptoms. On exam his blood pressure is 166/104, pulse 102, temperature 98.2, and oxygen saturation of 98% on room air. Patient is morbidly obese. Abdomen with tenderness in the right upper quadrant greater than the left upper quadrant. No rebound or guarding. Mildly positive Bloom sign. No hernia palpated. Remainder of exam is otherwise normal. Abdominal ultrasound displays fatty liver but no cholecystitis or cholelithiasis. Laboratory levels with a normal white blood cell count of 9400, normal hemoglobin, normal CRP, and normal CMP other than a significant elevation in his glucose to 316 and a mild elevation of his alkaline phosphatase to 195. Hemoglobin A1c was checked given his significant hyperglycemia, and it was up to 10.3%. Patient does report that he has been eating poorly recently. On further discussion, it sounds like he is not taking his morning diabetic medication as well. We discussed the importance of compliance with his diet, exercise, and medication regimen for control of his diabetes. He will schedule visit with his PCP regarding this. Urinalysis showed glucose urea and normal microscopic exam.   I spoke with general surgery, Dr. Teresa, this evening and he agreed regarding my plan not to  repeat the patient's CT scan at this time. I was concerned about exposing him to radiation given that his abdominal pain symptoms have not changed since his original CT on 11/6/17. He has had daily pain since then, which is just worse in the evening hours. There is no change in the nature of his symptoms. He is afebrile at this time and does not have an acute abdominal exam. Normal white blood cell count, and normal CRP. Dr. Teresa agreed that it is unlikely that the patient has diverticulitis, appendicitis, or other infectious etiology for her symptoms currently.   I discussed my reasoning with the patient and his significant other as well. They both agreed that CT of the abdomen/pelvis was not necessary currently. He agreed to return if the symptoms worsened or if he developed a fever. Patient was set up with a general surgery consultation for this week to discuss his chronic abdominal pain and possibility for laparoscopic evaluation.   I did give him #10 tablets of oxycodone to use as needed for breakthrough pain, primarily so he can sleep. Patient did have significant relief of his symptoms today in the ED using IV Dilaudid.   At the end of his visit, the patient brought up chronic recurrent skin lesions in the bilateral medial thighs and bilateral axilla. These lesions are consistent with hidradenitis suppurativa we discussed a trial of doxycycline 100 mg twice daily for 2 weeks. We discussed the importance of personal hygiene, weight loss, and diabetes control to further help his condition. The patient was in agreement with this plan and was suitable for discharge.       I have reviewed the nursing notes.    I have reviewed the findings, diagnosis, plan and need for follow up with the patient.       New Prescriptions    DOXYCYCLINE (VIBRAMYCIN) 100 MG CAPSULE    Take 1 capsule (100 mg) by mouth 2 times daily for 14 days    OXYCODONE IR (ROXICODONE) 5 MG TABLET    Take 1 tablet (5 mg) by mouth every 6 hours  as needed for pain       Final diagnoses:   Chronic abdominal pain   Epiploic appendagitis   Hidradenitis suppurativa     This document serves as a record of services personally performed by Tunde Ji PA. It was created on their behalf by Emily Moore, a trained medical scribe. The creation of this record is based on the provider's personal observations and the statements of the patient. This document has been checked and approved by the attending provider.    Note: Chart documentation done in part with Dragon Voice Recognition software. Although reviewed after completion, some word and grammatical errors may remain.      12/17/2017   Tunde Ji PA-C   [facility] EMERGENCY DEPARTMENT     Tunde Ji PA-C  12/18/17 0038

## 2017-12-18 NOTE — ED NOTES
Was here in November with abdominal pain, states the pain did get a little bit better, but it came back more severe today.

## 2017-12-18 NOTE — DISCHARGE INSTRUCTIONS
1.  Please start taking your AM meds on a regular basis to help control your diabetes.  Please schedule a follow up diabetes visit with your primary care provider to figure out a way to better control your diabetes.   2.  Please try taking the Doxycycline for your Hidradenitis Suppurativa.    3.  It is okay to take Oxycodone to be able to sleep with your abdominal discomfort.   4.  Please keep your appointment with the General Surgeon this week to recheck your abdominal pain.  Return to the ED prior to then if your pain worsens or is associated with a fever above 101.      It was a pleasure meeting you and working with you today!

## 2017-12-21 DIAGNOSIS — E11.9 TYPE 2 DIABETES MELLITUS WITHOUT COMPLICATION, WITHOUT LONG-TERM CURRENT USE OF INSULIN (H): ICD-10-CM

## 2017-12-21 DIAGNOSIS — L70.0 ACNE VULGARIS: ICD-10-CM

## 2017-12-21 DIAGNOSIS — I10 HYPERTENSION GOAL BP (BLOOD PRESSURE) < 140/90: ICD-10-CM

## 2017-12-21 RX ORDER — MINOCYCLINE HYDROCHLORIDE 100 MG/1
CAPSULE ORAL
Qty: 60 CAPSULE | Refills: 1 | Status: SHIPPED | OUTPATIENT
Start: 2017-12-21 | End: 2018-02-05

## 2017-12-21 RX ORDER — PIOGLITAZONEHYDROCHLORIDE 15 MG/1
TABLET ORAL
Qty: 30 TABLET | Refills: 1 | Status: SHIPPED | OUTPATIENT
Start: 2017-12-21 | End: 2018-02-05

## 2017-12-21 RX ORDER — LOSARTAN POTASSIUM 50 MG/1
TABLET ORAL
Qty: 90 TABLET | Refills: 0 | Status: SHIPPED | OUTPATIENT
Start: 2017-12-21 | End: 2018-02-05

## 2017-12-21 NOTE — TELEPHONE ENCOUNTER
Routing refill request to provider for review/approval because:  Labs out of range:  BP    Mary Marroquin, RN  [facility]

## 2017-12-21 NOTE — TELEPHONE ENCOUNTER
Routing refill request to provider for review/approval because:  Drug interaction warning    Mary Marroquin, RN  [facility]

## 2017-12-21 NOTE — TELEPHONE ENCOUNTER
Requested Prescriptions   Pending Prescriptions Disp Refills     pioglitazone (ACTOS) 15 MG tablet [Pharmacy Med Name: PIOGLITAZONE 15MG TAB] 30 tablet      Sig: TAKE 1 TABLET BY MOUTH EVERY DAY    Thiazolidinedione Agents (TZD's)  Failed    12/21/2017 10:28 AM       Failed - Patient's BP is less than 140/90    BP Readings from Last 3 Encounters:   12/18/17 (!) 160/98   11/29/17 130/82   11/06/17 139/90                Passed - Patient has documented LDL within the past 12 mos.    Recent Labs   Lab Test  01/09/17   1412   LDL  58            Passed - Patient has a normal ALT within the past 12 mos.    Recent Labs   Lab Test  12/17/17   2145   ALT  30            Passed - Patient has a normal AST within the past 12 mos.     Recent Labs   Lab Test  12/17/17   2145   AST  14            Passed - Patient has had a Microalbumin in the past 12 mos.    Recent Labs   Lab Test  05/22/17   1500   MICROL  58   UMALCR  36.77*            Passed - Patient has documented A1c within the specified period of time.    Recent Labs   Lab Test  12/17/17 2145   A1C  10.3*            Passed - Diagnosis not CHF       Passed - Patient is age 18 or older       Passed - Patient has a normal serum Creatinine in the past 12 months    Recent Labs   Lab Test  12/17/17 2145   CR  0.96            Passed - Patient has had an appointment with authorizing provider within the past 6 mos.or within the next 30 days.    Patient had office visit in the last 6 months or has a visit in the next 30 days with authorizing provider.  See chart review.

## 2017-12-21 NOTE — TELEPHONE ENCOUNTER
losartan (COZAAR) 50 MG tablet      Last Written Prescription Date: 6-14-17  Last Fill Quantity: 90, # refills: 1  Last Office Visit with G, P or [facility] prescribing provider: 11/29/17       Potassium   Date Value Ref Range Status   12/17/2017 3.8 3.4 - 5.3 mmol/L Final     Creatinine   Date Value Ref Range Status   12/17/2017 0.96 0.66 - 1.25 mg/dL Final     BP Readings from Last 3 Encounters:   12/18/17 (!) 160/98   11/29/17 130/82   11/06/17 139/90

## 2017-12-21 NOTE — TELEPHONE ENCOUNTER
pioglitazone (ACTOS) 15 MG tablet         Last Written Prescription Date: 8/21/17  Last Fill Quantity: 30, # refills: 3  Last Office Visit with G, P or [facility] prescribing provider:  11/29/17        BP Readings from Last 3 Encounters:   12/18/17 (!) 160/98   11/29/17 130/82   11/06/17 139/90     Lab Results   Component Value Date    MICROL 58 05/22/2017     Lab Results   Component Value Date    UMALCR 36.77 05/22/2017     Creatinine   Date Value Ref Range Status   12/17/2017 0.96 0.66 - 1.25 mg/dL Final   ]  GFR Estimate   Date Value Ref Range Status   12/17/2017 88 >60 mL/min/1.7m2 Final     Comment:     Non  GFR Calc   11/06/2017 85 >60 mL/min/1.7m2 Final     Comment:     Non  GFR Calc   01/09/2017 >90  Non  GFR Calc   >60 mL/min/1.7m2 Final     GFR Estimate If Black   Date Value Ref Range Status   12/17/2017 >90 >60 mL/min/1.7m2 Final     Comment:      GFR Calc   11/06/2017 >90 >60 mL/min/1.7m2 Final     Comment:      GFR Calc   01/09/2017 >90   GFR Calc   >60 mL/min/1.7m2 Final     No results found for: CHOL  No results found for: HDL  Lab Results   Component Value Date    LDL 58 01/09/2017     No results found for: TRIG  No results found for: CHOLHDLRATIO  Lab Results   Component Value Date    AST 14 12/17/2017     Lab Results   Component Value Date    ALT 30 12/17/2017     Lab Results   Component Value Date    A1C 10.3 12/17/2017    A1C 7.6 05/22/2017    A1C 8.3 01/09/2017    A1C 11.1 12/15/2015    A1C 8.0 07/03/2015     Potassium   Date Value Ref Range Status   12/17/2017 3.8 3.4 - 5.3 mmol/L Final

## 2017-12-21 NOTE — TELEPHONE ENCOUNTER
Requested Prescriptions   Pending Prescriptions Disp Refills     losartan (COZAAR) 50 MG tablet [Pharmacy Med Name: LOSARTAN 50MG TABLET] 90 tablet      Sig: TAKE 1 TABLET BY MOUTH EVERY DAY    Angiotensin-II Receptors Failed    12/21/2017 10:23 AM       Failed - Blood pressure under 140/90 in past 12 months.    BP Readings from Last 3 Encounters:   12/18/17 (!) 160/98   11/29/17 130/82   11/06/17 139/90                Passed - Recent or future visit with authorizing provider's specialty    Patient had office visit in the last year or has a visit in the next 30 days with authorizing provider.  See chart review.              Passed - Patient is age 18 or older       Passed - Normal serum creatinine on file in past 12 months    Recent Labs   Lab Test  12/17/17 2145   CR  0.96            Passed - Normal serum potassium on file in past 12 months    Recent Labs   Lab Test  12/17/17 2145   POTASSIUM  3.8

## 2017-12-21 NOTE — TELEPHONE ENCOUNTER
Requested Prescriptions   Pending Prescriptions Disp Refills     minocycline (MINOCIN/DYNACIN) 100 MG capsule [Pharmacy Med Name: MINOCYCLINE 100MG CAP] 60 capsule      Sig: TAKE 1 CAPSULE BY MOUTH TWICE A DAY    Oral Acne/Rosacea Medications Protocol Failed    12/21/2017 10:25 AM       Failed - Confirmation of diagnosis is required    Please confirm diagnosis is acne or rosacea.     If NOT acne or rosacea; refer request to provider for further evaluation.    If diagnosis IS acne or rosacea, OK to refill BASED ON PREVIOUS REFILL CLINICAL NOTE RECOMMENDATION.         Passed - Patient is 12 years of age or older       Passed - Recent or future visit with authorizing provider's specialty    Patient had office visit in the last year or has a visit in the next 30 days with authorizing provider.  See chart review.

## 2017-12-21 NOTE — TELEPHONE ENCOUNTER
minocycline (MINOCIN/DYNACIN) 100 MG capsule      Last Written Prescription Date: 6/14/17  Last Fill Quantity: 60,  # refills: 5   Last Office Visit with [facility], [facility] or [facility] prescribing provider: 11/29/17

## 2017-12-21 NOTE — TELEPHONE ENCOUNTER
Routing refill request to provider for review/approval because:  Labs out of range:  BP, microalbumin    Mary Marroquin, RN  [facility]

## 2017-12-22 ENCOUNTER — OFFICE VISIT (OUTPATIENT)
Dept: SURGERY | Facility: CLINIC | Age: 37
End: 2017-12-22
Payer: COMMERCIAL

## 2017-12-22 VITALS — HEART RATE: 97 BPM | TEMPERATURE: 97.2 F | BODY MASS INDEX: 53.38 KG/M2 | OXYGEN SATURATION: 100 % | WEIGHT: 311 LBS

## 2017-12-22 DIAGNOSIS — K63.89 EPIPLOIC APPENDAGITIS: ICD-10-CM

## 2017-12-22 DIAGNOSIS — R10.31 RIGHT LOWER QUADRANT ABDOMINAL PAIN: Primary | ICD-10-CM

## 2017-12-22 PROCEDURE — 99204 OFFICE O/P NEW MOD 45 MIN: CPT | Performed by: SURGERY

## 2017-12-22 ASSESSMENT — PAIN SCALES - GENERAL: PAINLEVEL: SEVERE PAIN (7)

## 2017-12-22 NOTE — MR AVS SNAPSHOT
After Visit Summary   12/22/2017    Waqas Allen    MRN: [id number]           Patient Information     Date Of Birth          1980        Visit Information        Provider Department      12/22/2017 11:30 AM Manas Teresa, DO [facility]         Follow-ups after your visit        Who to contact     If you have questions or need follow up information about today's clinic visit or your schedule please contact [facility] directly at [phone].  Normal or non-critical lab and imaging results will be communicated to you by [employer], letter or phone within 4 business days after the clinic has received the results. If you do not hear from us within 7 days, please contact the clinic through [employer] or phone. If you have a critical or abnormal lab result, we will notify you by phone as soon as possible.  Submit refill requests through [employer] or call your pharmacy and they will forward the refill request to us. Please allow 3 business days for your refill to be completed.          Additional Information About Your Visit        MyChart Information     [employer] gives you secure access to your electronic health record. If you see a primary care provider, you can also send messages to your care team and make appointments. If you have questions, please call your primary care clinic.  If you do not have a primary care provider, please call [phone] and they will assist you.        Care EveryWhere ID     This is your Care EveryWhere ID. This could be used by other organizations to access your [place] medical records  [id number]        Your Vitals Were     Pulse Temperature Pulse Oximetry BMI (Body Mass Index)          97 97.2  F (36.2  C) (Temporal) 100% 53.38 kg/m2         Blood Pressure from Last 3 Encounters:   12/18/17 (!) 160/98   11/29/17 130/82   11/06/17 139/90    Weight from Last 3 Encounters:   12/22/17 (!) 141.1 kg (311 lb)   12/17/17 136.1 kg (300  lb)   11/29/17 (!) 142.6 kg (314 lb 6.4 oz)              Today, you had the following     No orders found for display       Primary Care Provider Office Phone # Fax #    Michael Palumbo [phone]       [address] [facility] [address]        Equal Access to Services     : Hadii aad ku hadasho Soomaali, waaxda luqadaha, qaybta kaalmada adeegyada, waxay idiin hayaan adeeg dionilizzygavin latami . So [facility] [phone].    ATENCIÓN: Si habla español, tiene a lancaster disposición servicios gratuitos de asistencia lingüística. Llame al [phone].    We comply with applicable federal civil rights laws and [place] laws. We do not discriminate on the basis of race, color, national origin, age, disability, sex, sexual orientation, or gender identity.            Thank you!     Thank you for choosing [facility]  for your care. Our goal is always to provide you with excellent care. Hearing back from our patients is one way we can continue to improve our services. Please take a few minutes to complete the written survey that you may receive in the mail after your visit with us. Thank you!             Your Updated Medication List - Protect others around you: Learn how to safely use, store and throw away your medicines at www.disposemymeds.org.          This list is accurate as of: 12/22/17 11:31 AM.  Always use your most recent med list.                   Brand Name Dispense Instructions for use Diagnosis    aspirin 81 MG tablet     100    ONE DAILY    Essential hypertension, benign       blood glucose monitoring lancets     3 Box    1 each 2 times daily    Type 2 diabetes mellitus (H)       blood glucose monitoring test strip    ACCU-CHEK SMARTVIEW    100 each    1 strip by In Vitro route 2 times daily    Type 2 diabetes, HbA1c goal < 7% (H)       doxycycline 100 MG capsule    VIBRAMYCIN    28 capsule    Take 1 capsule (100 mg) by mouth 2 times daily for 14 days        glipiZIDE  10 MG tablet    GLUCOTROL    180 tablet    Take 1 tablet (10 mg) by mouth 2 times daily (before meals)    Type 2 diabetes mellitus without complication, without long-term current use of insulin (H)       losartan 50 MG tablet    COZAAR    90 tablet    TAKE 1 TABLET BY MOUTH EVERY DAY    Hypertension goal BP (blood pressure) < 140/90       minocycline 100 MG capsule    MINOCIN/DYNACIN    60 capsule    TAKE 1 CAPSULE BY MOUTH TWICE A DAY    Acne vulgaris       oxyCODONE IR 5 MG tablet    ROXICODONE    10 tablet    Take 1 tablet (5 mg) by mouth every 6 hours as needed for pain        oxyCODONE-acetaminophen 5-325 MG per tablet    PERCOCET    15 tablet    Take 1-2 tablets by mouth every 4 hours as needed for pain        pioglitazone 15 MG tablet    ACTOS    30 tablet    TAKE 1 TABLET BY MOUTH EVERY DAY    Type 2 diabetes mellitus without complication, without long-term current use of insulin (H)       POTASSIUM GLUCONATE PO      Take 1 tablet by mouth daily        zinc 50 MG Tabs      Take 1 tablet by mouth daily
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
## 2017-12-22 NOTE — NURSING NOTE
"Chief Complaint   Patient presents with     Abdominal Pain     Epiploic appendagitis-abdomen pain     Consult     referring Margy       Initial Pulse 97  Temp 97.2  F (36.2  C) (Temporal)  Wt (!) 141.1 kg (311 lb)  SpO2 100%  BMI 53.38 kg/m2 Estimated body mass index is 53.38 kg/(m^2) as calculated from the following:    Height as of 5/22/17: 1.626 m (5' 4\").    Weight as of this encounter: 141.1 kg (311 lb).  Medication Reconciliation: complete    "
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
## 2017-12-27 ENCOUNTER — HOSPITAL ENCOUNTER (OUTPATIENT)
Dept: CT IMAGING | Facility: CLINIC | Age: 37
Discharge: HOME | End: 2017-12-27
Attending: SURGERY | Admitting: SURGERY
Payer: COMMERCIAL

## 2017-12-27 DIAGNOSIS — R10.31 RIGHT LOWER QUADRANT ABDOMINAL PAIN: ICD-10-CM

## 2017-12-27 PROCEDURE — 25000128 H RX IP 250 OP 636: Performed by: RADIOLOGY

## 2017-12-27 PROCEDURE — 25000125 ZZHC RX 250: Performed by: RADIOLOGY

## 2017-12-27 PROCEDURE — 74177 CT ABD & PELVIS W/CONTRAST: CPT

## 2017-12-27 RX ORDER — IOPAMIDOL 755 MG/ML
500 INJECTION, SOLUTION INTRAVASCULAR ONCE
Status: COMPLETED | OUTPATIENT
Start: 2017-12-27 | End: 2017-12-27

## 2017-12-27 RX ADMIN — IOPAMIDOL 100 ML: 755 INJECTION, SOLUTION INTRAVENOUS at 13:47

## 2017-12-27 RX ADMIN — SODIUM CHLORIDE 60 ML: 9 INJECTION, SOLUTION INTRAVENOUS at 13:48

## 2017-12-29 ENCOUNTER — TELEPHONE (OUTPATIENT)
Dept: SURGERY | Facility: CLINIC | Age: 37
End: 2017-12-29

## 2017-12-29 NOTE — TELEPHONE ENCOUNTER
Discussed the CT results with Chris. Epiploic appendagitis has now completely resolved. He reports that he still does have the pain, but perhaps it is slowly improving. We discussed how if the pain were to persist or he notices changes to his stool, the next step would be to look with a colonoscope. We agreed that watchful waiting for now would be the best approach and he will call the office in 2 weeks or more if his symptoms aren't improved.

## 2018-02-05 ENCOUNTER — OFFICE VISIT (OUTPATIENT)
Dept: FAMILY MEDICINE | Facility: OTHER | Age: 38
End: 2018-02-05
Payer: COMMERCIAL

## 2018-02-05 VITALS
RESPIRATION RATE: 20 BRPM | TEMPERATURE: 97.9 F | BODY MASS INDEX: 52.49 KG/M2 | WEIGHT: 305.8 LBS | HEART RATE: 102 BPM | OXYGEN SATURATION: 100 % | SYSTOLIC BLOOD PRESSURE: 160 MMHG | DIASTOLIC BLOOD PRESSURE: 92 MMHG

## 2018-02-05 DIAGNOSIS — N52.9 VASCULOGENIC ERECTILE DYSFUNCTION, UNSPECIFIED VASCULOGENIC ERECTILE DYSFUNCTION TYPE: ICD-10-CM

## 2018-02-05 DIAGNOSIS — L70.0 ACNE VULGARIS: ICD-10-CM

## 2018-02-05 DIAGNOSIS — E11.9 TYPE 2 DIABETES MELLITUS WITHOUT COMPLICATION, WITHOUT LONG-TERM CURRENT USE OF INSULIN (H): Primary | ICD-10-CM

## 2018-02-05 DIAGNOSIS — G89.18 POST-OP PAIN: ICD-10-CM

## 2018-02-05 DIAGNOSIS — M19.019 AC JOINT ARTHROPATHY: ICD-10-CM

## 2018-02-05 DIAGNOSIS — E66.01 MORBID OBESITY (H): ICD-10-CM

## 2018-02-05 DIAGNOSIS — I10 HYPERTENSION GOAL BP (BLOOD PRESSURE) < 140/90: ICD-10-CM

## 2018-02-05 LAB — HBA1C MFR BLD: 10.1 % (ref 4.3–6)

## 2018-02-05 PROCEDURE — 99214 OFFICE O/P EST MOD 30 MIN: CPT | Performed by: FAMILY MEDICINE

## 2018-02-05 PROCEDURE — 36415 COLL VENOUS BLD VENIPUNCTURE: CPT | Performed by: FAMILY MEDICINE

## 2018-02-05 PROCEDURE — 83036 HEMOGLOBIN GLYCOSYLATED A1C: CPT | Performed by: FAMILY MEDICINE

## 2018-02-05 RX ORDER — SILDENAFIL 50 MG/1
50 TABLET, FILM COATED ORAL DAILY PRN
Qty: 12 TABLET | Refills: 11 | Status: SHIPPED | OUTPATIENT
Start: 2018-02-05

## 2018-02-05 RX ORDER — METHOCARBAMOL 750 MG/1
750 TABLET, FILM COATED ORAL EVERY 6 HOURS PRN
Qty: 60 TABLET | Refills: 0 | Status: SHIPPED | OUTPATIENT
Start: 2018-02-05 | End: 2019-04-03

## 2018-02-05 RX ORDER — OXYCODONE HYDROCHLORIDE 5 MG/1
5 TABLET ORAL EVERY 6 HOURS PRN
Qty: 10 TABLET | Refills: 0 | Status: CANCELLED | OUTPATIENT
Start: 2018-02-05

## 2018-02-05 RX ORDER — MINOCYCLINE HYDROCHLORIDE 100 MG/1
CAPSULE ORAL
Qty: 60 CAPSULE | Refills: 11 | Status: SHIPPED | OUTPATIENT
Start: 2018-02-05 | End: 2019-02-06

## 2018-02-05 RX ORDER — METHOCARBAMOL 750 MG/1
750 TABLET, FILM COATED ORAL EVERY 6 HOURS PRN
Qty: 60 TABLET | Refills: 0 | Status: CANCELLED | OUTPATIENT
Start: 2018-02-05

## 2018-02-05 RX ORDER — PIOGLITAZONEHYDROCHLORIDE 30 MG/1
30 TABLET ORAL DAILY
Qty: 90 TABLET | Refills: 1 | Status: SHIPPED | OUTPATIENT
Start: 2018-02-05 | End: 2018-10-08

## 2018-02-05 RX ORDER — LOSARTAN POTASSIUM 100 MG/1
100 TABLET ORAL DAILY
Qty: 90 TABLET | Refills: 1 | Status: SHIPPED | OUTPATIENT
Start: 2018-02-05 | End: 2018-08-19

## 2018-02-05 ASSESSMENT — PAIN SCALES - GENERAL: PAINLEVEL: NO PAIN (0)

## 2018-02-05 NOTE — PROGRESS NOTES
SUBJECTIVE:   Waqas Allen is a 37 year old male who presents to clinic today for the following health issues:       New Patient/Transfer of Care  Patient has been under the care of Michael Worrell who retired Patient requests transfer care to me.   Patient medications reconciled, PMH and Problem list reviewed and HM updated.    Diabetes Follow-up      Patient is checking blood sugars: rarely. Needs new glucose meter and requests one which he doesn't have to poke himself.    Diabetic concerns: other - Erectile dysfunction     Symptoms of hypoglycemia (low blood sugar): none     Paresthesias (numbness or burning in feet) or sores: No     Date of last diabetic eye exam: unknown    BP Readings from Last 2 Encounters:   02/05/18 (!) 160/92   12/18/17 (!) 160/98     Hemoglobin A1C (%)   Date Value   02/05/2018 10.1 (H)   12/17/2017 10.3 (H)     LDL Cholesterol Direct (mg/dL)   Date Value   01/09/2017 58   02/16/2015 45       Problem list and histories reviewed & adjusted, as indicated.  Additional history: as documented    Patient Active Problem List   Diagnosis     Acne     Hypertension goal BP (blood pressure) < 140/90     Sprain of acromioclavicular ligament     Type 2 diabetes mellitus without complication, without long-term current use of insulin (H)     AC joint arthropathy     Past Surgical History:   Procedure Laterality Date     ARTHROSCOPY SHOULDER DECOMPRESSION Left 1/20/2016    Procedure: ARTHROSCOPY SHOULDER DECOMPRESSION;  Surgeon: Watson Duckworth MD;  Location: [facility]     ARTHROSCOPY SHOULDER DECOMPRESSION Right 2/1/2017    Procedure: ARTHROSCOPY SHOULDER DECOMPRESSION;  Surgeon: Watson Duckworth MD;  Location[facility]     ARTHROSCOPY SHOULDER DISTAL CLAVICLE REPAIR Left 1/20/2016    Procedure: ARTHROSCOPY SHOULDER DISTAL CLAVICLE RESECTION;  Surgeon: Watson Duckworth MD;  Location: [facility]     ARTHROSCOPY SHOULDER DISTAL CLAVICLE REPAIR Right 2/1/2017    Procedure: ARTHROSCOPY  "SHOULDER DISTAL CLAVICLE RESECTION;  Surgeon: Watson Duckworth MD;  Location: [facility]     LAMINECT/DISCECTOMY, LUMBAR  04/24/2007    Left L3-L4 Hemilaminectomy/Microdiskectomy.  [facility]       Social History   Substance Use Topics     Smoking status: Never Smoker     Smokeless tobacco: Never Used     Alcohol use 0.0 oz/week     0 Standard drinks or equivalent per week      Comment: occ     Family History   Problem Relation Age of Onset     Hypertension Brother      CEREBROVASCULAR DISEASE Maternal Grandfather      Unknown/Adopted Father          Current Outpatient Prescriptions   Medication Sig Dispense Refill     MELATONIN PO        losartan (COZAAR) 50 MG tablet TAKE 1 TABLET BY MOUTH EVERY DAY 90 tablet 0     minocycline (MINOCIN/DYNACIN) 100 MG capsule TAKE 1 CAPSULE BY MOUTH TWICE A DAY 60 capsule 1     pioglitazone (ACTOS) 15 MG tablet TAKE 1 TABLET BY MOUTH EVERY DAY 30 tablet 1     oxyCODONE IR (ROXICODONE) 5 MG tablet Take 1 tablet (5 mg) by mouth every 6 hours as needed for pain 10 tablet 0     glipiZIDE (GLUCOTROL) 10 MG tablet Take 1 tablet (10 mg) by mouth 2 times daily (before meals) 180 tablet 3     zinc 50 MG TABS Take 1 tablet by mouth daily       POTASSIUM GLUCONATE PO Take 1 tablet by mouth daily       ASPIRIN 81 MG OR TABS ONE DAILY 100 3     blood glucose monitoring (SOFTCLIX) lancets 1 each 2 times daily (Patient not taking: Reported on 11/29/2017) 3 Box 2     blood glucose monitoring (ACCU-CHEK SMARTVIEW) test strip 1 strip by In Vitro route 2 times daily (Patient not taking: Reported on 11/29/2017) 100 each 11     Allergies   Allergen Reactions     Anaprox [Naproxen Sodium]      Nausea/vomiting     Darvocet [Propoxyphene N-Apap] Other (See Comments)     \"felt like I had a hangover\".  Patient tolerates Tylenol/Acetaminophen     Hctz [Hydrochlorothiazide] Other (See Comments)     impotence     Indocin [Indomethacin] GI Disturbance     Metformin GI Disturbance     Tramadol GI Disturbance "     Recent Labs   Lab Test  12/17/17   2145  11/06/17   1735  05/22/17   1500  01/09/17   1412   02/16/15   1018   01/17/14   1544   A1C  10.3*   --   7.6*  8.3*   < >  7.6*   < >   --    LDL   --    --    --   58   --   45   --   52   ALT  30  26   --   25   --   39   --   43   CR  0.96  0.99   --   0.93   < >  0.85   < >  0.90   GFRESTIMATED  88  85   --   >90  Non  GFR Calc     < >  >90  Non  GFR Calc     < >  >90   GFRESTBLACK  >90  >90   --   >90   GFR Calc     < >  >90   GFR Calc     < >  >90   POTASSIUM  3.8  4.1   --   4.0   < >  3.9   < >  4.0   TSH   --    --    --   2.84   --   4.96*   --   2.43    < > = values in this interval not displayed.      BP Readings from Last 3 Encounters:   02/05/18 130/90   12/18/17 (!) 160/98   11/29/17 130/82    Wt Readings from Last 3 Encounters:   02/05/18 (!) 305 lb 12.8 oz (138.7 kg)   12/22/17 (!) 311 lb (141.1 kg)   12/17/17 300 lb (136.1 kg)                  Labs reviewed in EPIC    Reviewed and updated as needed this visit by clinical staff  Tobacco  Allergies  Meds  Problems  Med Hx  Surg Hx  Fam Hx  Soc Hx        Reviewed and updated as needed this visit by Provider  Allergies  Meds  Problems         ROS:  Constitutional, HEENT, cardiovascular, pulmonary, gi and gu systems are negative, except as otherwise noted.    OBJECTIVE:     BP (!) 160/92 (BP Location: Right arm, Patient Position: Sitting, Cuff Size: Adult Large)  Pulse 102  Temp 97.9  F (36.6  C) (Temporal)  Resp 20  Wt (!) 305 lb 12.8 oz (138.7 kg)  SpO2 100%  BMI 52.49 kg/m2  Body mass index is 52.49 kg/(m^2).  GENERAL: alert, no distress and obese  NECK: no adenopathy, no asymmetry, masses, or scars and thyroid normal to palpation  RESP: lungs clear to auscultation - no rales, rhonchi or wheezes  CV: regular rate and rhythm, normal S1 S2, no S3 or S4, no murmur, click or rub, no peripheral edema and peripheral pulses  strong  ABDOMEN: soft, nontender, no hepatosplenomegaly, no masses and bowel sounds normal  MS: no gross musculoskeletal defects noted, no edema    Diagnostic Test Results:  Results for orders placed or performed in visit on 02/05/18 (from the past 24 hour(s))   Hemoglobin A1c   Result Value Ref Range    Hemoglobin A1C 10.1 (H) 4.3 - 6.0 %       ASSESSMENT/PLAN:     Diabetes Type II, A1c Uncontrolled, non-insulin dependent, patient resistant to consider injections and intolerant of Metformin   Associated with the following complications    Renal Complications:  None    Ophthalmologic Complications: None    Neurologic Complications: None    Peripheral Vascular Complications:   Erectile dysfunction.    Other: None   Plan:  The following changes are made - Increase Actos to 30 mg. Continue Glipizide.       Hypertension; slightly worsened   Associated with the following complications:    Diabetes   Plan:  The following changes are made - Increase Losartan to 100 mg     3. Acne vulgaris  Chronic. The current medical regimen is effective;  continue present plan and medications.  - minocycline (MINOCIN/DYNACIN) 100 MG capsule; TAKE 1 CAPSULE BY MOUTH TWICE A DAY  Dispense: 60 capsule; Refill: 11    4. AC joint arthropathy  Chronic. Patient requesting chronic narcotic to have just in case. Declined  - methocarbamol (ROBAXIN) 750 MG tablet; Take 1 tablet (750 mg) by mouth every 6 hours as needed for muscle spasms (muscle spasm)  Dispense: 60 tablet; Refill: 0      6. Vasculogenic erectile dysfunction, unspecified vasculogenic erectile dysfunction type  Chronic ED due to poorly controlled DM and HTN. Emphasized importance of DM and HTN control. Will trial Viagra.   - sildenafil (VIAGRA) 50 MG tablet; Take 1 tablet (50 mg) by mouth daily as needed 30 min to 4 hrs before sex. Do not use with nitroglycerin, terazosin or doxazosin.  Dispense: 12 tablet; Refill: 11    7. Morbid obesity (H)  BMI:   Estimated body mass index is  "52.49 kg/(m^2) as calculated from the following:    Height as of 5/22/17: 5' 4\" (1.626 m).    Weight as of this encounter: 305 lb 12.8 oz (138.7 kg).   Weight management plan: Discussed healthy diet and exercise guidelines and patient will follow up in 3 months in clinic to re-evaluate. Medical weight loss clinic.        FUTURE LABS:       - Schedule non-fasting labs in 3 months  FUTURE APPOINTMENTS:       - Follow-up visit in 3 months.  SELF MONITORING:       - Please check blood glucose readings daily       - Please check blood pressure readings daily  Work on weight loss  Regular exercise    James Paniagua MD  [facility]  "

## 2018-02-05 NOTE — MR AVS SNAPSHOT
After Visit Summary   2/5/2018    Waqas Allen    MRN: [id number]           Patient Information     Date Of Birth          1980        Visit Information        Provider Department      2/5/2018 2:30 PM James Paniagua MD [facility]        Today's Diagnoses     Type 2 diabetes mellitus without complication, without long-term current use of insulin (H)    -  1    Hypertension goal BP (blood pressure) < 140/90        Acne vulgaris        AC joint arthropathy        Post-op pain        Vasculogenic erectile dysfunction, unspecified vasculogenic erectile dysfunction type           Follow-ups after your visit        Follow-up notes from your care team     Return in about 3 months (around 5/5/2018) for recheck diabetes.      Who to contact     If you have questions or need follow up information about today's clinic visit or your schedule please contact [facility] directly at [phone].  Normal or non-critical lab and imaging results will be communicated to you by Tenantrexhart, letter or phone within 4 business days after the clinic has received the results. If you do not hear from us within 7 days, please contact the clinic through Tenant[employer]hart or phone. If you have a critical or abnormal lab result, we will notify you by phone as soon as possible.  Submit refill requests through Exose[employer] or call your pharmacy and they will forward the refill request to us. Please allow 3 business days for your refill to be completed.          Additional Information About Your Visit        MyChart Information     Ex[employer]ct gives you secure access to your electronic health record. If you see a primary care provider, you can also send messages to your care team and make appointments. If you have questions, please call your primary care clinic.  If you do not have a primary care provider, please call [phone] and they will assist you.        Care EveryWhere ID     This is your Care EveryWhere ID.  This could be used by other organizations to access your [facility] medical records  [id number]        Your Vitals Were     Pulse Temperature Respirations Pulse Oximetry BMI (Body Mass Index)       102 97.9  F (36.6  C) (Temporal) 20 100% 52.49 kg/m2        Blood Pressure from Last 3 Encounters:   02/05/18 (!) 160/92   12/18/17 (!) 160/98   11/29/17 130/82    Weight from Last 3 Encounters:   02/05/18 (!) 305 lb 12.8 oz (138.7 kg)   12/22/17 (!) 311 lb (141.1 kg)   12/17/17 300 lb (136.1 kg)              We Performed the Following     Hemoglobin A1c          Today's Medication Changes          These changes are accurate as of 2/5/18  3:27 PM.  If you have any questions, ask your nurse or doctor.               Start taking these medicines.        Dose/Directions    methocarbamol 750 MG tablet   Commonly known as:  ROBAXIN   Used for:  AC joint arthropathy, Post-op pain   Started by:  James Paniagua MD        Dose:  750 mg   Take 1 tablet (750 mg) by mouth every 6 hours as needed for muscle spasms (muscle spasm)   Quantity:  60 tablet   Refills:  0       order for DME   Used for:  Type 2 diabetes mellitus without complication, without long-term current use of insulin (H)   Started by:  James Paniagua MD        Equipment being ordered: glucose meter, Dexcom G5   Quantity:  1 Units   Refills:  1       sildenafil 50 MG tablet   Commonly known as:  VIAGRA   Used for:  Vasculogenic erectile dysfunction, unspecified vasculogenic erectile dysfunction type   Started by:  James Paniagua MD        Dose:  50 mg   Take 1 tablet (50 mg) by mouth daily as needed 30 min to 4 hrs before sex. Do not use with nitroglycerin, terazosin or doxazosin.   Quantity:  12 tablet   Refills:  11         These medicines have changed or have updated prescriptions.        Dose/Directions    losartan 100 MG tablet   Commonly known as:  COZAAR   This may have changed:  See the new instructions.   Used for:  Hypertension goal BP (blood pressure) <  140/90   Changed by:  James Paniagua MD        Dose:  100 mg   Take 1 tablet (100 mg) by mouth daily   Quantity:  90 tablet   Refills:  1       minocycline 100 MG capsule   Commonly known as:  MINOCIN/DYNACIN   This may have changed:  See the new instructions.   Used for:  Acne vulgaris   Changed by:  James Paniagua MD        TAKE 1 CAPSULE BY MOUTH TWICE A DAY   Quantity:  60 capsule   Refills:  11       pioglitazone 30 MG tablet   Commonly known as:  ACTOS   This may have changed:  See the new instructions.   Used for:  Type 2 diabetes mellitus without complication, without long-term current use of insulin (H)   Changed by:  James Paniagua MD        Dose:  30 mg   Take 1 tablet (30 mg) by mouth daily   Quantity:  90 tablet   Refills:  1            Where to get your medicines      These medications were sent to [id number] - [place], [place] - [address] [place] [address]  [address] [place] [id number]    Hours:  M-F 8:30-6:30; Sat 9-4; closed Sunday Phone:  [phone]     losartan 100 MG tablet    methocarbamol 750 MG tablet    minocycline 100 MG capsule    pioglitazone 30 MG tablet    sildenafil 50 MG tablet         Some of these will need a paper prescription and others can be bought over the counter.  Ask your nurse if you have questions.     Bring a paper prescription for each of these medications     order for DME                Primary Care Provider Office Phone # Fax #    Michael Palumbo [phone]       [address] [facility] [address]        Equal Access to Services     [facility]: Hadii maria m mcghee Sodamaso, waaxda luqadaha, qaybta kaalmada ademarkyada, luly armendariz. So [facility] [phone].    ATENCIÓN: Si habla español, tiene a lancaster disposición servicios gratuitos de asistencia lingüística. Llame al [phone].    We comply with applicable federal civil rights laws and [place] laws. We do not discriminate on the basis of race, color, national  origin, age, disability, sex, sexual orientation, or gender identity.            Thank you!     Thank you for choosing [facility]  for your care. Our goal is always to provide you with excellent care. Hearing back from our patients is one way we can continue to improve our services. Please take a few minutes to complete the written survey that you may receive in the mail after your visit with us. Thank you!             Your Updated Medication List - Protect others around you: Learn how to safely use, store and throw away your medicines at www.disposemymeds.org.          This list is accurate as of 2/5/18  3:27 PM.  Always use your most recent med list.                   Brand Name Dispense Instructions for use Diagnosis    aspirin 81 MG tablet     100    ONE DAILY    Essential hypertension, benign       blood glucose monitoring lancets     3 Box    1 each 2 times daily    Type 2 diabetes mellitus (H)       blood glucose monitoring test strip    ACCU-CHEK SMARTVIEW    100 each    1 strip by In Vitro route 2 times daily    Type 2 diabetes, HbA1c goal < 7% (H)       glipiZIDE 10 MG tablet    GLUCOTROL    180 tablet    Take 1 tablet (10 mg) by mouth 2 times daily (before meals)    Type 2 diabetes mellitus without complication, without long-term current use of insulin (H)       losartan 100 MG tablet    COZAAR    90 tablet    Take 1 tablet (100 mg) by mouth daily    Hypertension goal BP (blood pressure) < 140/90       MELATONIN PO           methocarbamol 750 MG tablet    ROBAXIN    60 tablet    Take 1 tablet (750 mg) by mouth every 6 hours as needed for muscle spasms (muscle spasm)    AC joint arthropathy, Post-op pain       minocycline 100 MG capsule    MINOCIN/DYNACIN    60 capsule    TAKE 1 CAPSULE BY MOUTH TWICE A DAY    Acne vulgaris       order for DME     1 Units    Equipment being ordered: glucose meter, Dexcom G5    Type 2 diabetes mellitus without complication, without long-term current use of  insulin (H)       oxyCODONE IR 5 MG tablet    ROXICODONE    10 tablet    Take 1 tablet (5 mg) by mouth every 6 hours as needed for pain        pioglitazone 30 MG tablet    ACTOS    90 tablet    Take 1 tablet (30 mg) by mouth daily    Type 2 diabetes mellitus without complication, without long-term current use of insulin (H)       POTASSIUM GLUCONATE PO      Take 1 tablet by mouth daily        sildenafil 50 MG tablet    VIAGRA    12 tablet    Take 1 tablet (50 mg) by mouth daily as needed 30 min to 4 hrs before sex. Do not use with nitroglycerin, terazosin or doxazosin.    Vasculogenic erectile dysfunction, unspecified vasculogenic erectile dysfunction type       zinc 50 MG Tabs      Take 1 tablet by mouth daily

## 2018-02-05 NOTE — NURSING NOTE
"Chief Complaint   Patient presents with     Establish Care       Initial /90 (BP Location: Right arm, Patient Position: Chair, Cuff Size: Adult Large)  Pulse 102  Temp 97.9  F (36.6  C) (Temporal)  Resp 20  Wt (!) 305 lb 12.8 oz (138.7 kg)  SpO2 100%  BMI 52.49 kg/m2 Estimated body mass index is 52.49 kg/(m^2) as calculated from the following:    Height as of 5/22/17: 5' 4\" (1.626 m).    Weight as of this encounter: 305 lb 12.8 oz (138.7 kg).  Medication Reconciliation: complete     Radha Johnston MA 2/5/2018  2:47 PM        "

## 2018-02-23 DIAGNOSIS — E11.9 TYPE 2 DIABETES MELLITUS WITHOUT COMPLICATION, WITHOUT LONG-TERM CURRENT USE OF INSULIN (H): Primary | ICD-10-CM

## 2018-02-23 NOTE — TELEPHONE ENCOUNTER
"Note from Pharmacy    Patient is requesting prescription for contour next meter, test strips, and Microlet lancet. Thanks      Radha Johnston MA 2/23/2018  11:23 AM      Last Written Prescription Date:  Accu-Chek Test Strip  Last Fill Quantity: 100,  # refills: 11   Last office visit: 2/5/2018 with prescribing provider:  Dr. Palumbo   Future Office Visit:   Next 5 appointments (look out 90 days)     Mar 16, 2018 10:30 AM CDT   Office Visit with Vignesh [facility]choa MD   [facility]    [address] [place] [id number] [phone]                 Requested Prescriptions   Pending Prescriptions Disp Refills     blood glucose monitoring (NANCY CONTOUR MONITOR) meter device kit 1 kit 0     Sig: Use to test blood sugars 2 times daily or as directed.    Diabetic Supplies Protocol Passed    2/23/2018 11:27 AM       Passed - Patient is 18 years of age or older       Passed - Patient has had appt within past 6 mos    Patient had office visit in the last 6 months or has a visit in the next 30 days with authorizing provider.  See \"Patient Info\" tab in inbasket, or \"Choose Columns\" in Meds & Orders section of the refill encounter.            blood glucose monitoring (NO BRAND SPECIFIED) test strip 100 strip      Sig: Use to test blood sugars 2 times daily or as directed    Diabetic Supplies Protocol Passed    2/23/2018 11:27 AM       Passed - Patient is 18 years of age or older       Passed - Patient has had appt within past 6 mos    Patient had office visit in the last 6 months or has a visit in the next 30 days with authorizing provider.  See \"Patient Info\" tab in inbasket, or \"Choose Columns\" in Meds & Orders section of the refill encounter.            MICROLET LANCETS MISC      Diabetic Supplies Protocol Passed    2/23/2018 11:27 AM       Passed - Patient is 18 years of age or older       Passed - Patient has had appt within past 6 mos    Patient had office visit in " "the last 6 months or has a visit in the next 30 days with authorizing provider.  See \"Patient Info\" tab in inbasket, or \"Choose Columns\" in Meds & Orders section of the refill encounter.            Last Written Prescription Date:  SoftClix Lancets  Last Fill Quantity: 3,  # refills: 2   Last office visit: 2/5/2018 with prescribing provider:  Dr. Palumbo   Future Office Visit:   Next 5 appointments (look out 90 days)     Mar 16, 2018 10:30 AM CDT   Office Visit with Vignesh [facility]choa MD   [facility]    [address] [place] [id number]   [phone]                 Radha Johnston MA 2/23/2018  11:35 AM        "

## 2018-02-26 RX ORDER — LANCETS
EACH MISCELLANEOUS
Qty: 100 EACH | Refills: 3 | Status: SHIPPED | OUTPATIENT
Start: 2018-02-26 | End: 2022-11-11

## 2018-02-26 NOTE — TELEPHONE ENCOUNTER
Monitor  Routing refill request to provider for review/approval because:  Drug not on the [facility] refill protocol     Test strips & Lancets  Routing refill request to provider for review/approval because:  A break in medication    Rivas Diez RN, BSN

## 2018-03-16 ENCOUNTER — OFFICE VISIT (OUTPATIENT)
Dept: FAMILY MEDICINE | Facility: CLINIC | Age: 38
End: 2018-03-16
Payer: COMMERCIAL

## 2018-03-16 VITALS
TEMPERATURE: 98.6 F | OXYGEN SATURATION: 100 % | HEIGHT: 64 IN | HEART RATE: 80 BPM | DIASTOLIC BLOOD PRESSURE: 80 MMHG | SYSTOLIC BLOOD PRESSURE: 148 MMHG | WEIGHT: 306.13 LBS | BODY MASS INDEX: 52.26 KG/M2 | RESPIRATION RATE: 18 BRPM

## 2018-03-16 DIAGNOSIS — I10 HYPERTENSION GOAL BP (BLOOD PRESSURE) < 140/90: ICD-10-CM

## 2018-03-16 DIAGNOSIS — E11.9 TYPE 2 DIABETES MELLITUS WITHOUT COMPLICATION, WITHOUT LONG-TERM CURRENT USE OF INSULIN (H): Primary | ICD-10-CM

## 2018-03-16 DIAGNOSIS — E66.01 MORBID OBESITY (H): ICD-10-CM

## 2018-03-16 PROCEDURE — 99213 OFFICE O/P EST LOW 20 MIN: CPT | Performed by: FAMILY MEDICINE

## 2018-03-16 ASSESSMENT — PAIN SCALES - GENERAL: PAINLEVEL: MILD PAIN (3)

## 2018-03-16 NOTE — MR AVS SNAPSHOT
After Visit Summary   3/16/2018    Waqas Allen    MRN: [id number]           Patient Information     Date Of Birth          1980        Visit Information        Provider Department      3/16/2018 10:30 AM Vignesh Ochoa MD [facility]         Follow-ups after your visit        Your next 10 appointments already scheduled     Mar 26, 2018  8:00 AM CDT   Office Visit with Chance Trent DO   [facility]    [address] [facility] [id number] [phone]           Bring a current list of meds and any records pertaining to this visit. For Physicals, please bring immunization records and any forms needing to be filled out. Please arrive 10 minutes early to complete paperwork.              Who to contact     If you have questions or need follow up information about today's clinic visit or your schedule please contact [facility] directly at [phone].  Normal or non-critical lab and imaging results will be communicated to you by [employer], letter or phone within 4 business days after the clinic has received the results. If you do not hear from us within 7 days, please contact the clinic through [employer] or phone. If you have a critical or abnormal lab result, we will notify you by phone as soon as possible.  Submit refill requests through [employer] or call your pharmacy and they will forward the refill request to us. Please allow 3 business days for your refill to be completed.          Additional Information About Your Visit        MyChart Information     [employer] gives you secure access to your electronic health record. If you see a primary care provider, you can also send messages to your care team and make appointments. If you have questions, please call your primary care clinic.  If you do not have a primary care provider, please call [phone] and they will assist you.        Care EveryWhere ID     This  "is your Care EveryWhere ID. This could be used by other organizations to access your [place] medical records  [id number]        Your Vitals Were     Pulse Temperature Respirations Height Pulse Oximetry BMI (Body Mass Index)    80 98.6  F (37  C) (Tympanic) 18 5' 4.25\" (1.632 m) 100% 52.14 kg/m2       Blood Pressure from Last 3 Encounters:   03/16/18 148/80   02/05/18 (!) 160/92   12/18/17 (!) 160/98    Weight from Last 3 Encounters:   03/16/18 (!) 306 lb 2 oz (138.9 kg)   02/05/18 (!) 305 lb 12.8 oz (138.7 kg)   12/22/17 (!) 311 lb (141.1 kg)              Today, you had the following     No orders found for display       Primary Care Provider Office Phone # Fax #    James Paniagua [phone]       [address] [facility] [address]        Equal Access to Services     [facility] : Hadii aad ku hadasho Soomaali, waaxda luqadaha, qaybta kaalmada adeegyada, waxay barb bhagat . So [facility] [phone].    ATENCIÓN: Si habla español, tiene a l[place] disposición servicios gratuitos de asistencia lingüística. Llame al [phone].    We comply with applicable federal civil rights laws and [place] laws. We do not discriminate on the basis of race, color, national origin, age, disability, sex, sexual orientation, or gender identity.            Thank you!     Thank you for choosing [facility]  for your care. Our goal is always to provide you with excellent care. Hearing back from our patients is one way we can continue to improve our services. Please take a few minutes to complete the written survey that you may receive in the mail after your visit with us. Thank you!             Your Updated Medication List - Protect others around you: Learn how to safely use, store and throw away your medicines at www.disposemymeds.org.          This list is accurate as of 3/16/18 11:07 AM.  Always use your most recent med list.                   Brand Name Dispense Instructions for use " Diagnosis    aspirin 81 MG tablet     100    ONE DAILY    Essential hypertension, benign       * blood glucose monitoring lancets     3 Box    1 each 2 times daily    Type 2 diabetes mellitus (H)       * MICROLET LANCETS Misc     100 each    Use to test blood sugars 1 time daily or as directed    Type 2 diabetes mellitus without complication, without long-term current use of insulin (H)       blood glucose monitoring meter device kit     1 kit    Use to test blood sugars 1 times daily or as directed.    Type 2 diabetes mellitus without complication, without long-term current use of insulin (H)       * blood glucose monitoring test strip    ACCU-CHEK SMARTVIEW    100 each    1 strip by In Vitro route 2 times daily    Type 2 diabetes, HbA1c goal < 7% (H)       * blood glucose monitoring test strip    no brand specified    100 strip    Use to test blood sugars 1 times daily or as directed    Type 2 diabetes mellitus without complication, without long-term current use of insulin (H)       glipiZIDE 10 MG tablet    GLUCOTROL    180 tablet    Take 1 tablet (10 mg) by mouth 2 times daily (before meals)    Type 2 diabetes mellitus without complication, without long-term current use of insulin (H)       losartan 100 MG tablet    COZAAR    90 tablet    Take 1 tablet (100 mg) by mouth daily    Hypertension goal BP (blood pressure) < 140/90       MELATONIN PO           methocarbamol 750 MG tablet    ROBAXIN    60 tablet    Take 1 tablet (750 mg) by mouth every 6 hours as needed for muscle spasms (muscle spasm)    AC joint arthropathy, Post-op pain       minocycline 100 MG capsule    MINOCIN/DYNACIN    60 capsule    TAKE 1 CAPSULE BY MOUTH TWICE A DAY    Acne vulgaris       order for DME     1 Units    Equipment being ordered: glucose meter, Dexcom G5    Type 2 diabetes mellitus without complication, without long-term current use of insulin (H)       pioglitazone 30 MG tablet    ACTOS    90 tablet    Take 1 tablet (30 mg) by  mouth daily    Type 2 diabetes mellitus without complication, without long-term current use of insulin (H)       POTASSIUM GLUCONATE PO      Take 1 tablet by mouth daily        sildenafil 50 MG tablet    VIAGRA    12 tablet    Take 1 tablet (50 mg) by mouth daily as needed 30 min to 4 hrs before sex. Do not use with nitroglycerin, terazosin or doxazosin.    Vasculogenic erectile dysfunction, unspecified vasculogenic erectile dysfunction type       zinc 50 MG Tabs      Take 1 tablet by mouth daily        * Notice:  This list has 4 medication(s) that are the same as other medications prescribed for you. Read the directions carefully, and ask your doctor or other care provider to review them with you.

## 2018-03-16 NOTE — PROGRESS NOTES
SUBJECTIVE:   Waqas Allen is a 37 year old male who presents to clinic today for the following health issues:      New Patient/Transfer of Care        Patient was thinking about transferring care down the [place] until he heard that most of my practices obstetrics and pediatrics.  He would like to see internal medicine specialist if possible.  I did tell him there is an internal medicine specialist that goes more [place] and I would be happy to get him an appointment.  He did have some questions about his medications today.  I did go through his complete medication list with him and answer those questions.  He had some concerns about long-term use of some of the medications.  I did answer both questions for he and his wife today.  He had no other specific complaints concerns at this time just wanted to discuss transfer of care and now understands he can get his care in [place] where they live.    Assessment:  Morbid obesity  Type 2 diabetes without long-term insulin use  Hypertension    Plan:  We did get the patient a appointment with Dr. Trenton hampton on a time that will work for both he and his wife that she would like to meet the new doctor also.  They were very happy with the visit today and look forward to meeting Dr. Trent in [place]      Vignesh Ochoa MD

## 2018-03-16 NOTE — NURSING NOTE
"Chief Complaint   Patient presents with     Establish Care     Derm Problem     black spots on lower legs. Has had for many years. No pain, no itching.        Initial /80  Pulse 80  Temp 98.6  F (37  C) (Tympanic)  Resp 18  Ht 5' 4.25\" (1.632 m)  Wt (!) 306 lb 2 oz (138.9 kg)  SpO2 100%  BMI 52.14 kg/m2 Estimated body mass index is 52.14 kg/(m^2) as calculated from the following:    Height as of this encounter: 5' 4.25\" (1.632 m).    Weight as of this encounter: 306 lb 2 oz (138.9 kg).  Medication Reconciliation: complete   Health Maintenance Due   Topic Date Due     PNEUMOVAX 1X HI RISK PATIENT < 65 (NO IB MSG)  04/12/1983     TETANUS IMMUNIZATION (SYSTEM ASSIGNED)  03/22/2003     FOOT EXAM Q1 YEAR  01/09/2018     EYE EXAM Q1 YEAR  01/09/2018     LIPID MONITORING Q1 YEAR  01/09/2018     Poppy Leone, [facility]      "

## 2018-03-26 ENCOUNTER — OFFICE VISIT (OUTPATIENT)
Dept: FAMILY MEDICINE | Facility: OTHER | Age: 38
End: 2018-03-26
Payer: COMMERCIAL

## 2018-03-26 VITALS
TEMPERATURE: 98 F | SYSTOLIC BLOOD PRESSURE: 132 MMHG | DIASTOLIC BLOOD PRESSURE: 86 MMHG | OXYGEN SATURATION: 100 % | WEIGHT: 305 LBS | HEART RATE: 80 BPM | BODY MASS INDEX: 51.95 KG/M2

## 2018-03-26 DIAGNOSIS — E66.01 MORBID OBESITY (H): ICD-10-CM

## 2018-03-26 DIAGNOSIS — E78.5 HYPERLIPIDEMIA LDL GOAL <130: ICD-10-CM

## 2018-03-26 DIAGNOSIS — E11.9 TYPE 2 DIABETES MELLITUS WITHOUT COMPLICATION, WITHOUT LONG-TERM CURRENT USE OF INSULIN (H): Primary | ICD-10-CM

## 2018-03-26 DIAGNOSIS — I10 HYPERTENSION GOAL BP (BLOOD PRESSURE) < 140/90: ICD-10-CM

## 2018-03-26 LAB
ALBUMIN SERPL-MCNC: 3.7 G/DL (ref 3.4–5)
ALP SERPL-CCNC: 122 U/L (ref 40–150)
ALT SERPL W P-5'-P-CCNC: 30 U/L (ref 0–70)
ANION GAP SERPL CALCULATED.3IONS-SCNC: 9 MMOL/L (ref 3–14)
AST SERPL W P-5'-P-CCNC: 19 U/L (ref 0–45)
BILIRUB SERPL-MCNC: 0.6 MG/DL (ref 0.2–1.3)
BUN SERPL-MCNC: 18 MG/DL (ref 7–30)
CALCIUM SERPL-MCNC: 8.4 MG/DL (ref 8.5–10.1)
CHLORIDE SERPL-SCNC: 104 MMOL/L (ref 94–109)
CHOLEST SERPL-MCNC: 120 MG/DL
CO2 SERPL-SCNC: 27 MMOL/L (ref 20–32)
CREAT SERPL-MCNC: 0.92 MG/DL (ref 0.66–1.25)
CREAT UR-MCNC: 167 MG/DL
GFR SERPL CREATININE-BSD FRML MDRD: >90 ML/MIN/1.7M2
GLUCOSE SERPL-MCNC: 187 MG/DL (ref 70–99)
HBA1C MFR BLD: 8.6 % (ref 4.3–6)
HDLC SERPL-MCNC: 25 MG/DL
LDLC SERPL CALC-MCNC: 16 MG/DL
MICROALBUMIN UR-MCNC: 48 MG/L
MICROALBUMIN/CREAT UR: 28.8 MG/G CR (ref 0–17)
NONHDLC SERPL-MCNC: 95 MG/DL
POTASSIUM SERPL-SCNC: 4.3 MMOL/L (ref 3.4–5.3)
PROT SERPL-MCNC: 7.6 G/DL (ref 6.8–8.8)
SODIUM SERPL-SCNC: 140 MMOL/L (ref 133–144)
TRIGL SERPL-MCNC: 397 MG/DL

## 2018-03-26 PROCEDURE — 80053 COMPREHEN METABOLIC PANEL: CPT | Performed by: INTERNAL MEDICINE

## 2018-03-26 PROCEDURE — 36415 COLL VENOUS BLD VENIPUNCTURE: CPT | Performed by: INTERNAL MEDICINE

## 2018-03-26 PROCEDURE — 80061 LIPID PANEL: CPT | Performed by: INTERNAL MEDICINE

## 2018-03-26 PROCEDURE — 99213 OFFICE O/P EST LOW 20 MIN: CPT | Performed by: INTERNAL MEDICINE

## 2018-03-26 PROCEDURE — 82043 UR ALBUMIN QUANTITATIVE: CPT | Performed by: INTERNAL MEDICINE

## 2018-03-26 PROCEDURE — 83036 HEMOGLOBIN GLYCOSYLATED A1C: CPT | Performed by: INTERNAL MEDICINE

## 2018-03-26 ASSESSMENT — PAIN SCALES - GENERAL: PAINLEVEL: SEVERE PAIN (6)

## 2018-03-26 NOTE — NURSING NOTE
"Chief Complaint   Patient presents with     Establish Care       Initial /86 (BP Location: Left arm, Patient Position: Chair, Cuff Size: Adult Large)  Pulse 80  Temp 98  F (36.7  C) (Tympanic)  Wt 305 lb (138.3 kg)  SpO2 100%  BMI 51.95 kg/m2 Estimated body mass index is 51.95 kg/(m^2) as calculated from the following:    Height as of 3/16/18: 5' 4.25\" (1.632 m).    Weight as of this encounter: 305 lb (138.3 kg).  Medication Reconciliation: complete   Health Maintenance reviewed at today's visit patient asked to schedule/complete:   Immunizations:  Patient declined    Tyra PALMER    "

## 2018-03-26 NOTE — PROGRESS NOTES
SUBJECTIVE:   Waqas Allen is a 37 year old male who presents to clinic today for the following health issues:    New Patient/Transfer of Care                   Chief Complaint    The patient is a pleasant 37-year-old gentleman who presents today for follow-up of his diabetes.  He notes that he has been doing well.  He has been taking his medication compliantly.  He has no symptoms of hyper or hypoglycemia.  His last A1c was greater than 10.  We have discussed the need for improvement.  He states that he does watch his diet fairly well.  He does work very aggressively through the summer and spends most of the winter in a sedentary state, he notes this may not be favorably into his diabetic control.  He is having no problems with the medication.                         PAST, FAMILY,SOCIAL HISTORY:     Medical  History:   has a past medical history of Unspecified essential hypertension; Variants of migraine, not elsewhere classified, without mention of intractable migraine without mention of status migrainosus; and Varicella without mention of complication (1993).     Surgical History:   has a past surgical history that includes laminect/discectomy, lumbar (04/24/2007); Arthroscopy shoulder decompression (Left, 1/20/2016); Arthroscopy shoulder distal clavicle repair (Left, 1/20/2016); Arthroscopy shoulder distal clavicle repair (Right, 2/1/2017); and Arthroscopy shoulder decompression (Right, 2/1/2017).     Social History:   reports that he has never smoked. He has never used smokeless tobacco. He reports that he drinks about 0.6 - 1.2 oz of alcohol per week  He reports that he does not use illicit drugs.     Family History:  family history includes CEREBROVASCULAR DISEASE in his maternal grandfather; Hypertension in his brother; Unknown/Adopted in his father.            MEDICATIONS  Current Outpatient Prescriptions   Medication Sig Dispense Refill     blood glucose monitoring (NO BRAND SPECIFIED) test strip Use  to test blood sugars 1 times daily or as directed 100 strip 3     MICROLET LANCETS MISC Use to test blood sugars 1 time daily or as directed 100 each 3     MELATONIN PO        losartan (COZAAR) 100 MG tablet Take 1 tablet (100 mg) by mouth daily 90 tablet 1     pioglitazone (ACTOS) 30 MG tablet Take 1 tablet (30 mg) by mouth daily 90 tablet 1     minocycline (MINOCIN/DYNACIN) 100 MG capsule TAKE 1 CAPSULE BY MOUTH TWICE A DAY 60 capsule 11     order for DME Equipment being ordered: glucose meter, Dexcom G5 1 Units 1     methocarbamol (ROBAXIN) 750 MG tablet Take 1 tablet (750 mg) by mouth every 6 hours as needed for muscle spasms (muscle spasm) 60 tablet 0     sildenafil (VIAGRA) 50 MG tablet Take 1 tablet (50 mg) by mouth daily as needed 30 min to 4 hrs before sex. Do not use with nitroglycerin, terazosin or doxazosin. 12 tablet 11     glipiZIDE (GLUCOTROL) 10 MG tablet Take 1 tablet (10 mg) by mouth 2 times daily (before meals) 180 tablet 3     zinc 50 MG TABS Take 1 tablet by mouth daily       POTASSIUM GLUCONATE PO Take 1 tablet by mouth daily       ASPIRIN 81 MG OR TABS ONE DAILY 100 3         --------------------------------------------------------------------------------------------------------------------                              Review of Systems   LUNGS: Pt denies: cough,excess sputum, hemoptysis, or shortness of breath.   HEART: Pt denies: chest pain, arrythmia, syncope, tachy or bradyarrhythmia.   GI: Pt denies: nausea, vomitting, diarrhea, constipation, melena, or hematochezia.   NEURO: Pt denies: seizures, strokes, diplopia, weakness, paraesthesias, or paralysis.   SKIN: Pt denies: itching, rashes, discoloration, or specific lesions of concern. Denies recent hair loss.   PSYCH: The patient denies significant depression, anxiety, mood imbalance. Specifically denies any suicidal ideation.                                     Examination      /86 (BP Location: Left arm, Patient Position: Chair,  Cuff Size: Adult Large)  Pulse 80  Temp 98  F (36.7  C) (Tympanic)  Wt 305 lb (138.3 kg)  SpO2 100%  BMI 51.95 kg/m2   Constitutional: The patient appears to be in no acute distress. The patient appears to be adequately hydrated. No acute respiratory or hemodynamic distress is noted at this time.   LUNGS: clear bilaterally, airflow is brisk, no intercostal retraction or stridor is noted. No coughing is noted during visit.   HEART:  regular without rubs, clicks, gallops, or murmurs. PMI is nondisplaced. Upstrokes are brisk. S1,S2 are heard.   GI: Abdomen is soft, without rebound, guarding or tenderness. Bowel sounds are appropriate. No renal bruits are heard.  Abdomen is obese.   NEURO: Pt is alert and appropriate. No neurologic lateralization is noted. Cranial nerves 2-12 are intact. Peripheral sensory and motor function are grossly normal.                                           Decision Making    1. Type 2 diabetes mellitus without complication, without long-term current use of insulin (H)  Check A1c and lab work  Continue current medication and adjust based on results  - Hemoglobin A1c  - Comprehensive metabolic panel  - Albumin Random Urine Quantitative with Creat Ratio  Continue ARB, and statin and aspirin if tolerated  2. Hypertension goal BP (blood pressure) < 140/90  Currently controlled on current medication    3. Morbid obesity (H)  Recommend weight loss through caloric restriction    4. Hyperlipidemia LDL goal <130  Check lipid and adjust medication/add statin.  - Lipid Profile                            FOLLOW UP   I have asked the patient to make an appointment for followup with me as predicated by results.  Patient is very busy and has a difficult time getting into the doctor.        I have carefully explained the diagnosis and treatment options to the patient.  The patient has displayed an understanding of the above, and all subsequent questions were answered.      Chance Trent DO  FACOI    Portions of this note were produced using [employer]  Although every attempt at real-time proof reading has been made, occasional grammar/syntax errors may have been missed.

## 2018-03-26 NOTE — MR AVS SNAPSHOT
After Visit Summary   3/26/2018    Waqas Allen    MRN: [id number]           Patient Information     Date Of Birth          1980        Visit Information        Provider Department      3/26/2018 8:00 AM Chance Trent DO [facility]        Today's Diagnoses     Type 2 diabetes mellitus without complication, without long-term current use of insulin (H)    -  1    Hypertension goal BP (blood pressure) < 140/90        Morbid obesity (H)        Hyperlipidemia LDL goal <130           Follow-ups after your visit        Follow-up notes from your care team     Return in about 1 month (around 4/26/2018).      Who to contact     If you have questions or need follow up information about today's clinic visit or your schedule please contact [facility] directly at [phone].  Normal or non-critical lab and imaging results will be communicated to you by MyChart, letter or phone within 4 business days after the clinic has received the results. If you do not hear from us within 7 days, please contact the clinic through [employer] or phone. If you have a critical or abnormal lab result, we will notify you by phone as soon as possible.  Submit refill requests through [employer] or call your pharmacy and they will forward the refill request to us. Please allow 3 business days for your refill to be completed.          Additional Information About Your Visit        MyChart Information     [employer] gives you secure access to your electronic health record. If you see a primary care provider, you can also send messages to your care team and make appointments. If you have questions, please call your primary care clinic.  If you do not have a primary care provider, please call [phone] and they will assist you.        Care EveryWhere ID     This is your Care EveryWhere ID. This could be used by other organizations to access your [place] medical records  [id number]        Your  Vitals Were     Pulse Temperature Pulse Oximetry BMI (Body Mass Index)          80 98  F (36.7  C) (Tympanic) 100% 51.95 kg/m2         Blood Pressure from Last 3 Encounters:   03/26/18 132/86   03/16/18 148/80   02/05/18 (!) 160/92    Weight from Last 3 Encounters:   03/26/18 305 lb (138.3 kg)   03/16/18 (!) 306 lb 2 oz (138.9 kg)   02/05/18 (!) 305 lb 12.8 oz (138.7 kg)              We Performed the Following     Albumin Random Urine Quantitative with Creat Ratio     Comprehensive metabolic panel     Hemoglobin A1c     Lipid Profile        Primary Care Provider Office Phone # Fax #    James Paniagua [phone]       [address] [facility] [address]        Equal Access to Services     JAY MARTINES : Brianda mcghee Sodamaso, waaxda luqadaha, qaybta kaalmada adeegyareginald, luly bhagat . So [facility] [phone].    ATENCIÓN: Si habla español, tiene a [place] disposición servicios gratuitos de asistencia lingüística. Llame al [phone].    We comply with applicable federal civil rights laws and [place] laws. We do not discriminate on the basis of race, color, national origin, age, disability, sex, sexual orientation, or gender identity.            Thank you!     Thank you for choosing [facility]  for your care. Our goal is always to provide you with excellent care. Hearing back from our patients is one way we can continue to improve our services. Please take a few minutes to complete the written survey that you may receive in the mail after your visit with us. Thank you!             Your Updated Medication List - Protect others around you: Learn how to safely use, store and throw away your medicines at www.disposemymeds.org.          This list is accurate as of 3/26/18  8:48 AM.  Always use your most recent med list.                   Brand Name Dispense Instructions for use Diagnosis    aspirin 81 MG tablet     100    ONE DAILY    Essential hypertension, benign        blood glucose monitoring test strip    no brand specified    100 strip    Use to test blood sugars 1 times daily or as directed    Type 2 diabetes mellitus without complication, without long-term current use of insulin (H)       glipiZIDE 10 MG tablet    GLUCOTROL    180 tablet    Take 1 tablet (10 mg) by mouth 2 times daily (before meals)    Type 2 diabetes mellitus without complication, without long-term current use of insulin (H)       losartan 100 MG tablet    COZAAR    90 tablet    Take 1 tablet (100 mg) by mouth daily    Hypertension goal BP (blood pressure) < 140/90       MELATONIN PO           methocarbamol 750 MG tablet    ROBAXIN    60 tablet    Take 1 tablet (750 mg) by mouth every 6 hours as needed for muscle spasms (muscle spasm)    AC joint arthropathy, Post-op pain       MICROLET LANCETS Misc     100 each    Use to test blood sugars 1 time daily or as directed    Type 2 diabetes mellitus without complication, without long-term current use of insulin (H)       minocycline 100 MG capsule    MINOCIN/DYNACIN    60 capsule    TAKE 1 CAPSULE BY MOUTH TWICE A DAY    Acne vulgaris       order for DME     1 Units    Equipment being ordered: glucose meter, Dexcom G5    Type 2 diabetes mellitus without complication, without long-term current use of insulin (H)       pioglitazone 30 MG tablet    ACTOS    90 tablet    Take 1 tablet (30 mg) by mouth daily    Type 2 diabetes mellitus without complication, without long-term current use of insulin (H)       POTASSIUM GLUCONATE PO      Take 1 tablet by mouth daily        sildenafil 50 MG tablet    VIAGRA    12 tablet    Take 1 tablet (50 mg) by mouth daily as needed 30 min to 4 hrs before sex. Do not use with nitroglycerin, terazosin or doxazosin.    Vasculogenic erectile dysfunction, unspecified vasculogenic erectile dysfunction type       zinc 50 MG Tabs      Take 1 tablet by mouth daily

## 2018-03-27 RX ORDER — GLIPIZIDE 10 MG/1
TABLET ORAL
Qty: 270 TABLET | Refills: 1 | Status: SHIPPED | OUTPATIENT
Start: 2018-03-27 | End: 2018-09-08

## 2018-06-15 ENCOUNTER — TELEPHONE (OUTPATIENT)
Dept: FAMILY MEDICINE | Facility: OTHER | Age: 38
End: 2018-06-15

## 2018-06-15 NOTE — TELEPHONE ENCOUNTER
Panel Management Review      Patient has the following on his problem list:       Composite cancer screening  Chart review shows that this patient is due/due soon for the following   Summary:    Patient is due/failing the following:   Diabetic eye and foot exam and A1C    Action needed:   Patient needs non-fasting lab only appointment    Type of outreach:    Sent [employer] message.    Questions for provider review:    None                                                                                                                                    Loree ANGULO LPN       Chart routed to Loree ANGULO LPN   .

## 2018-06-15 NOTE — LETTER
[facility]  [address] [facility] [address] [phone]        Waqas Allen  [address] [facility] [address]      July 2, 2018      Dear Waqas,    I care about your health and have reviewed your health plan, including your medical conditions, medication list, and lab results and am making recommendations based on this review, to better manage your health.    You are in particular need of attention regarding:  -Labs    I am recommending that you:  - Schedule a lab appointment.    If you've had the preventative screening completed at another facility or feel you're not due for this screening, please call our clinic at the number listed above or send us a My Chart message so we can update our records. We would like to thank you in advance for taking the time to take care of your health.  If you have any questions, please don t hesitate to contact our clinic.    Sincerely,       Your [facility]

## 2018-07-06 ENCOUNTER — HOSPITAL ENCOUNTER (EMERGENCY)
Facility: CLINIC | Age: 38
Discharge: HOME | End: 2018-07-06
Attending: PHYSICIAN ASSISTANT | Admitting: PHYSICIAN ASSISTANT
Payer: COMMERCIAL

## 2018-07-06 VITALS
TEMPERATURE: 99.5 F | OXYGEN SATURATION: 100 % | BODY MASS INDEX: 55.01 KG/M2 | HEART RATE: 90 BPM | SYSTOLIC BLOOD PRESSURE: 168 MMHG | DIASTOLIC BLOOD PRESSURE: 111 MMHG | WEIGHT: 315 LBS | RESPIRATION RATE: 24 BRPM

## 2018-07-06 DIAGNOSIS — L02.214 ABSCESS OF GROIN, LEFT: ICD-10-CM

## 2018-07-06 PROCEDURE — 99284 EMERGENCY DEPT VISIT MOD MDM: CPT | Mod: 25 | Performed by: PHYSICIAN ASSISTANT

## 2018-07-06 PROCEDURE — 10060 I&D ABSCESS SIMPLE/SINGLE: CPT | Mod: Z6 | Performed by: PHYSICIAN ASSISTANT

## 2018-07-06 PROCEDURE — 99283 EMERGENCY DEPT VISIT LOW MDM: CPT | Mod: 25 | Performed by: PHYSICIAN ASSISTANT

## 2018-07-06 PROCEDURE — 10060 I&D ABSCESS SIMPLE/SINGLE: CPT | Performed by: PHYSICIAN ASSISTANT

## 2018-07-06 RX ORDER — SULFAMETHOXAZOLE/TRIMETHOPRIM 800-160 MG
1 TABLET ORAL 2 TIMES DAILY
Qty: 14 TABLET | Refills: 0 | Status: SHIPPED | OUTPATIENT
Start: 2018-07-06 | End: 2018-07-13

## 2018-07-06 NOTE — ED AVS SNAPSHOT
[facility] Emergency Department    [address]     [place] [address]    Phone:  [phone]    Fax:  [phone]                                       Waqas Allen   MRN: [id number]    Department:  [facility] Emergency Department   Date of Visit:  7/6/2018           Patient Information     Date Of Birth          1980        Your diagnoses for this visit were:     Abscess of groin, left        You were seen by Mounika Eastman PA-C.      Follow-up Information     Follow up with [facility] Emergency Department.    Specialty:  EMERGENCY MEDICINE    Why:  If symptoms worsen    Contact information:    [address]   [place] [address] [phone]    Additional information:    From [address]: Exit at [address] on south side of [place]. Turn right on [address]. Turn left at stoplight on [address]. [facility] will be in view two blocks ahead        Follow up with James Paniagua MD In 3 days.    Specialty:  Family Practice    Why:  For ER follow up, For wound re-check    Contact information:    [address] [facility] [address]  [phone]          Discharge Instructions         Keep the packing in for the next couple days. Schedule a follow-up visit for Monday to have the wound rechecked.  Take the Bactrim as prescribed for treatment of this infection.  Keep the wound covered with a clean dressing.  If you develop any worsening symptoms return to the emergency department.    Thank you for choosing [facility]'s Emergency Department. It was a pleasure taking care of you today. If you have any questions, please call [phone].    Mounika Eastman PA-C      Abscess (Incision & Drainage)  An abscess is sometimes called a boil. It happens when bacteria get trapped under the skin and start to grow. Pus forms inside the abscess as the body responds to the bacteria. An abscess can happen with an insect bite, ingrown hair,  blocked oil gland, pimple, cyst, or puncture wound.  Your healthcare provider has drained the pus from your abscess. If the abscess pocket was large, your healthcare provider may have put in gauze packing. Your provider will need to remove it on your next visit. He or she may also replace it at that time. You may not need antibiotics to treat a simple abscess, unless the infection is spreading into the skin around the wound (cellulitis).  The wound will take about 1 to 2 weeks to heal, depending on the size of the abscess. Healthy tissue will grow from the bottom and sides of the opening until it seals over.  Home care  These tips can help your wound heal:    The wound may drain for the first 2 days. Cover the wound with a clean dry dressing. Change the dressing if it becomes soaked with blood or pus.    If a gauze packing was placed inside the abscess pocket, you may be told to remove it yourself. You may do this in the shower. Once the packing is removed, you should wash the area in the shower, or clean the area as directed by your provider. Continue to do this until the skin opening has closed. Make sure you wash your hands after changing the packing or cleaning the wound.    If you were prescribed antibiotics, take them as directed until they are all gone.    You may use acetaminophen or ibuprofen to control pain, unless another pain medicine was prescribed. If you have liver disease or ever had a stomach ulcer, talk with your doctor before using these medicines.  Follow-up care  Follow up with your healthcare provider, or as advised. If a gauze packing was put in your wound, it should be removed in 1 to 2 days. Check your wound every day for any signs that the infection is getting worse. The signs are listed below.  When to seek medical advice  Call your healthcare provider right away if any of these occur:    Increasing redness or swelling    Red streaks in the skin leading away from the wound    Increasing  local pain or swelling    Continued pus draining from the wound 2 days after treatment    Fever of 100.4 F (38 C) or higher, or as directed by your healthcare provider    Boil returns when you are at home      24 Hour Appointment Hotline       To make an appointment at any [facility] clinic, call [phone] ([phone]). If you don't have a family doctor or clinic, we will help you find one. [facility] clinics are conveniently located to serve the needs of you and your family.             Review of your medicines      START taking        Dose / Directions Last dose taken    sulfamethoxazole-trimethoprim 800-160 MG per tablet   Commonly known as:  BACTRIM DS   Dose:  1 tablet   Quantity:  14 tablet        Take 1 tablet by mouth 2 times daily for 7 days   Refills:  0          Our records show that you are taking the medicines listed below. If these are incorrect, please call your family doctor or clinic.        Dose / Directions Last dose taken    aspirin 81 MG tablet   Quantity:  100        ONE DAILY   Refills:  3        blood glucose monitoring test strip   Commonly known as:  no brand specified   Quantity:  100 strip        Use to test blood sugars 1 times daily or as directed   Refills:  3        glipiZIDE 10 MG tablet   Commonly known as:  GLUCOTROL   Quantity:  270 tablet        2 tablets in AM and 1 tablet in PM   Refills:  1        losartan 100 MG tablet   Commonly known as:  COZAAR   Dose:  100 mg   Quantity:  90 tablet        Take 1 tablet (100 mg) by mouth daily   Refills:  1        MELATONIN PO        Refills:  0        methocarbamol 750 MG tablet   Commonly known as:  ROBAXIN   Dose:  750 mg   Quantity:  60 tablet        Take 1 tablet (750 mg) by mouth every 6 hours as needed for muscle spasms (muscle spasm)   Refills:  0        MICROLET LANCETS Misc   Quantity:  100 each        Use to test blood sugars 1 time daily or as directed   Refills:  3        minocycline 100 MG capsule   Commonly known as:   MINOCIN/DYNACIN   Quantity:  60 capsule        TAKE 1 CAPSULE BY MOUTH TWICE A DAY   Refills:  11        order for DME   Quantity:  1 Units        Equipment being ordered: glucose meter, Dexcom G5   Refills:  1        pioglitazone 30 MG tablet   Commonly known as:  ACTOS   Dose:  30 mg   Quantity:  90 tablet        Take 1 tablet (30 mg) by mouth daily   Refills:  1        POTASSIUM GLUCONATE PO   Dose:  1 tablet        Take 1 tablet by mouth daily   Refills:  0        sildenafil 50 MG tablet   Commonly known as:  VIAGRA   Dose:  50 mg   Quantity:  12 tablet        Take 1 tablet (50 mg) by mouth daily as needed 30 min to 4 hrs before sex. Do not use with nitroglycerin, terazosin or doxazosin.   Refills:  11        zinc 50 MG Tabs   Dose:  1 tablet        Take 1 tablet by mouth daily   Refills:  0                Prescriptions were sent or printed at these locations (1 Prescription)                   [facility] - [address] [address]    Telephone:  [phone]   Fax:  [phone]   Hours:                  E-Prescribed (1 of 1)         sulfamethoxazole-trimethoprim (BACTRIM DS) 800-160 MG per tablet                Orders Needing Specimen Collection     None      Pending Results     No orders found from 7/4/2018 to 7/7/2018.            Pending Culture Results     No orders found from 7/4/2018 to 7/7/2018.            Pending Results Instructions     If you had any lab results that were not finalized at the time of your Discharge, you can call the ED Lab Result RN at [phone]. You will be contacted by this team for any positive Lab results or changes in treatment. The nurses are available 7 days a week from 10A to 6:30P.  You can leave a message 24 hours per day and they will return your call.        Thank you for choosing [facility]       Thank you for choosing [facility] for your care. Our goal is always to provide you with excellent care.  Hearing back from our patients is one way we can continue to improve our services. Please take a few minutes to complete the written survey that you may receive in the mail after you visit with us. Thank you!        [employer] Information     [employer] gives you secure access to your electronic health record. If you see a primary care provider, you can also send messages to your care team and make appointments. If you have questions, please call your primary care clinic.  If you do not have a primary care provider, please call [phone] and they will assist you.        Care EveryWhere ID     This is your Care EveryWhere ID. This could be used by other organizations to access your [facility] medical records  [id number]        Equal Access to Services     JAY MARTINES : Brianda Mcnally, swapnil washington, xiomy broussard, luly armendariz. So [facility] [phone].    ATENCIÓN: Si habla español, tiene a lancaster disposición servicios gratuitos de asistencia lingüística. Llame al [phone].    We comply with applicable federal civil rights laws and [place] laws. We do not discriminate on the basis of race, color, national origin, age, disability, sex, sexual orientation, or gender identity.            After Visit Summary       This is your record. Keep this with you and show to your community pharmacist(s) and doctor(s) at your next visit.

## 2018-07-06 NOTE — ED AVS SNAPSHOT
[facility] Emergency Department    [address] [place] [address]    Phone:  [phone]    Fax:  [phone]                                       Waqas Allen   MRN: [id number]    Department:  [facility] Emergency Department   Date of Visit:  7/6/2018           After Visit Summary Signature Page     I have received my discharge instructions, and my questions have been answered. I have discussed any challenges I see with this plan with the nurse or doctor.    ..........................................................................................................................................  Patient/Patient Representative Signature      ..........................................................................................................................................  Patient Representative Print Name and Relationship to Patient    ..................................................               ................................................  Date                                            Time    ..........................................................................................................................................  Reviewed by Signature/Title    ...................................................              ..............................................  Date                                                            Time

## 2018-07-07 NOTE — DISCHARGE INSTRUCTIONS
Keep the packing in for the next couple days. Schedule a follow-up visit for Monday to have the wound rechecked.  Take the Bactrim as prescribed for treatment of this infection.  Keep the wound covered with a clean dressing.  If you develop any worsening symptoms return to the emergency department.    Thank you for choosing [facility] Emergency Department. It was a pleasure taking care of you today. If you have any questions, please call [phone].    Mounika Eastman PA-C      Abscess (Incision & Drainage)  An abscess is sometimes called a boil. It happens when bacteria get trapped under the skin and start to grow. Pus forms inside the abscess as the body responds to the bacteria. An abscess can happen with an insect bite, ingrown hair, blocked oil gland, pimple, cyst, or puncture wound.  Your healthcare provider has drained the pus from your abscess. If the abscess pocket was large, your healthcare provider may have put in gauze packing. Your provider will need to remove it on your next visit. He or she may also replace it at that time. You may not need antibiotics to treat a simple abscess, unless the infection is spreading into the skin around the wound (cellulitis).  The wound will take about 1 to 2 weeks to heal, depending on the size of the abscess. Healthy tissue will grow from the bottom and sides of the opening until it seals over.  Home care  These tips can help your wound heal:    The wound may drain for the first 2 days. Cover the wound with a clean dry dressing. Change the dressing if it becomes soaked with blood or pus.    If a gauze packing was placed inside the abscess pocket, you may be told to remove it yourself. You may do this in the shower. Once the packing is removed, you should wash the area in the shower, or clean the area as directed by your provider. Continue to do this until the skin opening has closed. Make sure you wash your hands after changing the packing or cleaning  the wound.    If you were prescribed antibiotics, take them as directed until they are all gone.    You may use acetaminophen or ibuprofen to control pain, unless another pain medicine was prescribed. If you have liver disease or ever had a stomach ulcer, talk with your doctor before using these medicines.  Follow-up care  Follow up with your healthcare provider, or as advised. If a gauze packing was put in your wound, it should be removed in 1 to 2 days. Check your wound every day for any signs that the infection is getting worse. The signs are listed below.  When to seek medical advice  Call your healthcare provider right away if any of these occur:    Increasing redness or swelling    Red streaks in the skin leading away from the wound    Increasing local pain or swelling    Continued pus draining from the wound 2 days after treatment    Fever of 100.4 F (38 C) or higher, or as directed by your healthcare provider    Boil returns when you are at home

## 2018-07-07 NOTE — ED PROVIDER NOTES
History     Chief Complaint   Patient presents with     Wound Infection     HPI  Waqas Allen is a 38 year old male who presents to the emergency department for concerns of an infection in his groin.  The patient reports he noticed swelling in his left groin a couple days ago but tonight it acutely worsened.  He has a history of abscesses that have required drainage.  He states last night he had difficulty sleeping from the pain.  He has not taken anything for it.  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  He denies fever, chills, body aches.  No drainage from the wound.    Problem List:    Patient Active Problem List    Diagnosis Date Noted     Morbid obesity (H) 02/05/2018     Priority: Medium     AC joint arthropathy 01/06/2017     Priority: Medium     Type 2 diabetes mellitus without complication, without long-term current use of insulin (H) 12/15/2015     Priority: Medium     Sprain of acromioclavicular ligament 11/25/2014     Priority: Medium     Problem list name updated by automated process. Provider to review       Hypertension goal BP (blood pressure) < 140/90 10/02/2011     Priority: Medium     Acne 06/09/2010     Priority: Medium        Past Medical History:    Past Medical History:   Diagnosis Date     Unspecified essential hypertension      Variants of migraine, not elsewhere classified, without mention of intractable migraine without mention of status migrainosus      Varicella without mention of complication 1993       Past Surgical History:    Past Surgical History:   Procedure Laterality Date     ARTHROSCOPY SHOULDER DECOMPRESSION Left 1/20/2016    Procedure: ARTHROSCOPY SHOULDER DECOMPRESSION;  Surgeon: Watson Duckworth MD;  Location: [facility] OR     ARTHROSCOPY SHOULDER DECOMPRESSION Right 2/1/2017    Procedure: ARTHROSCOPY SHOULDER DECOMPRESSION;  Surgeon: Watson Duckworth MD;  Location: [facility]     ARTHROSCOPY SHOULDER DISTAL CLAVICLE REPAIR Left 1/20/2016    Procedure: ARTHROSCOPY  SHOULDER DISTAL CLAVICLE RESECTION;  Surgeon: Watson Duckworth MD;  Location: [facility] OR     ARTHROSCOPY SHOULDER DISTAL CLAVICLE REPAIR Right 2/1/2017    Procedure: ARTHROSCOPY SHOULDER DISTAL CLAVICLE RESECTION;  Surgeon: Watson Duckworth MD;  Location: [facility] OR     LAMINECT/DISCECTOMY, LUMBAR  04/24/2007    Left L3-L4 Hemilaminectomy/Microdiskectomy.  [facility]       Family History:    Family History   Problem Relation Age of Onset     Hypertension Brother      Cerebrovascular Disease Maternal Grandfather      Unknown/Adopted Father        Social History:  Marital Status:  Single [1]  Social History   Substance Use Topics     Smoking status: Never Smoker     Smokeless tobacco: Never Used     Alcohol use 0.6 - 1.2 oz/week     1 - 2 Standard drinks or equivalent per week      Comment: occ        Medications:      sulfamethoxazole-trimethoprim (BACTRIM DS) 800-160 MG per tablet   ASPIRIN 81 MG OR TABS   blood glucose monitoring (NO BRAND SPECIFIED) test strip   glipiZIDE (GLUCOTROL) 10 MG tablet   losartan (COZAAR) 100 MG tablet   MELATONIN PO   methocarbamol (ROBAXIN) 750 MG tablet   MICROLET LANCETS MISC   minocycline (MINOCIN/DYNACIN) 100 MG capsule   order for DME   pioglitazone (ACTOS) 30 MG tablet   POTASSIUM GLUCONATE PO   sildenafil (VIAGRA) 50 MG tablet   zinc 50 MG TABS         Review of Systems   All other systems reviewed and are negative.      Physical Exam   BP: (!) 168/111  Pulse: 90  Temp: 99.5  F (37.5  C)  Resp: 24  Weight: 146.5 kg (323 lb)  SpO2: 100 %      Physical Exam   Constitutional: He is oriented to person, place, and time. He appears well-developed and well-nourished. No distress.   Morbidly obese male   HENT:   Head: Normocephalic and atraumatic.   Eyes: Conjunctivae are normal.   Neck: Neck supple.   Cardiovascular: Intact distal pulses.    Pulmonary/Chest: Effort normal. No respiratory distress.   Genitourinary:   Genitourinary Comments: Left anterior groin region with 3 x 4 cm  area of fluctuance with surrounding induration, erythema, and warmth, consistent with abscess.  Tender to touch throughout area.   Neurological: He is alert and oriented to person, place, and time.   Skin: Skin is warm and dry. He is not diaphoretic.   Psychiatric: He has a normal mood and affect.   Nursing note and vitals reviewed.      ED Course     ED Course     Incision + drainage  Date/Time: 7/6/2018 9:07 PM  Performed by: JENNIFER FALK  Authorized by: JENNIFER FALK     Consent:     Consent obtained:  Verbal    Consent given by:  Patient    Risks discussed:  Bleeding, infection and pain    Alternatives discussed:  Alternative treatment  Location:     Type:  Abscess    Location:  Lower extremity    Lower extremity location:  Leg    Leg location:  L upper leg  Anesthesia (see MAR for exact dosages):     Anesthesia method:  Local infiltration    Local anesthetic:  Lidocaine 1% w/o epi  Procedure type:     Complexity:  Simple  Procedure details:     Incision types:  Stab incision    Incision depth:  Dermal    Scalpel blade:  11    Drainage:  Bloody and purulent    Drainage amount:  Copious    Packing materials:  1/4 in iodoform gauze  Post-procedure details:     Patient tolerance of procedure:  Tolerated well, no immediate complications        No results found for this or any previous visit (from the past 24 hour(s)).    Medications - No data to display    Assessments & Plan (with Medical Decision Making)  Waqas Allen is a 38 year old male who presented to the ED for concerns of left groin swelling and pain. He was afebrile on arrival with a blood pressure of 168/111. In his left anterior groin he was noted to have a 3 x 4 cm area of fluctuance with surrounding air in duration, erythema, and warmth, consistent with abscess and surrounding cellulitis.  Patient was agreeable to undergoing I&D for treatment.  This was performed as detailed above and he tolerated this well.  It was packed due to  the size and I advised to leave this in for the next couple days.  I went over wound cares advise use of ibuprofen or Tylenol for discomfort.  He was given prescription of Bactrim for treatment of surrounding cellulitis.  He should follow-up in the clinic on Monday for wound check.  He was provided instructions on when to return to the ED.  All questions answered and patient agreeable to plan.  Discharged home in suitable condition.     I have reviewed the nursing notes.    I have reviewed the findings, diagnosis, plan and need for follow up with the patient.    Discharge Medication List as of 7/6/2018  9:03 PM      START taking these medications    Details   sulfamethoxazole-trimethoprim (BACTRIM DS) 800-160 MG per tablet Take 1 tablet by mouth 2 times daily for 7 days, Disp-14 tablet, R-0, E-Prescribe             Final diagnoses:   Abscess of groin, left     Note: Chart documentation done in part with Dragon Voice Recognition software. Although reviewed after completion, some word and grammatical errors may remain.      7/6/2018   [facility] EMERGENCY DEPARTMENT     Mounkia Eastman PA-C  07/06/18 1277

## 2018-08-19 DIAGNOSIS — I10 HYPERTENSION GOAL BP (BLOOD PRESSURE) < 140/90: ICD-10-CM

## 2018-08-20 NOTE — TELEPHONE ENCOUNTER
"Requested Prescriptions   Pending Prescriptions Disp Refills     losartan (COZAAR) 100 MG tablet [Pharmacy Med Name: LOSARTAN 100MG TABLET] 90 tablet     Last Written Prescription Date:  2/05/2018  Last Fill Quantity: 90,  # refills: 1   Last office visit: 3/26/2018 with prescribing provider:  Dr. Trent   Future Office Visit:     Sig: TAKE 1 TABLET BY MOUTH EVERY DAY    Angiotensin-II Receptors Failed    8/19/2018  5:04 AM       Failed - Blood pressure under 140/90 in past 12 months    BP Readings from Last 3 Encounters:   07/06/18 (!) 168/111   03/26/18 132/86   03/16/18 148/80                Passed - Recent (12 mo) or future (30 days) visit within the authorizing provider's specialty    Patient had office visit in the last 12 months or has a visit in the next 30 days with authorizing provider or within the authorizing provider's specialty.  See \"Patient Info\" tab in inbasket, or \"Choose Columns\" in Meds & Orders section of the refill encounter.           Passed - Patient is age 18 or older       Passed - Normal serum creatinine on file in past 12 months    Recent Labs   Lab Test  03/26/18   0840   CR  0.92            Passed - Normal serum potassium on file in past 12 months    Recent Labs   Lab Test  03/26/18   0840   POTASSIUM  4.3                      "

## 2018-08-21 RX ORDER — LOSARTAN POTASSIUM 100 MG/1
TABLET ORAL
Qty: 90 TABLET | Refills: 1 | Status: SHIPPED | OUTPATIENT
Start: 2018-08-21 | End: 2019-02-06

## 2018-08-21 NOTE — TELEPHONE ENCOUNTER
Routing refill request to provider for review/approval because:  Labs out of range:  BP    MARCIO WalshN, RN  [facility]

## 2018-09-08 DIAGNOSIS — E11.9 TYPE 2 DIABETES MELLITUS WITHOUT COMPLICATION, WITHOUT LONG-TERM CURRENT USE OF INSULIN (H): ICD-10-CM

## 2018-09-10 NOTE — TELEPHONE ENCOUNTER
"Requested Prescriptions   Pending Prescriptions Disp Refills     glipiZIDE (GLUCOTROL) 10 MG tablet [Pharmacy Med Name: GLIPIZIDE 10MG TAB] 270 tablet     Last Written Prescription Date:  3/27/2018  Last Fill Quantity: 270,  # refills: 1   Last office visit: 3/26/2018 with prescribing provider:  Dr. Trent   Future Office Visit:     Sig: TAKE 2 TABLETS (10MG) BY MOUTH IN THE A.M. AND TAKE 1 TABLET (10MG)EVERY P.M.    Sulfonylurea Agents Failed    9/8/2018  1:02 AM       Failed - Blood pressure less than 140/90 in past 6 months    BP Readings from Last 3 Encounters:   07/06/18 (!) 168/111   03/26/18 132/86   03/16/18 148/80                Failed - Patient has documented A1c within the specified period of time.    If HgbA1C is 8 or greater, it needs to be on file within the past 3 months.  If less than 8, must be on file within the past 6 months.     Recent Labs   Lab Test  03/26/18   0840   A1C  8.6*            Passed - Patient has documented LDL within the past 12 mos.    Recent Labs   Lab Test  03/26/18   0840   LDL  16            Passed - Patient has had a Microalbumin in the past 12 mos.    Recent Labs   Lab Test  03/26/18   0850   MICROL  48   UMALCR  28.80*            Passed - Patient is age 18 or older       Passed - Patient has a recent creatinine (normal) within the past 12 mos.    Recent Labs   Lab Test  03/26/18   0840   CR  0.92            Passed - Recent (6 mo) or future (30 days) visit within the authorizing provider's specialty    Patient had office visit in the last 6 months or has a visit in the next 30 days with authorizing provider or within the authorizing provider's specialty.  See \"Patient Info\" tab in inbasket, or \"Choose Columns\" in Meds & Orders section of the refill encounter.              "

## 2018-09-11 RX ORDER — GLIPIZIDE 10 MG/1
TABLET ORAL
Qty: 270 TABLET | Refills: 0 | Status: SHIPPED | OUTPATIENT
Start: 2018-09-11 | End: 2018-12-07

## 2018-09-11 NOTE — TELEPHONE ENCOUNTER
Routing refill request to provider for review/approval because:  Labs out of range:  BP, Microalbumin  Labs not current:  A1C    EVELYN Walsh, RN  [facility]

## 2018-10-08 DIAGNOSIS — E11.9 TYPE 2 DIABETES MELLITUS WITHOUT COMPLICATION, WITHOUT LONG-TERM CURRENT USE OF INSULIN (H): ICD-10-CM

## 2018-10-09 RX ORDER — PIOGLITAZONEHYDROCHLORIDE 30 MG/1
TABLET ORAL
Qty: 90 TABLET | Refills: 0 | Status: SHIPPED | OUTPATIENT
Start: 2018-10-09 | End: 2019-01-07

## 2018-12-04 ENCOUNTER — HOSPITAL ENCOUNTER (EMERGENCY)
Facility: CLINIC | Age: 38
Discharge: HOME | End: 2018-12-04
Attending: PHYSICIAN ASSISTANT | Admitting: PHYSICIAN ASSISTANT
Payer: COMMERCIAL

## 2018-12-04 VITALS
RESPIRATION RATE: 20 BRPM | HEART RATE: 103 BPM | OXYGEN SATURATION: 99 % | BODY MASS INDEX: 50.82 KG/M2 | TEMPERATURE: 98.2 F | SYSTOLIC BLOOD PRESSURE: 163 MMHG | DIASTOLIC BLOOD PRESSURE: 96 MMHG | HEIGHT: 65 IN | WEIGHT: 305 LBS

## 2018-12-04 DIAGNOSIS — K42.9 UMBILICAL HERNIA WITHOUT OBSTRUCTION AND WITHOUT GANGRENE: ICD-10-CM

## 2018-12-04 PROCEDURE — 99284 EMERGENCY DEPT VISIT MOD MDM: CPT | Mod: Z6 | Performed by: PHYSICIAN ASSISTANT

## 2018-12-04 PROCEDURE — 99282 EMERGENCY DEPT VISIT SF MDM: CPT | Performed by: PHYSICIAN ASSISTANT

## 2018-12-04 NOTE — ED TRIAGE NOTES
Patient presents with concerns for mid abdominal pain that radiates into chest worse all day today. He has a known umbilical hernia. Sumaya Allen RN

## 2018-12-04 NOTE — ED PROVIDER NOTES
History     Chief Complaint   Patient presents with     Abdominal Pain     The history is provided by the patient.     Waqas Allen is a 38 year old male who presents to the emergency department for abdominal pain. Patient reports having middle abdominal pain at his belly button that radiates upward about 6 inches. He states he has had this pain for 1-2 years, was diagnosed with an umbilical hernia. Patient reports when laying on his stomach or lifting anything heavy he has increased pain and nausea. He states he was working on a deck earlier today, laying on his stomach and felt nauseous all day. He states he has the pain most days, he states today is at a 7/10. Patient reports his hernia is getting more painful and annoying, he was trying to hold off until 3/19 to have surgery. Patient states he is able eat, having normal bowel movements (denies any blood), and denies any urinary symptoms.    Problem List:    Patient Active Problem List    Diagnosis Date Noted     Morbid obesity (H) 02/05/2018     Priority: Medium     AC joint arthropathy 01/06/2017     Priority: Medium     Type 2 diabetes mellitus without complication, without long-term current use of insulin (H) 12/15/2015     Priority: Medium     Sprain of acromioclavicular ligament 11/25/2014     Priority: Medium     Problem list name updated by automated process. Provider to review       Hypertension goal BP (blood pressure) < 140/90 10/02/2011     Priority: Medium     Acne 06/09/2010     Priority: Medium        Past Medical History:    Past Medical History:   Diagnosis Date     Unspecified essential hypertension      Variants of migraine, not elsewhere classified, without mention of intractable migraine without mention of status migrainosus      Varicella without mention of complication 1993       Past Surgical History:    Past Surgical History:   Procedure Laterality Date     ARTHROSCOPY SHOULDER DECOMPRESSION Left 1/20/2016    Procedure: ARTHROSCOPY  "SHOULDER DECOMPRESSION;  Surgeon: Watson Duckworth MD;  Location: [facility]     ARTHROSCOPY SHOULDER DECOMPRESSION Right 2/1/2017    Procedure: ARTHROSCOPY SHOULDER DECOMPRESSION;  Surgeon: Watson Duckworth MD;  Location: [facility]     ARTHROSCOPY SHOULDER DISTAL CLAVICLE REPAIR Left 1/20/2016    Procedure: ARTHROSCOPY SHOULDER DISTAL CLAVICLE RESECTION;  Surgeon: Watson Duckworth MD;  Location: [facility]     ARTHROSCOPY SHOULDER DISTAL CLAVICLE REPAIR Right 2/1/2017    Procedure: ARTHROSCOPY SHOULDER DISTAL CLAVICLE RESECTION;  Surgeon: Watson Duckworth MD;  Location: [facility]     LAMINECT/DISCECTOMY, LUMBAR  04/24/2007    Left L3-L4 Hemilaminectomy/Microdiskectomy.  [facility]       Family History:    Family History   Problem Relation Age of Onset     Hypertension Brother      Cerebrovascular Disease Maternal Grandfather      Unknown/Adopted Father        Social History:  Marital Status:  Single [1]  Social History   Substance Use Topics     Smoking status: Never Smoker     Smokeless tobacco: Never Used     Alcohol use 0.6 - 1.2 oz/week     1 - 2 Standard drinks or equivalent per week      Comment: occ        Medications:      ASPIRIN 81 MG OR TABS   blood glucose monitoring (NO BRAND SPECIFIED) test strip   glipiZIDE (GLUCOTROL) 10 MG tablet   losartan (COZAAR) 100 MG tablet   MELATONIN PO   methocarbamol (ROBAXIN) 750 MG tablet   MICROLET LANCETS MISC   minocycline (MINOCIN/DYNACIN) 100 MG capsule   order for DME   pioglitazone (ACTOS) 30 MG tablet   POTASSIUM GLUCONATE PO   sildenafil (VIAGRA) 50 MG tablet   zinc 50 MG TABS         Review of Systems   All other systems reviewed and are negative.      Physical Exam   BP: (!) 141/98 (R) lower forearm, regular cuff)  Pulse: 110  Temp: 98.2  F (36.8  C)  Resp: 20  Height: 165.1 cm (5' 5\")  Weight: 138.3 kg (305 lb)  SpO2: 100 %      Physical Exam   Constitutional: He is oriented to person, place, and time. He appears well-developed and well-nourished. No " distress.   HENT:   Head: Normocephalic and atraumatic.   Right Ear: External ear normal.   Left Ear: External ear normal.   Nose: Nose normal.   Mouth/Throat: Oropharynx is clear and moist. No oropharyngeal exudate.   Eyes: Conjunctivae are normal. Pupils are equal, round, and reactive to light. Right eye exhibits no discharge. Left eye exhibits no discharge. No scleral icterus.   Neck: Normal range of motion. Neck supple. No thyromegaly present.   Cardiovascular: Normal rate, regular rhythm, normal heart sounds and intact distal pulses.    No murmur heard.  Pulmonary/Chest: Effort normal and breath sounds normal. No respiratory distress. He has no wheezes. He has no rales.   Abdominal: Soft. Bowel sounds are normal. He exhibits no distension and no mass. There is tenderness (Tender to palpation over the large grape-sized umbilical hernia which can be reduced with some very mild discomfort.. No other tenderness to palpation throughout the abdomen.). There is no rebound and no guarding.   Musculoskeletal: Normal range of motion. He exhibits no edema or tenderness.   Lymphadenopathy:     He has no cervical adenopathy.   Neurological: He is alert and oriented to person, place, and time.   Skin: Skin is warm and dry. No rash noted. He is not diaphoretic. No erythema. No pallor.   Psychiatric: He has a normal mood and affect. His behavior is normal. Judgment normal.   Nursing note and vitals reviewed.      ED Course     ED Course     Procedures               Critical Care time:  none               No results found for this or any previous visit (from the past 24 hour(s)).    Medications - No data to display    Assessments & Plan (with Medical Decision Making)  Umbilical hernia without obstruction and without gangrene     38 year old male presents for evaluation of progressively worsening umbilical discomfort primarily with pressure over the area, laying prone, or lifting things.  He has been aware of an umbilical  hernia, but states that it is getting bigger.  Denies any trauma to the abdomen.  Denies any other bowel or  issues.  No fevers or chills.  No nausea, vomiting, diarrhea, or constipation.  He does have bowel movements 1-3 times per day, and this has been normal for him without change.  On exam blood pressure 163/96, pulse 103, temperature 98.2.  Morbidly obese male in no acute distress.  Grape sized umbilical hernia at the 11 o'clock position reduced in the supine position without complication.  It does come out immediately upon any type of movement or Valsalva maneuver.  This re-creates the discomfort that he is experiencing.  We will set him up with general surgery for consultation.  Appointment was set up for him.  Avoid any lifting over 5 pounds to limit his symptoms.  Tylenol as needed for discomfort.  Return to the ED prior to his consultation if symptoms worsen, especially if he is unable to reduce the hernia.  Patient was in agreement with this plan and was suitable for discharge.     I have reviewed the nursing notes.    I have reviewed the findings, diagnosis, plan and need for follow up with the patient.       Discharge Medication List as of 12/4/2018  3:28 PM          Final diagnoses:   Umbilical hernia without obstruction and without gangrene     This document serves as a record of services personally performed by Tunde Ji PA-C. It was created on their behalf by Sury Ascencio, a trained medical scribe. The creation of this record is based on the provider's personal observations and the statements of the patient. This document has been checked and approved by the attending provider.    Note: Chart documentation done in part with Dragon Voice Recognition software. Although reviewed after completion, some word and grammatical errors may remain.    12/4/2018   Tunde Ji PA-C   [facility] EMERGENCY DEPARTMENT     Tunde Ji PA-C  12/04/18 2428

## 2018-12-04 NOTE — ED AVS SNAPSHOT
[facility] Emergency Department    [address] [place] [address]    Phone:  [phone]    Fax:  [phone]                                       Waqas Allen   MRN: [id number]    Department:  [facility] Emergency Department   Date of Visit:  12/4/2018           Patient Information     Date Of Birth          1980        Your diagnoses for this visit were:     Umbilical hernia without obstruction and without gangrene        You were seen by Tunde Ji PA-C.      Follow-up Information     Go to Manas Teresa DO.    Specialty:  Surgery    Why:  For hernia repair consultation    Contact information:    [address]   [place] [address] [phone]        Discharge References/Attachments     HERNIA, WHAT IS A (ENGLISH)      24 Hour Appointment Hotline       To make an appointment at any [facility] clinic, call [phone] ([phone]). If you don't have a family doctor or clinic, we will help you find one. [facility] clinics are conveniently located to serve the needs of you and your family.          ED Discharge Orders     GENERAL SURG ADULT REFERRAL       Your provider has referred you to: [facility]: [facility] [phone]   [email or web address]    Please be aware that coverage of these services is subject to the terms and limitations of your health insurance plan.  Call member services at your health plan with any benefit or coverage questions.      Please bring the following with you to your appointment:    (1) Any X-Rays, CTs or MRIs which have been performed.  Contact the facility where they were done to arrange for  prior to your scheduled appointment.   (2) List of current medications   (3) This referral request   (4) Any documents/labs given to you for this referral                     Review of your medicines      Our records show that you  are taking the medicines listed below. If these are incorrect, please call your family doctor or clinic.        Dose / Directions Last dose taken    aspirin 81 MG tablet   Commonly known as:  ASA   Quantity:  100        ONE DAILY   Refills:  3        blood glucose monitoring test strip   Commonly known as:  NO BRAND SPECIFIED   Quantity:  100 strip        Use to test blood sugars 1 times daily or as directed   Refills:  3        glipiZIDE 10 MG tablet   Commonly known as:  GLUCOTROL   Quantity:  270 tablet        TAKE 2 TABLETS (10MG) BY MOUTH IN THE A.M. AND TAKE 1 TABLET (10MG)EVERY P.M.   Refills:  0        losartan 100 MG tablet   Commonly known as:  COZAAR   Quantity:  90 tablet        TAKE 1 TABLET BY MOUTH EVERY DAY   Refills:  1        MELATONIN PO        Refills:  0        methocarbamol 750 MG tablet   Commonly known as:  ROBAXIN   Dose:  750 mg   Quantity:  60 tablet        Take 1 tablet (750 mg) by mouth every 6 hours as needed for muscle spasms (muscle spasm)   Refills:  0        MICROLET LANCETS Misc   Quantity:  100 each        Use to test blood sugars 1 time daily or as directed   Refills:  3        minocycline 100 MG capsule   Commonly known as:  MINOCIN/DYNACIN   Quantity:  60 capsule        TAKE 1 CAPSULE BY MOUTH TWICE A DAY   Refills:  11        order for DME   Quantity:  1 Units        Equipment being ordered: glucose meter, Dexcom G5   Refills:  1        pioglitazone 30 MG tablet   Commonly known as:  ACTOS   Quantity:  90 tablet        TAKE 1 TABLET BY MOUTH EVERY DAY   Refills:  0        POTASSIUM GLUCONATE PO   Dose:  1 tablet        Take 1 tablet by mouth daily   Refills:  0        sildenafil 50 MG tablet   Commonly known as:  VIAGRA   Dose:  50 mg   Quantity:  12 tablet        Take 1 tablet (50 mg) by mouth daily as needed 30 min to 4 hrs before sex. Do not use with nitroglycerin, terazosin or doxazosin.   Refills:  11        zinc 50 MG Tabs   Dose:  1 tablet        Take 1 tablet by mouth  daily   Refills:  0                Orders Needing Specimen Collection     None      Pending Results     No orders found from 12/2/2018 to 12/5/2018.            Pending Culture Results     No orders found from 12/2/2018 to 12/5/2018.            Pending Results Instructions     If you had any lab results that were not finalized at the time of your Discharge, you can call the ED Lab Result RN at [phone]. You will be contacted by this team for any positive Lab results or changes in treatment. The nurses are available 7 days a week from 10A to 6:30P.  You can leave a message 24 hours per day and they will return your call.        Thank you for choosing [facility]       Thank you for choosing [facility] for your care. Our goal is always to provide you with excellent care. Hearing back from our patients is one way we can continue to improve our services. Please take a few minutes to complete the written survey that you may receive in the mail after you visit with us. Thank you!        [facility] Information     [employer] gives you secure access to your electronic health record. If you see a primary care provider, you can also send messages to your care team and make appointments. If you have questions, please call your primary care clinic.  If you do not have a primary care provider, please call [phone] and they will assist you.        Care EveryWhere ID     This is your Care EveryWhere ID. This could be used by other organizations to access your [facility] medical records  [id number]        Equal Access to Services     JAY MARTINES : Hadii maria m Mcnally, wamartina washington, qadonya blantonalluly nichole. So [facility] [phone].    ATENCIÓN: Si habla español, tiene a [place] disposición servicios gratuitos de asistencia lingüística. Llame al [phone].    We comply with applicable federal civil rights laws and [place] laws. We do not discriminate on the basis of race, color,  national origin, age, disability, sex, sexual orientation, or gender identity.            After Visit Summary       This is your record. Keep this with you and show to your community pharmacist(s) and doctor(s) at your next visit.

## 2018-12-04 NOTE — ED AVS SNAPSHOT
[facility] Emergency Department    [address] [place] [address]    Phone:  [phone]    Fax:  [phone]                                       Waqas Allen   MRN: [id number]    Department:  [facility] Emergency Department   Date of Visit:  12/4/2018           After Visit Summary Signature Page     I have received my discharge instructions, and my questions have been answered. I have discussed any challenges I see with this plan with the nurse or doctor.    ..........................................................................................................................................  Patient/Patient Representative Signature      ..........................................................................................................................................  Patient Representative Print Name and Relationship to Patient    ..................................................               ................................................  Date                                   Time    ..........................................................................................................................................  Reviewed by Signature/Title    ...................................................              ..............................................  Date                                               Time          22EPIC Rev 08/18

## 2018-12-07 ENCOUNTER — HOSPITAL ENCOUNTER (OUTPATIENT)
Facility: CLINIC | Age: 38
End: 2018-12-07
Attending: SURGERY | Admitting: SURGERY
Payer: COMMERCIAL

## 2018-12-07 ENCOUNTER — OFFICE VISIT (OUTPATIENT)
Dept: SURGERY | Facility: CLINIC | Age: 38
End: 2018-12-07
Payer: COMMERCIAL

## 2018-12-07 ENCOUNTER — TELEPHONE (OUTPATIENT)
Dept: SURGERY | Facility: CLINIC | Age: 38
End: 2018-12-07

## 2018-12-07 VITALS
TEMPERATURE: 98.2 F | BODY MASS INDEX: 51.65 KG/M2 | WEIGHT: 310 LBS | DIASTOLIC BLOOD PRESSURE: 86 MMHG | SYSTOLIC BLOOD PRESSURE: 140 MMHG | HEIGHT: 65 IN

## 2018-12-07 DIAGNOSIS — K42.9 UMBILICAL HERNIA WITHOUT OBSTRUCTION AND WITHOUT GANGRENE: Primary | ICD-10-CM

## 2018-12-07 DIAGNOSIS — E11.9 TYPE 2 DIABETES MELLITUS WITHOUT COMPLICATION, WITHOUT LONG-TERM CURRENT USE OF INSULIN (H): ICD-10-CM

## 2018-12-07 PROCEDURE — 99244 OFF/OP CNSLTJ NEW/EST MOD 40: CPT | Performed by: SURGERY

## 2018-12-07 RX ORDER — CEFAZOLIN SODIUM 1 G/50ML
1 SOLUTION INTRAVENOUS SEE ADMIN INSTRUCTIONS
Status: CANCELLED | OUTPATIENT
Start: 2018-12-07

## 2018-12-07 RX ORDER — CEFAZOLIN SODIUM IN 0.9 % NACL 3 G/100 ML
3 INTRAVENOUS SOLUTION, PIGGYBACK (ML) INTRAVENOUS
Status: CANCELLED | OUTPATIENT
Start: 2018-12-07
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
## 2018-12-07 NOTE — TELEPHONE ENCOUNTER
Type of surgery: laparoscopic umbilical hernia repair, with mesh, possible open   Location of surgery: [facility] OR  Date and time of surgery: 12/17  Surgeon: Malick  Pre-Op Appt Date: 12/12  Post-Op Appt Date: 12/28   Packet sent out: Yes  Pre-cert/Authorization completed:  Not Applicable  Date: na

## 2018-12-07 NOTE — MR AVS SNAPSHOT
After Visit Summary   12/7/2018    Waqas Allen    MRN: [id number]           Patient Information     Date Of Birth          1980        Visit Information        Provider Department      12/7/2018 9:45 AM Manas Teresa DO [facility]        Today's Diagnoses     Umbilical hernia without obstruction and without gangrene    -  1       Follow-ups after your visit        Your next 10 appointments already scheduled     Dec 12, 2018  8:20 AM CST   Pre-Op physical with Chance Trent DO   [facility]    [address] [facility] [address]   [phone]            Dec 17, 2018   Procedure with Manas Teresa DO   [facility] ([facility])    [address] [facility] [address] [phone]           From [address]: Exit at [address] on south side of [place]. Turn right on [address]. Turn left at stoplight on [address]. [facility] will be in view two blocks ahead            Dec 28, 2018 11:00 AM CST   Return Visit with Manas Teresa DO   [facility]    [address] [facility] [id number] [phone]              Who to contact     If you have questions or need follow up information about today's clinic visit or your schedule please contact [facility] directly at [phone].  Normal or non-critical lab and imaging results will be communicated to you by MyChart, letter or phone within 4 business days after the clinic has received the results. If you do not hear from us within 7 days, please contact the clinic through MyChart or phone. If you have a critical or abnormal lab result, we will notify you by phone as soon as possible.  Submit refill requests through [employer] or call your pharmacy and they will forward the refill request to us. Please allow 3 business  "days for your refill to be completed.          Additional Information About Your Visit        MyChart Information     [email or web address] gives you secure access to your electronic health record. If you see a primary care provider, you can also send messages to your care team and make appointments. If you have questions, please call your primary care clinic.  If you do not have a primary care provider, please call [phone] and they will assist you.        Care EveryWhere ID     This is your Care EveryWhere ID. This could be used by other organizations to access your [facility] medical records  [id number]        Your Vitals Were     Temperature Height BMI (Body Mass Index)             98.2  F (36.8  C) (Temporal) 1.651 m (5' 5\") 51.59 kg/m2          Blood Pressure from Last 3 Encounters:   12/07/18 140/86   12/04/18 (!) 163/96   07/06/18 (!) 168/111    Weight from Last 3 Encounters:   12/07/18 140.6 kg (310 lb)   12/04/18 138.3 kg (305 lb)   07/06/18 146.5 kg (323 lb)              We Performed the Following     [facility]-Operative Worksheet - Laparoscopic Umbilical Hernia Repair        Primary Care Provider Office Phone # Fax #    Chance Saud Miley,  [phone]       [address] [facility] [address]        Equal Access to Services     [facility] BOBBI : Hadii aad ku hadasho Soomaali, waaxda luqadaha, qaybta kaalmada adeegyada, luly bhagat . So [facility] [phone].    ATENCIÓN: Si habla español, tiene a lancaster disposición servicios gratuitos de asistencia lingüística. Llame al [phone].    We comply with applicable federal civil rights laws and [place] laws. We do not discriminate on the basis of race, color, national origin, age, disability, sex, sexual orientation, or gender identity.            Thank you!     Thank you for choosing [facility]  for your care. Our goal is always to provide you with excellent care. Hearing back from our patients is one way we can " continue to improve our services. Please take a few minutes to complete the written survey that you may receive in the mail after your visit with us. Thank you!             Your Updated Medication List - Protect others around you: Learn how to safely use, store and throw away your medicines at www.disposemymeds.org.          This list is accurate as of 12/7/18 11:40 AM.  Always use your most recent med list.                   Brand Name Dispense Instructions for use Diagnosis    aspirin 81 MG tablet    ASA    100    ONE DAILY    Essential hypertension, benign       blood glucose monitoring test strip    NO BRAND SPECIFIED    100 strip    Use to test blood sugars 1 times daily or as directed    Type 2 diabetes mellitus without complication, without long-term current use of insulin (H)       glipiZIDE 10 MG tablet    GLUCOTROL    270 tablet    TAKE 2 TABLETS (10MG) BY MOUTH IN THE A.M. AND TAKE 1 TABLET (10MG)EVERY P.M.    Type 2 diabetes mellitus without complication, without long-term current use of insulin (H)       losartan 100 MG tablet    COZAAR    90 tablet    TAKE 1 TABLET BY MOUTH EVERY DAY    Hypertension goal BP (blood pressure) < 140/90       MELATONIN PO           methocarbamol 750 MG tablet    ROBAXIN    60 tablet    Take 1 tablet (750 mg) by mouth every 6 hours as needed for muscle spasms (muscle spasm)    AC joint arthropathy, Post-op pain       MICROLET LANCETS Misc     100 each    Use to test blood sugars 1 time daily or as directed    Type 2 diabetes mellitus without complication, without long-term current use of insulin (H)       minocycline 100 MG capsule    MINOCIN/DYNACIN    60 capsule    TAKE 1 CAPSULE BY MOUTH TWICE A DAY    Acne vulgaris       order for DME     1 Units    Equipment being ordered: glucose meter, Dexcom G5    Type 2 diabetes mellitus without complication, without long-term current use of insulin (H)       pioglitazone 30 MG tablet    ACTOS    90 tablet    TAKE 1 TABLET BY MOUTH  EVERY DAY    Type 2 diabetes mellitus without complication, without long-term current use of insulin (H)       POTASSIUM GLUCONATE PO      Take 1 tablet by mouth daily        sildenafil 50 MG tablet    VIAGRA    12 tablet    Take 1 tablet (50 mg) by mouth daily as needed 30 min to 4 hrs before sex. Do not use with nitroglycerin, terazosin or doxazosin.    Vasculogenic erectile dysfunction, unspecified vasculogenic erectile dysfunction type       zinc 50 MG Tabs      Take 1 tablet by mouth daily

## 2018-12-10 RX ORDER — GLIPIZIDE 10 MG/1
TABLET ORAL
Qty: 270 TABLET | Refills: 0 | Status: SHIPPED | OUTPATIENT
Start: 2018-12-10 | End: 2018-12-11

## 2018-12-10 NOTE — TELEPHONE ENCOUNTER
"Requested Prescriptions   Pending Prescriptions Disp Refills     glipiZIDE (GLUCOTROL) 10 MG tablet [Pharmacy Med Name: GLIPIZIDE 10MG TABLET] 270 tablet      Sig: TAKE 2 TABLETS (20MG) BY MOUTH IN THE A.M. AND TAKE 1 TABLET (10MG)EVERY P.M.    Sulfonylurea Agents Failed - 12/7/2018  1:03 AM       Failed - Blood pressure less than 140/90 in past 6 months    BP Readings from Last 3 Encounters:   12/07/18 140/86   12/04/18 (!) 163/96   07/06/18 (!) 168/111                Failed - Patient has documented A1c within the specified period of time.    If HgbA1C is 8 or greater, it needs to be on file within the past 3 months.  If less than 8, must be on file within the past 6 months.     Recent Labs   Lab Test 03/26/18  0840   A1C 8.6*            Failed - Recent (6 mo) or future (30 days) visit within the authorizing provider's specialty    Patient had office visit in the last 6 months or has a visit in the next 30 days with authorizing provider or within the authorizing provider's specialty.  See \"Patient Info\" tab in inbasket, or \"Choose Columns\" in Meds & Orders section of the refill encounter.           Passed - Patient has documented LDL within the past 12 mos.    Recent Labs   Lab Test 03/26/18  0840   LDL 16            Passed - Patient has had a Microalbumin in the past 12 mos.    Recent Labs   Lab Test 03/26/18  0850   MICROL 48   UMALCR 28.80*            Passed - Patient is age 18 or older       Passed - Patient has a recent creatinine (normal) within the past 12 mos.    Recent Labs   Lab Test 03/26/18  0840   CR 0.92               "

## 2018-12-10 NOTE — TELEPHONE ENCOUNTER
Last Written Prescription Date:  9/11/18  Last Fill Quantity: 270,  # refills: 0   Last office visit: 3/26/2018 with prescribing provider:  Chance Trent   Future Office Visit:   Next 5 appointments (look out 90 days)    Dec 12, 2018  8:20 AM CST  Pre-Op physical with [facility] Trent DO  [facility] [address] [facility] [id number] [phone]   Dec 28, 2018 11:00 AM CST  Return Visit with Manas Teresa,   [facility]) [address] [facility] [id number] [phone]           Requested Prescriptions   Pending Prescriptions Disp Refills     glipiZIDE (GLUCOTROL) 10 MG tablet [Pharmacy Med Name: GLIPIZIDE 10MG TABLET] 270 tablet      Sig: TAKE 2 TABLETS (20MG) BY MOUTH IN THE A.M. AND TAKE 1 TABLET (10MG)EVERY P.M.    Sulfonylurea Agents Failed - 12/10/2018  8:46 AM       Failed - Blood pressure less than 140/90 in past 6 months    BP Readings from Last 3 Encounters:   12/07/18 140/86   12/04/18 (!) 163/96   07/06/18 (!) 168/111                Failed - Patient has documented A1c within the specified period of time.    If HgbA1C is 8 or greater, it needs to be on file within the past 3 months.  If less than 8, must be on file within the past 6 months.     Recent Labs   Lab Test 03/26/18  0840   A1C 8.6*            Passed - Patient has documented LDL within the past 12 mos.    Recent Labs   Lab Test 03/26/18  0840   LDL 16            Passed - Patient has had a Microalbumin in the past 12 mos.    Recent Labs   Lab Test 03/26/18  0850   MICROL 48   UMALCR 28.80*            Passed - Patient is age 18 or older       Passed - Patient has a recent creatinine (normal) within the past 12 mos.    Recent Labs   Lab Test 03/26/18  0840   CR 0.92            Passed - Recent (6 mo) or future (30 days) visit within the authorizing provider's specialty    Patient had office visit in the last 6 months or has a  "visit in the next 30 days with authorizing provider or within the authorizing provider's specialty.  See \"Patient Info\" tab in inbasket, or \"Choose Columns\" in Meds & Orders section of the refill encounter.            Routing refill request to provider for review/approval because:  Labs out of range:  BP, A1C  Labs not current:  A1C    Juana Sloan RN        "

## 2018-12-11 DIAGNOSIS — E11.9 TYPE 2 DIABETES MELLITUS WITHOUT COMPLICATION, WITHOUT LONG-TERM CURRENT USE OF INSULIN (H): ICD-10-CM

## 2018-12-12 ENCOUNTER — OFFICE VISIT (OUTPATIENT)
Dept: FAMILY MEDICINE | Facility: OTHER | Age: 38
End: 2018-12-12
Payer: COMMERCIAL

## 2018-12-12 VITALS
SYSTOLIC BLOOD PRESSURE: 144 MMHG | TEMPERATURE: 99.3 F | HEIGHT: 65 IN | WEIGHT: 308 LBS | HEART RATE: 98 BPM | BODY MASS INDEX: 51.32 KG/M2 | RESPIRATION RATE: 20 BRPM | DIASTOLIC BLOOD PRESSURE: 78 MMHG | OXYGEN SATURATION: 99 %

## 2018-12-12 DIAGNOSIS — Z01.818 PREOP GENERAL PHYSICAL EXAM: Primary | ICD-10-CM

## 2018-12-12 DIAGNOSIS — E78.5 HYPERLIPIDEMIA LDL GOAL <100: ICD-10-CM

## 2018-12-12 DIAGNOSIS — K42.9 UMBILICAL HERNIA WITHOUT OBSTRUCTION AND WITHOUT GANGRENE: ICD-10-CM

## 2018-12-12 DIAGNOSIS — E66.01 MORBID OBESITY (H): ICD-10-CM

## 2018-12-12 DIAGNOSIS — E11.69 TYPE 2 DIABETES MELLITUS WITH OTHER SPECIFIED COMPLICATION, WITHOUT LONG-TERM CURRENT USE OF INSULIN (H): ICD-10-CM

## 2018-12-12 DIAGNOSIS — E11.9 TYPE 2 DIABETES MELLITUS WITHOUT COMPLICATION, WITHOUT LONG-TERM CURRENT USE OF INSULIN (H): ICD-10-CM

## 2018-12-12 LAB
ALBUMIN SERPL-MCNC: 3.2 G/DL (ref 3.4–5)
ALBUMIN UR-MCNC: NEGATIVE MG/DL
ALP SERPL-CCNC: 125 U/L (ref 40–150)
ALT SERPL W P-5'-P-CCNC: 37 U/L (ref 0–70)
ANION GAP SERPL CALCULATED.3IONS-SCNC: 7 MMOL/L (ref 3–14)
APPEARANCE UR: CLEAR
AST SERPL W P-5'-P-CCNC: 22 U/L (ref 0–45)
BILIRUB SERPL-MCNC: 0.5 MG/DL (ref 0.2–1.3)
BILIRUB UR QL STRIP: NEGATIVE
BUN SERPL-MCNC: 11 MG/DL (ref 7–30)
CALCIUM SERPL-MCNC: 8 MG/DL (ref 8.5–10.1)
CHLORIDE SERPL-SCNC: 101 MMOL/L (ref 94–109)
CHOLEST SERPL-MCNC: 137 MG/DL
CO2 SERPL-SCNC: 27 MMOL/L (ref 20–32)
COLOR UR AUTO: YELLOW
CREAT SERPL-MCNC: 0.88 MG/DL (ref 0.66–1.25)
GFR SERPL CREATININE-BSD FRML MDRD: >90 ML/MIN/1.7M2
GLUCOSE SERPL-MCNC: 350 MG/DL (ref 70–99)
GLUCOSE UR STRIP-MCNC: >=1000 MG/DL
HBA1C MFR BLD: 12.6 % (ref 0–5.6)
HDLC SERPL-MCNC: 24 MG/DL
HGB UR QL STRIP: NEGATIVE
KETONES UR STRIP-MCNC: NEGATIVE MG/DL
LDLC SERPL CALC-MCNC: ABNORMAL MG/DL
LEUKOCYTE ESTERASE UR QL STRIP: NEGATIVE
NITRATE UR QL: NEGATIVE
NONHDLC SERPL-MCNC: 113 MG/DL
PH UR STRIP: 7 PH (ref 5–7)
POTASSIUM SERPL-SCNC: 4.1 MMOL/L (ref 3.4–5.3)
PROT SERPL-MCNC: 7.1 G/DL (ref 6.8–8.8)
SODIUM SERPL-SCNC: 135 MMOL/L (ref 133–144)
SOURCE: ABNORMAL
SP GR UR STRIP: 1.01 (ref 1–1.03)
TRIGL SERPL-MCNC: 767 MG/DL
UROBILINOGEN UR STRIP-ACNC: 0.2 EU/DL (ref 0.2–1)

## 2018-12-12 PROCEDURE — 83036 HEMOGLOBIN GLYCOSYLATED A1C: CPT | Performed by: INTERNAL MEDICINE

## 2018-12-12 PROCEDURE — 80053 COMPREHEN METABOLIC PANEL: CPT | Performed by: INTERNAL MEDICINE

## 2018-12-12 PROCEDURE — 80061 LIPID PANEL: CPT | Performed by: INTERNAL MEDICINE

## 2018-12-12 PROCEDURE — 93000 ELECTROCARDIOGRAM COMPLETE: CPT | Performed by: INTERNAL MEDICINE

## 2018-12-12 PROCEDURE — 99215 OFFICE O/P EST HI 40 MIN: CPT | Performed by: INTERNAL MEDICINE

## 2018-12-12 PROCEDURE — 81003 URINALYSIS AUTO W/O SCOPE: CPT | Performed by: INTERNAL MEDICINE

## 2018-12-12 PROCEDURE — 36415 COLL VENOUS BLD VENIPUNCTURE: CPT | Performed by: INTERNAL MEDICINE

## 2018-12-12 RX ORDER — GLIPIZIDE 10 MG/1
TABLET ORAL
Qty: 270 TABLET | Refills: 0 | Status: SHIPPED | OUTPATIENT
Start: 2018-12-12 | End: 2019-03-09

## 2018-12-12 ASSESSMENT — PAIN SCALES - GENERAL: PAINLEVEL: SEVERE PAIN (6)

## 2018-12-12 ASSESSMENT — MIFFLIN-ST. JEOR: SCORE: 2248.96

## 2018-12-12 NOTE — PATIENT INSTRUCTIONS
Patient Instructions:  ++++++++++++++++++++++++++++++++++++++++++++   I have asked the patient to :    Patient is instructed to:  Not take the glipizide, pioglitazone or the losartan the morning of the procedure.  Not to take any aspirin 5 days prior to the procedure.                Before Your Surgery      Call your surgeon if there is any change in your health. This includes signs of a cold or flu (such as a sore throat, runny nose, cough, rash or fever).    Do not smoke, drink alcohol or take over the counter medicine (unless your surgeon or primary care doctor tells you to) for the 24 hours before and after surgery.    If you take prescribed drugs: Follow your doctor s orders about which medicines to take and which to stop until after surgery.    Eating and drinking prior to surgery: follow the instructions from your surgeon    Take a shower or bath the night before surgery. Use the soap your surgeon gave you to gently clean your skin. If you do not have soap from your surgeon, use your regular soap. Do not shave or scrub the surgery site.  Wear clean pajamas and have clean sheets on your bed.
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
## 2018-12-12 NOTE — TELEPHONE ENCOUNTER
"Last Written Prescription Date:  12/10/18  Last Fill Quantity: 270,  # refills: 0   Last office visit: No previous visit found with prescribing provider:  Chance Trent   Future Office Visit:   Next 5 appointments (look out 90 days)    Dec 28, 2018 11:00 AM CST  [facility]as [facility] DO  [facility] [address] [place] [id number] [phone]         Requested Prescriptions   Pending Prescriptions Disp Refills     glipiZIDE (GLUCOTROL) 10 MG tablet 270 tablet 0    Sulfonylurea Agents Failed - 12/11/2018 11:24 AM       Failed - Blood pressure less than 140/90 in past 6 months    BP Readings from Last 3 Encounters:   12/12/18 144/78   12/07/18 140/86   12/04/18 (!) 163/96                Failed - Patient has documented A1c within the specified period of time.    If HgbA1C is 8 or greater, it needs to be on file within the past 3 months.  If less than 8, must be on file within the past 6 months.     Recent Labs   Lab Test 12/12/18  0936   A1C 12.6*            Passed - Patient has documented LDL within the past 12 mos.    Recent Labs   Lab Test 12/12/18  0936   LDL Cannot estimate LDL when triglyceride exceeds 400 mg/dL            Passed - Patient has had a Microalbumin in the past 12 mos.    Recent Labs   Lab Test 03/26/18  0850   MICROL 48   UMALCR 28.80*            Passed - Patient is age 18 or older       Passed - Patient has a recent creatinine (normal) within the past 12 mos.    Recent Labs   Lab Test 12/12/18  0936   CR 0.88            Passed - Recent (6 mo) or future (30 days) visit within the authorizing provider's specialty    Patient had office visit in the last 6 months or has a visit in the next 30 days with authorizing provider or within the authorizing provider's specialty.  See \"Patient Info\" tab in inbasket, or \"Choose Columns\" in Meds & Orders section of the refill encounter.            Routing refill request to provider " for review/approval because:  Labs out of range:  BP, A1C    Juana Sloan RN

## 2018-12-13 ENCOUNTER — MYC MEDICAL ADVICE (OUTPATIENT)
Dept: FAMILY MEDICINE | Facility: OTHER | Age: 38
End: 2018-12-13

## 2018-12-13 NOTE — RESULT ENCOUNTER NOTE
Dear Waqas, your recent test results are attached.    The urine sample demonstrates an elevated blood sugar.  Cholesterol cannot be measured due to the elevated triglycerides of 7[address] too much fat in the diet.  This will need to be checked again in the future in a more fasting state.  Mr. annamaria Medrano is a blood sugar of 350 which is markedly elevated.  Kidney function is normal.  Liver tests are normal.  Hemoglobin A1c has gone from 8.6 which is a little high 8 months ago up to 12.6 which is way too high today.  You need to set up an appointment so we can figure out what to do with this markedly worsens of blood sugar.  Feel free to contact me via the office or My Chart if you have any questions regarding the above.  Sincerely,  Chance Trent,  FACOI

## 2018-12-13 NOTE — PROGRESS NOTES
While doing pre-call writer noted A1C to be 12.6 as of 12/12. BGM: 350. Provider cleared pt at pre-op physical. Writer will notify surgeon when he is out of surgery, of these labs.

## 2018-12-14 NOTE — TELEPHONE ENCOUNTER
The elevated A1c reflects your last 4 months blood sugar control.  I would like to see you next week so we may improve on this blood sugar control (obviously, without the use of Actos).  We do not have to wait for the A1c to normalize.  That would take several months.  We only have to get the blood sugar under immediate control.  At that point, you can re-pursue your surgery.  As far as pain medicine, I am not at liberty to give you the opioid-based medication at this time.  Miley

## 2019-01-07 DIAGNOSIS — E11.9 TYPE 2 DIABETES MELLITUS WITHOUT COMPLICATION, WITHOUT LONG-TERM CURRENT USE OF INSULIN (H): ICD-10-CM

## 2019-01-07 RX ORDER — PIOGLITAZONEHYDROCHLORIDE 30 MG/1
TABLET ORAL
Qty: 90 TABLET | Refills: 1 | Status: SHIPPED | OUTPATIENT
Start: 2019-01-07 | End: 2019-07-10

## 2019-01-07 NOTE — TELEPHONE ENCOUNTER
"Requested Prescriptions   Pending Prescriptions Disp Refills     pioglitazone (ACTOS) 30 MG tablet [Pharmacy Med Name: PIOGLITAZONE 30MG TABLET] 90 tablet 0    Last Written Prescription Date:  10/9/18  Last Fill Quantity: 90,  # refills: 0   Last office visit: 12/12/2018 with prescribing provider:     Future Office Visit:     Sig: TAKE 1 TABLET BY MOUTH ONCE DAILY    Thiazolidinedione Agents (TZDs)  Failed - 1/7/2019  1:03 AM       Failed - Blood pressure less than 140/90 in past 6 months    BP Readings from Last 3 Encounters:   12/12/18 144/78   12/07/18 140/86   12/04/18 (!) 163/96          Passed - Patient has documented LDL within the past 12 mos.    Recent Labs   Lab Test 12/12/18  0936   LDL Cannot estimate LDL when triglyceride exceeds 400 mg/dL          Passed - Patient has a normal ALT within the past 12 mos.    Recent Labs   Lab Test 12/12/18  0936   ALT 37          Passed - Patient has a normal AST within the past 12 mos.     Recent Labs   Lab Test 12/12/18  0936   AST 22          Passed - Patient has had a Microalbumin in the past 12 mos.    Recent Labs   Lab Test 03/26/18  0850   MICROL 48   UMALCR 28.80*          Passed - Patient has documented A1c within the specified period of time.    If HgbA1C is 8 or greater, it needs to be on file within the past 3 months.  If less than 8, must be on file within the past 6 months.     Recent Labs   Lab Test 12/12/18  0936   A1C 12.6*          Passed - Diagnosis not CHF       Passed - Medication is active on med list       Passed - Patient is age 18 or older       Passed - Patient has a normal serum Creatinine in the past 12 months    Recent Labs   Lab Test 12/12/18  0936   CR 0.88          Passed - Recent (6 mo) or future (30 days) visit within the authorizing provider's specialty    Patient had office visit in the last 6 months or has a visit in the next 30 days with authorizing provider or within the authorizing provider's specialty.  See \"Patient Info\" tab in " "inbasket, or \"Choose Columns\" in Meds & Orders section of the refill encounter.          Routing refill request to provider for review/approval because:  Labs out of range:  A1C, UMALCR    T'd up 1 month for provider review.    Tori Salter RN              "

## 2019-02-06 DIAGNOSIS — L70.0 ACNE VULGARIS: ICD-10-CM

## 2019-02-06 DIAGNOSIS — I10 HYPERTENSION GOAL BP (BLOOD PRESSURE) < 140/90: ICD-10-CM

## 2019-02-06 RX ORDER — LOSARTAN POTASSIUM 100 MG/1
TABLET ORAL
Qty: 90 TABLET | Refills: 1 | Status: SHIPPED | OUTPATIENT
Start: 2019-02-06 | End: 2019-08-08

## 2019-02-06 RX ORDER — MINOCYCLINE HYDROCHLORIDE 100 MG/1
CAPSULE ORAL
Qty: 60 CAPSULE | Refills: 9 | Status: SHIPPED | OUTPATIENT
Start: 2019-02-06 | End: 2019-12-06

## 2019-02-06 NOTE — TELEPHONE ENCOUNTER
"Requested Prescriptions   Pending Prescriptions Disp Refills     losartan (COZAAR) 100 MG tablet [Pharmacy Med Name: LOSARTAN 100MG TABLET] 90 tablet 1    Last Written Prescription Date:  8/21/18  Last Fill Quantity: 90,  # refills: 1   Last office visit: 12/12/2018 with prescribing provider:     Future Office Visit:     Sig: TAKE 1 TABLET BY MOUTH EVERY DAY    Angiotensin-II Receptors Failed - 2/6/2019  1:08 AM       Failed - Blood pressure under 140/90 in past 12 months    BP Readings from Last 3 Encounters:   12/12/18 144/78   12/07/18 140/86   12/04/18 (!) 163/96          Passed - Recent (12 mo) or future (30 days) visit within the authorizing provider's specialty    Patient had office visit in the last 12 months or has a visit in the next 30 days with authorizing provider or within the authorizing provider's specialty.  See \"Patient Info\" tab in inbasket, or \"Choose Columns\" in Meds & Orders section of the refill encounter.           Passed - Medication is active on med list       Passed - Patient is age 18 or older       Passed - Normal serum creatinine on file in past 12 months    Recent Labs   Lab Test 12/12/18  0936   CR 0.88          Passed - Normal serum potassium on file in past 12 months    Recent Labs   Lab Test 12/12/18  0936   POTASSIUM 4.1         Prescription approved per [facility] Refill Protocol.  Tori Salter RN      "

## 2019-02-06 NOTE — TELEPHONE ENCOUNTER
"Requested Prescriptions   Pending Prescriptions Disp Refills     minocycline (MINOCIN/DYNACIN) 100 MG capsule [Pharmacy Med Name: MINOCYCLINE 100MG CAP] 60 capsule 11    Last Written Prescription Date:  2/5/18  Last Fill Quantity: 60,  # refills: 11   Last office visit: 12/12/2018 with prescribing provider:     Future Office Visit:     Sig: TAKE 1 CAPSULE BY MOUTH TWICE A DAY    Oral Acne/Rosacea Medications Protocol Failed - 2/6/2019  1:02 AM       Failed - Confirmation of diagnosis is required    Please confirm diagnosis is acne or rosacea.     If NOT acne or rosacea; refer request to provider for further evaluation.    If diagnosis IS acne or rosacea, OK to refill BASED ON PREVIOUS REFILL CLINICAL NOTE RECOMMENDATION.         Passed - Patient is 12 years of age or older       Passed - Recent (12 mo) or future (30 days) visit within the authorizing provider's specialty    Patient had office visit in the last 12 months or has a visit in the next 30 days with authorizing provider or within the authorizing provider's specialty.  See \"Patient Info\" tab in inbasket, or \"Choose Columns\" in Meds & Orders section of the refill encounter.             Passed - Medication is active on med list      Prescription approved per [facility] Refill Protocol.  Tori Salter RN      "

## 2019-03-09 DIAGNOSIS — E11.9 TYPE 2 DIABETES MELLITUS WITHOUT COMPLICATION, WITHOUT LONG-TERM CURRENT USE OF INSULIN (H): ICD-10-CM

## 2019-03-11 RX ORDER — GLIPIZIDE 10 MG/1
TABLET ORAL
Qty: 270 TABLET | Refills: 0 | Status: SHIPPED | OUTPATIENT
Start: 2019-03-11 | End: 2019-04-17

## 2019-03-11 NOTE — TELEPHONE ENCOUNTER
"Requested Prescriptions   Pending Prescriptions Disp Refills     glipiZIDE (GLUCOTROL) 10 MG tablet [Pharmacy Med Name: GLIPIZIDE 10MG TABLET] 270 tablet 0    Last Written Prescription Date:  12/12/18  Last Fill Quantity: 270,  # refills: 0   Last office visit: No previous visit found with prescribing provider:  12/12/18   Future Office Visit:     Sig: TAKE 2 TABLETS (20MG) BY MOUTH IN THE A.M. AND TAKE 1 TABLET (10MG)EVERY P.M.    Sulfonylurea Agents Failed - 3/9/2019  1:02 AM       Failed - Blood pressure less than 140/90 in past 6 months    BP Readings from Last 3 Encounters:   12/12/18 144/78   12/07/18 140/86   12/04/18 (!) 163/96                Passed - Patient has documented LDL within the past 12 mos.    Recent Labs   Lab Test 12/12/18  0936   LDL Cannot estimate LDL when triglyceride exceeds 400 mg/dL            Passed - Patient has had a Microalbumin in the past 12 mos.    Recent Labs   Lab Test 03/26/18  0850   MICROL 48   UMALCR 28.80*            Passed - Patient has documented A1c within the specified period of time.    If HgbA1C is 8 or greater, it needs to be on file within the past 3 months.  If less than 8, must be on file within the past 6 months.     Recent Labs   Lab Test 12/12/18  0936   A1C 12.6*            Passed - Medication is active on med list       Passed - Patient is age 18 or older       Passed - Patient has a recent creatinine (normal) within the past 12 mos.    Recent Labs   Lab Test 12/12/18  0936   CR 0.88            Passed - Recent (6 mo) or future (30 days) visit within the authorizing provider's specialty    Patient had office visit in the last 6 months or has a visit in the next 30 days with authorizing provider or within the authorizing provider's specialty.  See \"Patient Info\" tab in inbasket, or \"Choose Columns\" in Meds & Orders section of the refill encounter.            "

## 2019-03-11 NOTE — TELEPHONE ENCOUNTER
Routing refill request to provider for review/approval because:  Labs out of range:  BP, LDL, Microalbumin    MARCIO WalshN, RN  [facility]

## 2019-03-18 ENCOUNTER — ANCILLARY PROCEDURE (OUTPATIENT)
Dept: GENERAL RADIOLOGY | Facility: CLINIC | Age: 39
End: 2019-03-18
Attending: ORTHOPAEDIC SURGERY
Payer: COMMERCIAL

## 2019-03-18 ENCOUNTER — OFFICE VISIT (OUTPATIENT)
Dept: ORTHOPEDICS | Facility: CLINIC | Age: 39
End: 2019-03-18
Payer: COMMERCIAL

## 2019-03-18 VITALS
DIASTOLIC BLOOD PRESSURE: 85 MMHG | BODY MASS INDEX: 48.65 KG/M2 | WEIGHT: 285 LBS | HEIGHT: 64 IN | SYSTOLIC BLOOD PRESSURE: 130 MMHG

## 2019-03-18 DIAGNOSIS — M76.891 TENDINITIS OF RIGHT QUADRICEPS TENDON: ICD-10-CM

## 2019-03-18 DIAGNOSIS — S89.91XA INJURY OF RIGHT KNEE, INITIAL ENCOUNTER: ICD-10-CM

## 2019-03-18 DIAGNOSIS — S89.91XA INJURY OF RIGHT KNEE, INITIAL ENCOUNTER: Primary | ICD-10-CM

## 2019-03-18 DIAGNOSIS — M25.561 MEDIAL KNEE PAIN, RIGHT: ICD-10-CM

## 2019-03-18 PROCEDURE — 99214 OFFICE O/P EST MOD 30 MIN: CPT | Performed by: ORTHOPAEDIC SURGERY

## 2019-03-18 PROCEDURE — 73564 X-RAY EXAM KNEE 4 OR MORE: CPT | Mod: TC

## 2019-03-18 ASSESSMENT — MIFFLIN-ST. JEOR: SCORE: 2120.81

## 2019-03-18 ASSESSMENT — PAIN SCALES - GENERAL: PAINLEVEL: SEVERE PAIN (7)

## 2019-03-18 NOTE — LETTER
"    3/18/2019         RE: Waqas Allen  [address] [facility] [address]        Dear Colleague,    Thank you for referring your patient, Waqas Allen, to the [facility]. Please see a copy of my visit note below.    Waqas Allen is a 38 year old male who is seen in consultation at the request of self.  History of Present illness:  Waqas presents for evaluation of:  1.) right knee  Onset: between Christmas and New yeras  Symptoms brought on by: fall:   Character:  constant and sharp.    Progression of symptoms:  worse.    Previous similar pain: no .   Pain Level:  7/10.   Previous treatments:  acetaminophen and Ibuprofen.  Currently on Blood thinners? Yes-asa  Diagnosis of Diabetes? Yes    HISTORY OF PRESENT ILLNESS:    Waqas Allen is a 38 year old male who is seen in follow up for   Chief Complaint   Patient presents with     RECHECK     NEW ISSUE: Right knee pain after fall.   Present symptoms: Patient reports as above that he had fallen on ice covered concrete straight down on both knees in December.  The left knee recovered after about 3 weeks.  The right knee still has residual symptoms on the medial aspect of his knee as well as superior to the kneecap and occasionally across the kneecap.  Patient describes no one particular position to bring on the pain.  He states the pain is worse at night.  He has utilized both ibuprofen and Tylenol as well as ice.  He states the pain is worse at night.  Robaxin has helped him some.  Treatments tried to this point: As above  Family present: None  Patient evaluation done with Dr. Duckworth    Physical Exam:  Vitals: /85   Ht 1.613 m (5' 3.5\")   Wt 129.3 kg (285 lb)   BMI 49.69 kg/m     BMI= Body mass index is 49.69 kg/m .  Constitutional: healthy, alert and no acute distress   Psychiatric: mentation appears normal and affect normal/bright  NEURO: no focal deficits  SKIN: no excoriation or erythema. No signs of " infection.  JOINT/EXTREMITIES:  Affected extremity pulses are easily palpable.  Right knee Exam: Inspection: No discernible swelling visualized.  This is symmetrical with the left knee.  Tender: quadriceps insertion, medial joint line  Nontender parapatellar  Active Range of Motion: full flexion, full extension, no patellofemoral crepitus  Strength: Patient was symmetrical quad tone  Special tests: negative Erin's   GAIT: non-antalgic    IMAGING INTERPRETATION: X-ray images reveal good spacing between the medial and lateral compartments as well as patellofemoral compartment.  There is a slight lateral tilt of the patellofemoral compartment bilaterally.  Also seen is a slight amount of lateral spurring patella on the right.  He is also some mild osteophyte formation superior right patella seen on lateral views.    Independent visualization of the images was performed.     ASSESSMENT:    Chief Complaint   Patient presents with     RECHECK     NEW ISSUE: Right knee pain after fall.       ICD-10-CM    1. Injury of right knee, initial encounter S89.91XA XR Knee Right G/E 4 Views   2. Tendinitis of right quadriceps tendon M76.891    3. Medial knee pain, right M25.561      Patient with a fall on ice that resulted in persistent pain to his right knee including the medial aspect of his right knee as well as the quad insertion.  The patient may have quadriceps tendinitis or mild incomplete tearing at the insertion with possibility of meniscal tear medially.  Patient may also have patellofemoral disease that was exacerbated by this fall.  Patient does have some findings on imaging to support this.    Plan:   Patient is advised of the above findings.  MRI evaluation is recommended to further evaluate the meniscus.  Patient should continue conservative approaches to management of his pain with ibuprofen and Tylenol  Return to clinic after MRI is accomplished for repeat examination    BP Readings from Last 1 Encounters:    03/18/19 130/85     BP noted to be well controlled today in office.     Scribed by  Tali Mauricio PA-C   3/18/2019  2:40 PM      I attest I have seen and evaluated the patient.  I agree with above impression and plan.    Watson Duckworth MD        Again, thank you for allowing me to participate in the care of your patient.        Sincerely,        Watson Duckworth MD

## 2019-03-18 NOTE — PROGRESS NOTES
"Waqas Allen is a 38 year old male who is seen in consultation at the request of self.  History of Present illness:  Waqas presents for evaluation of:  1.) right knee  Onset: between Christmas and New yeras  Symptoms brought on by: fall:   Character:  constant and sharp.    Progression of symptoms:  worse.    Previous similar pain: no .   Pain Level:  7/10.   Previous treatments:  acetaminophen and Ibuprofen.  Currently on Blood thinners? Yes-asa  Diagnosis of Diabetes? Yes    HISTORY OF PRESENT ILLNESS:    Waqas Allen is a 38 year old male who is seen in follow up for   Chief Complaint   Patient presents with     RECHECK     NEW ISSUE: Right knee pain after fall.   Present symptoms: Patient reports as above that he had fallen on ice covered concrete straight down on both knees in December.  The left knee recovered after about 3 weeks.  The right knee still has residual symptoms on the medial aspect of his knee as well as superior to the kneecap and occasionally across the kneecap.  Patient describes no one particular position to bring on the pain.  He states the pain is worse at night.  He has utilized both ibuprofen and Tylenol as well as ice.  He states the pain is worse at night.  Robaxin has helped him some.  Treatments tried to this point: As above  Family present: None  Patient evaluation done with Dr. Duckworth    Physical Exam:  Vitals: /85   Ht 1.613 m (5' 3.5\")   Wt 129.3 kg (285 lb)   BMI 49.69 kg/m    BMI= Body mass index is 49.69 kg/m .  Constitutional: healthy, alert and no acute distress   Psychiatric: mentation appears normal and affect normal/bright  NEURO: no focal deficits  SKIN: no excoriation or erythema. No signs of infection.  JOINT/EXTREMITIES:  Affected extremity pulses are easily palpable.  Right knee Exam: Inspection: No discernible swelling visualized.  This is symmetrical with the left knee.  Tender: quadriceps insertion, medial joint line  Nontender " parapatellar  Active Range of Motion: full flexion, full extension, no patellofemoral crepitus  Strength: Patient was symmetrical quad tone  Special tests: negative Erin's   GAIT: non-antalgic    IMAGING INTERPRETATION: X-ray images reveal good spacing between the medial and lateral compartments as well as patellofemoral compartment.  There is a slight lateral tilt of the patellofemoral compartment bilaterally.  Also seen is a slight amount of lateral spurring patella on the right.  He is also some mild osteophyte formation superior right patella seen on lateral views.    Independent visualization of the images was performed.     ASSESSMENT:    Chief Complaint   Patient presents with     RECHECK     NEW ISSUE: Right knee pain after fall.       ICD-10-CM    1. Injury of right knee, initial encounter S89.91XA XR Knee Right G/E 4 Views   2. Tendinitis of right quadriceps tendon M76.891    3. Medial knee pain, right M25.561      Patient with a fall on ice that resulted in persistent pain to his right knee including the medial aspect of his right knee as well as the quad insertion.  The patient may have quadriceps tendinitis or mild incomplete tearing at the insertion with possibility of meniscal tear medially.  Patient may also have patellofemoral disease that was exacerbated by this fall.  Patient does have some findings on imaging to support this.    Plan:   Patient is advised of the above findings.  MRI evaluation is recommended to further evaluate the meniscus.  Patient should continue conservative approaches to management of his pain with ibuprofen and Tylenol  Return to clinic after MRI is accomplished for repeat examination    BP Readings from Last 1 Encounters:   03/18/19 130/85     BP noted to be well controlled today in office.     Scribed by  Tali Mauricio PA-C   3/18/2019  2:40 PM      I attest I have seen and evaluated the patient.  I agree with above impression and plan.    Watson Duckworth MD

## 2019-03-22 ENCOUNTER — HOSPITAL ENCOUNTER (OUTPATIENT)
Dept: MRI IMAGING | Facility: CLINIC | Age: 39
Discharge: HOME | End: 2019-03-22
Attending: PHYSICIAN ASSISTANT | Admitting: PHYSICIAN ASSISTANT
Payer: COMMERCIAL

## 2019-03-22 DIAGNOSIS — S89.91XA INJURY OF RIGHT KNEE, INITIAL ENCOUNTER: ICD-10-CM

## 2019-03-22 DIAGNOSIS — M25.561 MEDIAL KNEE PAIN, RIGHT: ICD-10-CM

## 2019-03-22 DIAGNOSIS — M76.891 TENDINITIS OF RIGHT QUADRICEPS TENDON: ICD-10-CM

## 2019-03-22 PROCEDURE — 73721 MRI JNT OF LWR EXTRE W/O DYE: CPT | Mod: RT

## 2019-04-03 ENCOUNTER — OFFICE VISIT (OUTPATIENT)
Dept: ORTHOPEDICS | Facility: CLINIC | Age: 39
End: 2019-04-03
Payer: COMMERCIAL

## 2019-04-03 VITALS
DIASTOLIC BLOOD PRESSURE: 84 MMHG | WEIGHT: 292 LBS | BODY MASS INDEX: 49.85 KG/M2 | HEART RATE: 97 BPM | SYSTOLIC BLOOD PRESSURE: 150 MMHG | HEIGHT: 64 IN

## 2019-04-03 DIAGNOSIS — M22.2X1 PATELLOFEMORAL PAIN SYNDROME OF RIGHT KNEE: Primary | ICD-10-CM

## 2019-04-03 DIAGNOSIS — S89.91XA INJURY OF RIGHT KNEE, INITIAL ENCOUNTER: ICD-10-CM

## 2019-04-03 PROCEDURE — 99213 OFFICE O/P EST LOW 20 MIN: CPT | Performed by: ORTHOPAEDIC SURGERY

## 2019-04-03 RX ORDER — OXYCODONE HYDROCHLORIDE 5 MG/1
5 TABLET ORAL EVERY 6 HOURS PRN
Qty: 12 TABLET | Refills: 0 | Status: SHIPPED | OUTPATIENT
Start: 2019-04-03 | End: 2019-04-17

## 2019-04-03 ASSESSMENT — MIFFLIN-ST. JEOR: SCORE: 2152.56

## 2019-04-03 ASSESSMENT — PAIN SCALES - GENERAL: PAINLEVEL: EXTREME PAIN (8)

## 2019-04-03 NOTE — PROGRESS NOTES
"Office Visit-Follow up  HISTORY OF PRESENT ILLNESS:    Waqas Allen is a 38 year old male who is seen in follow up for   Chief Complaint   Patient presents with     RECHECK     Right knee pain.  Onset: December 2018 or January 2019.  Fell on knee.       Patient presents with:  RECHECK: Right knee pain.  Onset: December 2018 or January 2019.  Fell on knee.      Patient is returning to discuss his MRI.  Present symptoms: Describes symptoms primarily as anterior knee and thigh.  There is some predilection for the lateral aspect of the knee today.  Treatments tried to this point: No formal treatment was accomplished as we were awaiting the results of his MRI.    REVIEW OF SYSTEMS    General: negative for, night sweats, dizziness, fatigue  Resp: No shortness of breath and no cough  CV: negative for chest pain, syncope or near-syncope  GI: negative for nausea, vomiting and diarrhea  : negative for dysuria and hematuria  Musculoskeletal: as above  Neurologic: negative for syncope   Hematologic: negative for bleeding disorder    Physical Exam:    Vitals: /84 (BP Location: Left arm, Patient Position: Chair, Cuff Size: Adult Large)   Pulse 97   Ht 1.613 m (5' 3.5\")   Wt 132.5 kg (292 lb)   BMI 50.91 kg/m    BMI= Body mass index is 50.91 kg/m .  Constitutional: healthy, alert and no acute distress   Psychiatric: mentation appears normal and affect normal/bright  NEURO: no focal deficits  SKIN: no excoriation or erythema. No signs of infection.    GAIT: antalgic    JOINT/EXTREMITIES:     In my opinion he has a small effusion on the right.  He does have a fair amount of lateral and medial patellar retinaculum discomfort.  There is still a hint of medial knee discomfort.  This is not necessarily focused to the joint line today.    IMAGING INTERPRETATION: The MRI report and scan were reviewed.  I would concur with the report.  There is patella lateral tracking and the lateral patella facet articular surface appears " to be narrowed.       Impression:      ICD-10-CM    1. Patellofemoral pain syndrome of right knee M22.2X1 PHYSICAL THERAPY REFERRAL   2. Injury of right knee, initial encounter S89.91XA oxyCODONE (ROXICODONE) 5 MG tablet       In the absence of any additional pathology on his MRI is probably just struggling with patellofemoral syndrome.    Plan:   The above was reviewed with Waqas and he appears to understand.     Will be referred to therapy for conference of patellofemoral program.  My suggestion is he returns in about 4 weeks where if he struggling we would offer him an intra-articular injection.  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  He understands the concern with this medication      Return to clinic 4, weeks    Watson Duckworth MD

## 2019-04-03 NOTE — LETTER
"    4/3/2019         RE: Waqas Allen  [address] [facility] [address]        Dear Colleague,    Thank you for referring your patient, Waqas Allen, to the [facility]. Please see a copy of my visit note below.    Office Visit-Follow up  HISTORY OF PRESENT ILLNESS:    Waqas Allen is a 38 year old male who is seen in follow up for   Chief Complaint   Patient presents with     RECHECK     Right knee pain.  Onset: December 2018 or January 2019.  Fell on knee.       Patient presents with:  RECHECK: Right knee pain.  Onset: December 2018 or January 2019.  Fell on knee.      Patient is returning to discuss his MRI.  Present symptoms: Describes symptoms primarily as anterior knee and thigh.  There is some predilection for the lateral aspect of the knee today.  Treatments tried to this point: No formal treatment was accomplished as we were awaiting the results of his MRI.    REVIEW OF SYSTEMS    General: negative for, night sweats, dizziness, fatigue  Resp: No shortness of breath and no cough  CV: negative for chest pain, syncope or near-syncope  GI: negative for nausea, vomiting and diarrhea  : negative for dysuria and hematuria  Musculoskeletal: as above  Neurologic: negative for syncope   Hematologic: negative for bleeding disorder    Physical Exam:    Vitals: /84 (BP Location: Left arm, Patient Position: Chair, Cuff Size: Adult Large)   Pulse 97   Ht 1.613 m (5' 3.5\")   Wt 132.5 kg (292 lb)   BMI 50.91 kg/m     BMI= Body mass index is 50.91 kg/m .  Constitutional: healthy, alert and no acute distress   Psychiatric: mentation appears normal and affect normal/bright  NEURO: no focal deficits  SKIN: no excoriation or erythema. No signs of infection.    GAIT: antalgic    JOINT/EXTREMITIES:     In my opinion he has a small effusion on the right.  He does have a fair amount of lateral and medial patellar retinaculum discomfort.  There is still a hint of medial knee discomfort.  " This is not necessarily focused to the joint line today.    IMAGING INTERPRETATION: The MRI report and scan were reviewed.  I would concur with the report.  There is patella lateral tracking and the lateral patella facet articular surface appears to be narrowed.       Impression:      ICD-10-CM    1. Patellofemoral pain syndrome of right knee M22.2X1 PHYSICAL THERAPY REFERRAL   2. Injury of right knee, initial encounter S89.91XA oxyCODONE (ROXICODONE) 5 MG tablet       In the absence of any additional pathology on his MRI is probably just struggling with patellofemoral syndrome.    Plan:   The above was reviewed with Waqas and he appears to understand.     Will be referred to therapy for conference of patellofemoral program.  My suggestion is he returns in about 4 weeks where if he struggling we would offer him an intra-articular injection.  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  He understands the concern with this medication      Return to clinic 4, weeks    Watson Duckworth MD    Again, thank you for allowing me to participate in the care of your patient.        Sincerely,        Watson Duckworth MD

## 2019-04-04 ENCOUNTER — MYC MEDICAL ADVICE (OUTPATIENT)
Dept: FAMILY MEDICINE | Facility: OTHER | Age: 39
End: 2019-04-04

## 2019-04-04 DIAGNOSIS — S39.012A BACK STRAIN, INITIAL ENCOUNTER: Primary | ICD-10-CM

## 2019-04-04 RX ORDER — METHOCARBAMOL 750 MG/1
750 TABLET, FILM COATED ORAL 4 TIMES DAILY PRN
Status: CANCELLED | OUTPATIENT
Start: 2019-04-04

## 2019-04-04 NOTE — TELEPHONE ENCOUNTER
Patient was not seen for back or neck issue. Does not appear that he has been seen by orthopedics for back or neck issue.  Would not prescribe medication for back or neck problems. Recommend he get seen or speak with PCP.      KITA Ponce, CNP  Orthopedic Surgery

## 2019-04-04 NOTE — TELEPHONE ENCOUNTER
Routing to Dr. Duckworth to advise. Was last seen by Dr. Trent on 12/12/2018 for pre-op. Saw Donita on 4/03/2019- Patellofemoral pain syndrome of right knee was prescribed Oxycodone.     Adrienne Briscoe MA

## 2019-04-04 NOTE — TELEPHONE ENCOUNTER
[employer] message sent to patient with options of waiting for [facility] or seeing if another provider could work him in.     Adrienne Briscoe MA

## 2019-04-09 RX ORDER — METHOCARBAMOL 750 MG/1
750 TABLET, FILM COATED ORAL 3 TIMES DAILY
Qty: 15 TABLET | Refills: 0 | Status: SHIPPED | OUTPATIENT
Start: 2019-04-09 | End: 2019-04-17

## 2019-04-12 ENCOUNTER — HOSPITAL ENCOUNTER (EMERGENCY)
Facility: CLINIC | Age: 39
Discharge: HOME | End: 2019-04-12
Attending: NURSE PRACTITIONER | Admitting: NURSE PRACTITIONER
Payer: COMMERCIAL

## 2019-04-12 VITALS
SYSTOLIC BLOOD PRESSURE: 146 MMHG | RESPIRATION RATE: 18 BRPM | HEART RATE: 89 BPM | WEIGHT: 265 LBS | TEMPERATURE: 98.3 F | HEIGHT: 65 IN | BODY MASS INDEX: 44.15 KG/M2 | DIASTOLIC BLOOD PRESSURE: 93 MMHG | OXYGEN SATURATION: 100 %

## 2019-04-12 DIAGNOSIS — L02.212 ABSCESS OF BACK: ICD-10-CM

## 2019-04-12 PROCEDURE — 99283 EMERGENCY DEPT VISIT LOW MDM: CPT | Mod: 25 | Performed by: NURSE PRACTITIONER

## 2019-04-12 PROCEDURE — 10060 I&D ABSCESS SIMPLE/SINGLE: CPT | Mod: Z6 | Performed by: NURSE PRACTITIONER

## 2019-04-12 PROCEDURE — 25000125 ZZHC RX 250: Performed by: NURSE PRACTITIONER

## 2019-04-12 PROCEDURE — 10060 I&D ABSCESS SIMPLE/SINGLE: CPT | Performed by: NURSE PRACTITIONER

## 2019-04-12 PROCEDURE — 99284 EMERGENCY DEPT VISIT MOD MDM: CPT | Mod: 25 | Performed by: NURSE PRACTITIONER

## 2019-04-12 RX ORDER — OXYCODONE AND ACETAMINOPHEN 5; 325 MG/1; MG/1
1 TABLET ORAL EVERY 6 HOURS PRN
Qty: 12 TABLET | Refills: 0 | Status: SHIPPED | OUTPATIENT
Start: 2019-04-12 | End: 2019-05-06

## 2019-04-12 RX ORDER — SULFAMETHOXAZOLE/TRIMETHOPRIM 800-160 MG
1 TABLET ORAL 2 TIMES DAILY
Qty: 20 TABLET | Refills: 0 | Status: SHIPPED | OUTPATIENT
Start: 2019-04-12 | End: 2019-04-19

## 2019-04-12 RX ADMIN — LIDOCAINE HYDROCHLORIDE 10 ML: 10 INJECTION, SOLUTION EPIDURAL; INFILTRATION; INTRACAUDAL; PERINEURAL at 17:39

## 2019-04-12 ASSESSMENT — ENCOUNTER SYMPTOMS: WOUND: 1

## 2019-04-12 ASSESSMENT — MIFFLIN-ST. JEOR: SCORE: 2048.91

## 2019-04-12 NOTE — ED TRIAGE NOTES
Pt states cyst has returned on back and is infected. Pt had it drained before. PCP can't see him until Wed. Pt states it hurts to back to lay down or even sit against seats in a truck.

## 2019-04-12 NOTE — ED AVS SNAPSHOT
[facility]  [address] [place] [address]  Phone:  [phone]  Fax:  [phone]                                    Waqas Allen   MRN: [id number]    Department:  [facility]   Date of Visit:  4/12/2019           After Visit Summary Signature Page    I have received my discharge instructions, and my questions have been answered. I have discussed any challenges I see with this plan with the nurse or doctor.    ..........................................................................................................................................  Patient/Patient Representative Signature      ..........................................................................................................................................  Patient Representative Print Name and Relationship to Patient    ..................................................               ................................................  Date                                   Time    ..........................................................................................................................................  Reviewed by Signature/Title    ...................................................              ..............................................  Date                                               Time          22EPIC Rev 08/18

## 2019-04-13 NOTE — ED PROVIDER NOTES
"  History     Chief Complaint   Patient presents with     Cyst     HPI  Waqas Allen is a 39 year old male who presents to the ED today with c/o abscess to upper left back.  Patient has had this in the past, was told at that time it would ultimately need surgery for permanent removal.  Patient reports it has been increasing in size/pain for the last week.  Patient is a Type 2 diabetic, states his blood sugars have been \"fine\".  Patient denies any systemic symptoms.  Patient has not been on recent antibiotics.     Allergies:  Allergies   Allergen Reactions     Anaprox [Naproxen Sodium]      Nausea/vomiting     Darvocet [Propoxyphene N-Apap] Other (See Comments)     \"felt like I had a hangover\".  Patient tolerates Tylenol/Acetaminophen     Hctz [Hydrochlorothiazide] Other (See Comments)     impotence     Indocin [Indomethacin] GI Disturbance     Metformin GI Disturbance     Tramadol GI Disturbance       Problem List:    Patient Active Problem List    Diagnosis Date Noted     Morbid obesity (H) 02/05/2018     Priority: Medium     AC joint arthropathy 01/06/2017     Priority: Medium     Type 2 diabetes mellitus with other specified complication, without long-term current use of insulin (H) 12/15/2015     Priority: Medium     Sprain of acromioclavicular ligament 11/25/2014     Priority: Medium     Problem list name updated by automated process. Provider to review       Hypertension goal BP (blood pressure) < 140/90 10/02/2011     Priority: Medium     Acne 06/09/2010     Priority: Medium        Past Medical History:    Past Medical History:   Diagnosis Date     Unspecified essential hypertension      Variants of migraine, not elsewhere classified, without mention of intractable migraine without mention of status migrainosus      Varicella without mention of complication 1993       Past Surgical History:    Past Surgical History:   Procedure Laterality Date     ARTHROSCOPY SHOULDER DECOMPRESSION Left 1/20/2016    " Procedure: ARTHROSCOPY SHOULDER DECOMPRESSION;  Surgeon: Watson Duckworth MD;  Location: [facility] OR     ARTHROSCOPY SHOULDER DECOMPRESSION Right 2/1/2017    Procedure: ARTHROSCOPY SHOULDER DECOMPRESSION;  Surgeon: Watson Duckworth MD;  Location: [facility]     ARTHROSCOPY SHOULDER DISTAL CLAVICLE REPAIR Left 1/20/2016    Procedure: ARTHROSCOPY SHOULDER DISTAL CLAVICLE RESECTION;  Surgeon: Watson Duckworth MD;  Location: [facility] OR     ARTHROSCOPY SHOULDER DISTAL CLAVICLE REPAIR Right 2/1/2017    Procedure: ARTHROSCOPY SHOULDER DISTAL CLAVICLE RESECTION;  Surgeon: Watson Duckworth MD;  Location: [facility] OR     LAMINECT/DISCECTOMY, LUMBAR  04/24/2007    Left L3-L4 Hemilaminectomy/Microdiskectomy.  [facility]       Family History:    Family History   Problem Relation Age of Onset     Hypertension Brother      Cerebrovascular Disease Maternal Grandfather      Unknown/Adopted Father        Social History:  Marital Status:  Single [1]  Social History     Tobacco Use     Smoking status: Never Smoker     Smokeless tobacco: Never Used   Substance Use Topics     Alcohol use: Yes     Alcohol/week: 0.6 - 1.2 oz     Types: 1 - 2 Standard drinks or equivalent per week     Comment: occ     Drug use: No        Medications:      oxyCODONE-acetaminophen (PERCOCET) 5-325 MG tablet   sulfamethoxazole-trimethoprim (BACTRIM DS) 800-160 MG tablet   ASPIRIN 81 MG OR TABS   blood glucose monitoring (NO BRAND SPECIFIED) test strip   glipiZIDE (GLUCOTROL) 10 MG tablet   losartan (COZAAR) 100 MG tablet   MELATONIN PO   methocarbamol (ROBAXIN) 750 MG tablet   MICROLET LANCETS MISC   minocycline (MINOCIN/DYNACIN) 100 MG capsule   order for DME   pioglitazone (ACTOS) 30 MG tablet   POTASSIUM GLUCONATE PO   sildenafil (VIAGRA) 50 MG tablet   zinc 50 MG TABS         Review of Systems   Skin: Positive for wound.   All other systems reviewed and are negative.      Physical Exam   BP: (!) 146/93  Pulse: 89  Temp: 98.3  F (36.8  C)  Resp: 18  Height:  "165.1 cm (5' 5\")  Weight: 120.2 kg (265 lb)  SpO2: 100 %      Physical Exam   Constitutional: He is oriented to person, place, and time. He appears well-developed and well-nourished. No distress.   HENT:   Head: Normocephalic.   Eyes: No scleral icterus.   Neck: Normal range of motion.   Cardiovascular: Normal rate and intact distal pulses.   Pulmonary/Chest: Effort normal.   Abdominal: Soft. Bowel sounds are normal.   Musculoskeletal: Normal range of motion.   Neurological: He is alert and oriented to person, place, and time.   Skin: Skin is warm. Capillary refill takes less than 2 seconds. He is not diaphoretic. There is erythema (upper left back, 6 cm area of erythema with 3 cm area of central fluctuance, scab in the very center, warm to touch).       ED Course        Incision + drainage  Date/Time: 4/12/2019 7:29 PM  Performed by: Freida Brown APRN CNP  Authorized by: Freida Brown APRN CNP     Consent:     Consent obtained:  Verbal    Consent given by:  Patient    Risks discussed:  Incomplete drainage and infection  Location:     Type:  Abscess    Size:  6 cm    Location:  Trunk    Trunk location:  Back  Pre-procedure details:     Skin preparation:  Chloraprep  Anesthesia (see MAR for exact dosages):     Anesthesia method:  Local infiltration    Local anesthetic:  Lidocaine 1% w/o epi  Procedure type:     Complexity:  Simple  Procedure details:     Needle aspiration: no      Incision types:  Single straight    Incision depth:  Subcutaneous    Scalpel blade:  11    Wound management:  Probed and deloculated    Drainage:  Purulent    Drainage amount:  Copious    Wound treatment:  Wound left open    Packing materials:  1/4 in iodoform gauze  Post-procedure details:     Patient tolerance of procedure:  Tolerated well, no immediate complications        No results found for this or any previous visit (from the past 24 hour(s)).    Medications   lidocaine 1 % 10 mL (10 mLs Intradermal Given by Other " 4/12/19 2677)       Assessments & Plan (with Medical Decision Making)  Abscess of Back,  I and D as noted above, patient tolerated well.  Will start on Bactrim, will cover for any potential of MRSA given nature of drainage.  Patient encouraged to start probiotics while on the Bactrim, keep track of blood sugars.  Patient instructed to return on Sunday for wound check and repacking, reasons to return sooner discussed in detail.  Patient did request some pain medication, small supply of Percocet provided which patient tolerated well in the past.  Patient agreeable to plan of care and discharged in stable condition.      I have reviewed the nursing notes.    I have reviewed the findings, diagnosis, plan and need for follow up with the patient.       Medication List      Started    oxyCODONE-acetaminophen 5-325 MG tablet  Commonly known as:  PERCOCET  1 tablet, Oral, EVERY 6 HOURS PRN     sulfamethoxazole-trimethoprim 800-160 MG tablet  Commonly known as:  BACTRIM DS  1 tablet, Oral, 2 TIMES DAILY        ASK your doctor about these medications    oxyCODONE 5 MG tablet  Commonly known as:  ROXICODONE  5 mg, Oral, EVERY 6 HOURS PRN  Ask about: Should I take this medication?            Final diagnoses:   Abscess of back       4/12/2019   [facility] EMERGENCY DEPARTMENT     Freida Brown, KITA CNP  04/12/19 [id number]

## 2019-04-14 ENCOUNTER — HOSPITAL ENCOUNTER (EMERGENCY)
Facility: CLINIC | Age: 39
Discharge: HOME | End: 2019-04-14
Attending: NURSE PRACTITIONER | Admitting: NURSE PRACTITIONER
Payer: COMMERCIAL

## 2019-04-14 VITALS
SYSTOLIC BLOOD PRESSURE: 147 MMHG | OXYGEN SATURATION: 99 % | RESPIRATION RATE: 16 BRPM | DIASTOLIC BLOOD PRESSURE: 86 MMHG | TEMPERATURE: 97.9 F

## 2019-04-14 DIAGNOSIS — Z51.89 ENCOUNTER FOR WOUND RE-CHECK: ICD-10-CM

## 2019-04-14 PROCEDURE — 99283 EMERGENCY DEPT VISIT LOW MDM: CPT | Performed by: NURSE PRACTITIONER

## 2019-04-14 PROCEDURE — 99283 EMERGENCY DEPT VISIT LOW MDM: CPT | Mod: Z6 | Performed by: NURSE PRACTITIONER

## 2019-04-14 ASSESSMENT — ENCOUNTER SYMPTOMS: WOUND: 1

## 2019-04-14 NOTE — ED AVS SNAPSHOT
[facility] Emergency Department  [address] [place] [address]  Phone:  [phone]  Fax:  [phone]                                    Waqas Allne   MRN: [id number]    Department:  [facility] Emergency Department   Date of Visit:  4/14/2019           After Visit Summary Signature Page    I have received my discharge instructions, and my questions have been answered. I have discussed any challenges I see with this plan with the nurse or doctor.    ..........................................................................................................................................  Patient/Patient Representative Signature      ..........................................................................................................................................  Patient Representative Print Name and Relationship to Patient    ..................................................               ................................................  Date                                   Time    ..........................................................................................................................................  Reviewed by Signature/Title    ...................................................              ..............................................  Date                                               Time          22EPIC Rev 08/18

## 2019-04-14 NOTE — ED NOTES
Pt with an abscess to his upper back w/packing in place.  Moderate amt of drainage with little erythema noted.   who saw pt previously is present and states appears much improved.  Pt is taking his abx and reports pain as cont, but tolerable.

## 2019-04-14 NOTE — ED PROVIDER NOTES
"  History     Chief Complaint   Patient presents with     Wound Check     HPI  Waqas Allen is a 39 year old male who presents to the ED today for wound check.  Patient had an abscess on his back that had an I & D done on Friday.  Patient has been taking Bactrim. Here for re-packing. Patient denies any worsening symptoms, fever, chills.     Allergies:  Allergies   Allergen Reactions     Anaprox [Naproxen Sodium]      Nausea/vomiting     Darvocet [Propoxyphene N-Apap] Other (See Comments)     \"felt like I had a hangover\".  Patient tolerates Tylenol/Acetaminophen     Hctz [Hydrochlorothiazide] Other (See Comments)     impotence     Indocin [Indomethacin] GI Disturbance     Metformin GI Disturbance     Tramadol GI Disturbance       Problem List:    Patient Active Problem List    Diagnosis Date Noted     Morbid obesity (H) 02/05/2018     Priority: Medium     AC joint arthropathy 01/06/2017     Priority: Medium     Type 2 diabetes mellitus with other specified complication, without long-term current use of insulin (H) 12/15/2015     Priority: Medium     Sprain of acromioclavicular ligament 11/25/2014     Priority: Medium     Problem list name updated by automated process. Provider to review       Hypertension goal BP (blood pressure) < 140/90 10/02/2011     Priority: Medium     Acne 06/09/2010     Priority: Medium        Past Medical History:    Past Medical History:   Diagnosis Date     Unspecified essential hypertension      Variants of migraine, not elsewhere classified, without mention of intractable migraine without mention of status migrainosus      Varicella without mention of complication 1993       Past Surgical History:    Past Surgical History:   Procedure Laterality Date     ARTHROSCOPY SHOULDER DECOMPRESSION Left 1/20/2016    Procedure: ARTHROSCOPY SHOULDER DECOMPRESSION;  Surgeon: Watson Duckworth MD;  Location: [facility] OR     ARTHROSCOPY SHOULDER DECOMPRESSION Right 2/1/2017    Procedure: ARTHROSCOPY " SHOULDER DECOMPRESSION;  Surgeon: Watson Duckworth MD;  Location: [facility] OR     ARTHROSCOPY SHOULDER DISTAL CLAVICLE REPAIR Left 1/20/2016    Procedure: ARTHROSCOPY SHOULDER DISTAL CLAVICLE RESECTION;  Surgeon: Watson Duckworth MD;  Location: [facility] OR     ARTHROSCOPY SHOULDER DISTAL CLAVICLE REPAIR Right 2/1/2017    Procedure: ARTHROSCOPY SHOULDER DISTAL CLAVICLE RESECTION;  Surgeon: Watson Duckworth MD;  Location: [facility] OR     LAMINECT/DISCECTOMY, LUMBAR  04/24/2007    Left L3-L4 Hemilaminectomy/Microdiskectomy.  [facility]       Family History:    Family History   Problem Relation Age of Onset     Hypertension Brother      Cerebrovascular Disease Maternal Grandfather      Unknown/Adopted Father        Social History:  Marital Status:  Single [1]  Social History     Tobacco Use     Smoking status: Never Smoker     Smokeless tobacco: Never Used   Substance Use Topics     Alcohol use: Yes     Alcohol/week: 0.6 - 1.2 oz     Types: 1 - 2 Standard drinks or equivalent per week     Comment: occ     Drug use: No        Medications:      ASPIRIN 81 MG OR TABS   blood glucose monitoring (NO BRAND SPECIFIED) test strip   glipiZIDE (GLUCOTROL) 10 MG tablet   losartan (COZAAR) 100 MG tablet   MELATONIN PO   methocarbamol (ROBAXIN) 750 MG tablet   MICROLET LANCETS MISC   minocycline (MINOCIN/DYNACIN) 100 MG capsule   order for DME   oxyCODONE-acetaminophen (PERCOCET) 5-325 MG tablet   pioglitazone (ACTOS) 30 MG tablet   POTASSIUM GLUCONATE PO   sildenafil (VIAGRA) 50 MG tablet   sulfamethoxazole-trimethoprim (BACTRIM DS) 800-160 MG tablet   zinc 50 MG TABS         Review of Systems   Skin: Positive for wound.   All other systems reviewed and are negative.      Physical Exam   BP: 147/86(second reading, right arm, regular long cuff)  Heart Rate: 90  Temp: 97.9  F (36.6  C)  Resp: 16  SpO2: 99 %      Physical Exam   Constitutional: He is oriented to person, place, and time. He appears well-developed and well-nourished. No  distress.   HENT:   Head: Normocephalic.   Eyes: Conjunctivae are normal.   Neck: Normal range of motion.   Cardiovascular: Normal rate.   Pulmonary/Chest: Effort normal.   Musculoskeletal: Normal range of motion.   Neurological: He is alert and oriented to person, place, and time.   Skin: Skin is warm and dry. Capillary refill takes less than 2 seconds. He is not diaphoretic.   Abscess healing very well, no erythema, packing removed, repacked without complications.  Area to left lateral portion that is indurated, no fluctuance or signs of current infection       ED Course     Procedures    No results found for this or any previous visit (from the past 24 hour(s)).    Medications - No data to display    Assessments & Plan (with Medical Decision Making)  Wound check, repacked.  Looks great.  Continue with Bactrim and wound care.  Follow up with Dr. Trent on Wednesday for packing removal and re-evaluation as scheduled.  Reasons to return discussed, patient agreeable and discharged in stable condition.      I have reviewed the nursing notes.    I have reviewed the findings, diagnosis, plan and need for follow up with the patient.       Medication List      ASK your doctor about these medications    oxyCODONE 5 MG tablet  Commonly known as:  ROXICODONE  5 mg, Oral, EVERY 6 HOURS PRN  Ask about: Should I take this medication?            Final diagnoses:   Encounter for wound re-check       4/14/2019   [facility] EMERGENCY DEPARTMENT     Freida Brown, KITA CNP  04/14/19 6090

## 2019-04-14 NOTE — DISCHARGE INSTRUCTIONS
Have packing removed on Wednesday when you see Dr. Trent.  Complete Bactrim course.  Return with any complications.

## 2019-04-16 ENCOUNTER — HOSPITAL ENCOUNTER (OUTPATIENT)
Dept: PHYSICAL THERAPY | Facility: CLINIC | Age: 39
Setting detail: THERAPIES SERIES
End: 2019-04-16
Attending: ORTHOPAEDIC SURGERY
Payer: COMMERCIAL

## 2019-04-16 DIAGNOSIS — M22.2X1 PATELLOFEMORAL PAIN SYNDROME OF RIGHT KNEE: ICD-10-CM

## 2019-04-16 PROCEDURE — 97530 THERAPEUTIC ACTIVITIES: CPT | Mod: GP

## 2019-04-16 PROCEDURE — 97161 PT EVAL LOW COMPLEX 20 MIN: CPT | Mod: GP
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
## 2019-04-17 ENCOUNTER — OFFICE VISIT (OUTPATIENT)
Dept: FAMILY MEDICINE | Facility: OTHER | Age: 39
End: 2019-04-17
Payer: COMMERCIAL

## 2019-04-17 ENCOUNTER — PATIENT OUTREACH (OUTPATIENT)
Dept: CARE COORDINATION | Facility: CLINIC | Age: 39
End: 2019-04-17

## 2019-04-17 VITALS
TEMPERATURE: 97.9 F | DIASTOLIC BLOOD PRESSURE: 70 MMHG | HEART RATE: 88 BPM | SYSTOLIC BLOOD PRESSURE: 138 MMHG | BODY MASS INDEX: 48.29 KG/M2 | OXYGEN SATURATION: 98 % | WEIGHT: 290.2 LBS | RESPIRATION RATE: 20 BRPM

## 2019-04-17 DIAGNOSIS — E66.01 MORBID OBESITY (H): ICD-10-CM

## 2019-04-17 DIAGNOSIS — L72.3 INFLAMED SEBACEOUS CYST: Primary | ICD-10-CM

## 2019-04-17 DIAGNOSIS — E11.69 TYPE 2 DIABETES MELLITUS WITH OTHER SPECIFIED COMPLICATION, WITHOUT LONG-TERM CURRENT USE OF INSULIN (H): ICD-10-CM

## 2019-04-17 DIAGNOSIS — S39.012A BACK STRAIN, INITIAL ENCOUNTER: ICD-10-CM

## 2019-04-17 PROCEDURE — 99214 OFFICE O/P EST MOD 30 MIN: CPT | Performed by: INTERNAL MEDICINE

## 2019-04-17 RX ORDER — METHOCARBAMOL 750 MG/1
750 TABLET, FILM COATED ORAL
Qty: 30 TABLET | Refills: 0 | Status: SHIPPED | OUTPATIENT
Start: 2019-04-17 | End: 2019-05-28

## 2019-04-17 RX ORDER — GLIPIZIDE 10 MG/1
TABLET ORAL
Qty: 270 TABLET | Refills: 0 | Status: SHIPPED | OUTPATIENT
Start: 2019-04-17 | End: 2019-12-17

## 2019-04-17 ASSESSMENT — PAIN SCALES - GENERAL: PAINLEVEL: SEVERE PAIN (6)

## 2019-04-17 NOTE — NURSING NOTE
Health Maintenance Due   Topic Date Due     PREVENTIVE CARE VISIT  1981     DTAP/TDAP/TD IMMUNIZATION (4 - Td) 03/22/1998     FOOT EXAM Q1 YEAR  01/09/2018     EYE EXAM Q1 YEAR  01/09/2018     INFLUENZA VACCINE (1) 09/01/2018     PHQ-2  01/01/2019     TSH W/ FREE T4 REFLEX Q2 YEAR  01/09/2019     A1C Q3 MO  03/12/2019     MICROALBUMIN Q1 YEAR  03/26/2019     Loree ANGULO LPN

## 2019-04-17 NOTE — PROGRESS NOTES
Clinic Care Coordination Contact      Patient was seen in the Emergency Department  4/17/19.  Patient followed up with PCP 4/19/19.  No Clinic Care Coordinator RN outreach at this time.

## 2019-04-17 NOTE — PROGRESS NOTES
SUBJECTIVE:   Waqas Allen is a 39 year old male who presents to clinic today for the following   health issues:        ED/UC Followup:    Facility:  [facility] emergency room  Date of visit: 4-  Reason for visit: cyst  Current Status: slight relief       Medication Followup of Robaxin    Taking Medication as prescribed: yes    Side Effects:  None    Medication Helping Symptoms:  yes                       Chief Complaint           The patient is a pleasant 39-year-old gentleman who presents today for follow-up of the sebaceous cyst on his mid upper back.  This was recently incised and drained and a wick was placed.  Bandages changed at this time and the erythema surrounding the abscess is markedly improved.  He continues to have some ongoing back discomfort which he generally gets relief by using the occasional nighttime muscle relaxer.  He has had good results with the methocarbamol.  He has type 2 diabetes his blood sugars in the past have been somewhat poorly controlled.  He notes that he is not taking the Actos at this time but has been taking the glipizide.  He was intolerant to metformin and that it caused rather significant diarrhea.                         PAST, FAMILY,SOCIAL HISTORY:     Medical  History:   has a past medical history of Unspecified essential hypertension, Variants of migraine, not elsewhere classified, without mention of intractable migraine without mention of status migrainosus, and Varicella without mention of complication (1993).     Surgical History:   has a past surgical history that includes laminect/discectomy, lumbar (04/24/2007); Arthroscopy shoulder decompression (Left, 1/20/2016); Arthroscopy shoulder distal clavicle repair (Left, 1/20/2016); Arthroscopy shoulder distal clavicle repair (Right, 2/1/2017); and Arthroscopy shoulder decompression (Right, 2/1/2017).     Social History:   reports that he has never smoked. He has never used smokeless tobacco. He  reports that he drinks about 0.6 - 1.2 oz of alcohol per week. He reports that he does not use drugs.     Family History:  family history includes Cerebrovascular Disease in his maternal grandfather; Hypertension in his brother; Unknown/Adopted in his father.            MEDICATIONS  Current Outpatient Medications   Medication Sig Dispense Refill     ASPIRIN 81 MG OR TABS ONE DAILY 100 3     blood glucose monitoring (NO BRAND SPECIFIED) test strip Use to test blood sugars 1 times daily or as directed 100 strip 3     glipiZIDE (GLUCOTROL) 10 MG tablet Take 2 tablets am, and 1 tablet pm 270 tablet 0     losartan (COZAAR) 100 MG tablet TAKE 1 TABLET BY MOUTH EVERY DAY 90 tablet 1     MELATONIN PO        methocarbamol (ROBAXIN) 750 MG tablet Take 1 tablet (750 mg) by mouth nightly as needed for muscle spasms 30 tablet 0     MICROLET LANCETS MISC Use to test blood sugars 1 time daily or as directed 100 each 3     minocycline (MINOCIN/DYNACIN) 100 MG capsule TAKE 1 CAPSULE BY MOUTH TWICE A DAY. 60 capsule 9     order for DME Equipment being ordered: glucose meter, Dexcom G5 1 Units 1     oxyCODONE-acetaminophen (PERCOCET) 5-325 MG tablet Take 1 tablet by mouth every 6 hours as needed for moderate to severe pain 12 tablet 0     POTASSIUM GLUCONATE PO Take 1 tablet by mouth daily       sildenafil (VIAGRA) 50 MG tablet Take 1 tablet (50 mg) by mouth daily as needed 30 min to 4 hrs before sex. Do not use with nitroglycerin, terazosin or doxazosin. 12 tablet 11     sulfamethoxazole-trimethoprim (BACTRIM DS) 800-160 MG tablet Take 1 tablet by mouth 2 times daily for 10 days 20 tablet 0     zinc 50 MG TABS Take 1 tablet by mouth daily       pioglitazone (ACTOS) 30 MG tablet TAKE 1 TABLET BY MOUTH ONCE DAILY (Patient not taking: Reported on 4/17/2019) 90 tablet 1         --------------------------------------------------------------------------------------------------------------------                              Review of  Systems     LUNGS: Pt denies: cough, excess sputum, hemoptysis, or shortness of breath.   HEART: Pt denies: chest pain, arrhythmia, syncope, tachy or bradyarrhythmia.   GI: Pt denies: nausea, vomiting, diarrhea, constipation, melena, or hematochezia.   NEURO: Pt denies: seizures, strokes, diplopia, weakness, paraesthesias, or paralysis.                                     Examination      /70 (BP Location: Left arm, Patient Position: Chair, Cuff Size: Adult Large)   Pulse 88   Temp 97.9  F (36.6  C) (Temporal)   Resp 20   Wt 131.6 kg (290 lb 3.2 oz)   SpO2 98%   BMI 48.29 kg/m     Constitutional: The patient appears to be in no acute distress. The patient appears to be adequately hydrated. No acute respiratory or hemodynamic distress is noted at this time.   LUNGS: clear bilaterally, airflow is brisk, no intercostal retraction or stridor is noted. No coughing is noted during visit.   HEART:  regular without rubs, clicks, gallops, or murmurs. PMI is nondisplaced. Upstrokes are brisk. S1,S2 are heard.   GI: Abdomen is soft, without rebound, guarding or tenderness. Bowel sounds are appropriate. No renal bruits are heard.  Abdomen is obese.   SKIN:  warm and dry. No erythema, or rashes are noted. No additional lesions of concern are noted.  The cyst on the back is approximately 1 inch in diameter.  No significant erythema is noted.  The drainage is still somewhat purulent.                                             Decision Making    1. Inflamed sebaceous cyst  Dressing changed.  Follow-up on Friday  Continue current antibiotic  2. Back strain, initial encounter  Continue moist heat, Robaxin sparingly at bedtime.  Oxycodone on a very limited basis.  - methocarbamol (ROBAXIN) 750 MG tablet; Take 1 tablet (750 mg) by mouth nightly as needed for muscle spasms  Dispense: 30 tablet; Refill: 0    3. Type 2 diabetes mellitus with other specified complication, without long-term current use of insulin (H)  Check A1c  and lab work fasting next visit.  Anticipate blood sugar control will be terrible.    4. Morbid obesity (H)  Recommend weight loss through caloric restriction and exercise                           FOLLOW UP   I have asked the patient to make an appointment for followup with me on Friday        I have carefully explained the diagnosis and treatment options to the patient.  The patient has displayed an understanding of the above, and all subsequent questions were answered.      DO ROSE MARY Miller    Portions of this note were produced using [employer]  Although every attempt at real-time proof reading has been made, occasional grammar/syntax errors may have been missed.

## 2019-04-18 ENCOUNTER — HOSPITAL ENCOUNTER (OUTPATIENT)
Dept: PHYSICAL THERAPY | Facility: CLINIC | Age: 39
Setting detail: THERAPIES SERIES
End: 2019-04-18
Attending: ORTHOPAEDIC SURGERY
Payer: COMMERCIAL

## 2019-04-18 PROCEDURE — 97110 THERAPEUTIC EXERCISES: CPT | Mod: GP

## 2019-04-19 ENCOUNTER — OFFICE VISIT (OUTPATIENT)
Dept: FAMILY MEDICINE | Facility: OTHER | Age: 39
End: 2019-04-19
Payer: COMMERCIAL

## 2019-04-19 VITALS
TEMPERATURE: 98.2 F | OXYGEN SATURATION: 100 % | WEIGHT: 287 LBS | HEART RATE: 86 BPM | SYSTOLIC BLOOD PRESSURE: 130 MMHG | DIASTOLIC BLOOD PRESSURE: 74 MMHG | RESPIRATION RATE: 18 BRPM | BODY MASS INDEX: 47.76 KG/M2

## 2019-04-19 DIAGNOSIS — L02.212 CUTANEOUS ABSCESS OF BACK EXCLUDING BUTTOCKS: Primary | ICD-10-CM

## 2019-04-19 PROCEDURE — 99213 OFFICE O/P EST LOW 20 MIN: CPT | Performed by: INTERNAL MEDICINE

## 2019-04-19 RX ORDER — SULFAMETHOXAZOLE/TRIMETHOPRIM 800-160 MG
1 TABLET ORAL 2 TIMES DAILY
Qty: 10 TABLET | Refills: 0 | Status: SHIPPED | OUTPATIENT
Start: 2019-04-19 | End: 2019-04-22

## 2019-04-19 ASSESSMENT — PAIN SCALES - GENERAL: PAINLEVEL: SEVERE PAIN (7)

## 2019-04-19 NOTE — PROGRESS NOTES
SUBJECTIVE:   Waqas Allen is a 39 year old male who presents to clinic today for the following   health issues:        Chief Complaint   Patient presents with     Follow Up     cyst on back                          Chief Complaint         The patient presents today for recheck of the incised sebaceous cyst/abscess on his back.  The packing is removed, there is still erythema and some purulent drainage but it is much less.  It is redressed.  He notes that he has about 3 days left of the antibiotic and I have recommended that he continues the Bactrim for additional 5 days beyond that.  He is on chronic minocycline for rosacea, he will continue that as well.  With respect to his diabetes, lab work is due but he is not fasting today.  He states that he will stop by in the near future to do it.                                             Decision Making    1. Cutaneous abscess of back excluding buttocks  Continue daily dressing changes  - sulfamethoxazole-trimethoprim (BACTRIM DS) 800-160 MG tablet; Take 1 tablet by mouth 2 times daily  Dispense: 10 tablet; Refill: 0                           FOLLOW UP   I have asked the patient to make an appointment for followup with me as needed if it does not improve otherwise.  Would like to see the patient for follow-up of his diabetes within the next month.  Lab work will be obtained in the interim.        I have carefully explained the diagnosis and treatment options to the patient.  The patient has displayed an understanding of the above, and all subsequent questions were answered.      DO ROSE MARY Miller    Portions of this note were produced using [employer]  Although every attempt at real-time proof reading has been made, occasional grammar/syntax errors may have been missed.

## 2019-04-22 ENCOUNTER — MYC REFILL (OUTPATIENT)
Dept: FAMILY MEDICINE | Facility: OTHER | Age: 39
End: 2019-04-22

## 2019-04-22 DIAGNOSIS — L02.212 CUTANEOUS ABSCESS OF BACK EXCLUDING BUTTOCKS: ICD-10-CM

## 2019-04-23 RX ORDER — SULFAMETHOXAZOLE/TRIMETHOPRIM 800-160 MG
1 TABLET ORAL 2 TIMES DAILY
Qty: 10 TABLET | Refills: 0 | Status: SHIPPED | OUTPATIENT
Start: 2019-04-23 | End: 2019-07-10

## 2019-04-26 ENCOUNTER — HOSPITAL ENCOUNTER (OUTPATIENT)
Dept: PHYSICAL THERAPY | Facility: CLINIC | Age: 39
Setting detail: THERAPIES SERIES
End: 2019-04-26
Attending: ORTHOPAEDIC SURGERY
Payer: COMMERCIAL

## 2019-04-26 PROCEDURE — 97140 MANUAL THERAPY 1/> REGIONS: CPT | Mod: GP | Performed by: PHYSICAL THERAPIST

## 2019-04-26 PROCEDURE — 97110 THERAPEUTIC EXERCISES: CPT | Mod: GP | Performed by: PHYSICAL THERAPIST

## 2019-04-30 ENCOUNTER — HOSPITAL ENCOUNTER (OUTPATIENT)
Dept: PHYSICAL THERAPY | Facility: CLINIC | Age: 39
Setting detail: THERAPIES SERIES
End: 2019-04-30
Attending: ORTHOPAEDIC SURGERY
Payer: COMMERCIAL

## 2019-04-30 PROCEDURE — 97110 THERAPEUTIC EXERCISES: CPT | Mod: GP

## 2019-05-02 ENCOUNTER — HOSPITAL ENCOUNTER (EMERGENCY)
Facility: CLINIC | Age: 39
Discharge: HOME | End: 2019-05-02
Attending: EMERGENCY MEDICINE | Admitting: EMERGENCY MEDICINE
Payer: COMMERCIAL

## 2019-05-02 ENCOUNTER — TELEPHONE (OUTPATIENT)
Dept: FAMILY MEDICINE | Facility: OTHER | Age: 39
End: 2019-05-02

## 2019-05-02 VITALS
TEMPERATURE: 98.8 F | RESPIRATION RATE: 16 BRPM | OXYGEN SATURATION: 100 % | DIASTOLIC BLOOD PRESSURE: 96 MMHG | SYSTOLIC BLOOD PRESSURE: 145 MMHG

## 2019-05-02 DIAGNOSIS — K42.9 UMBILICAL HERNIA WITHOUT OBSTRUCTION AND WITHOUT GANGRENE: ICD-10-CM

## 2019-05-02 PROCEDURE — 99284 EMERGENCY DEPT VISIT MOD MDM: CPT | Mod: Z6 | Performed by: EMERGENCY MEDICINE

## 2019-05-02 PROCEDURE — 99282 EMERGENCY DEPT VISIT SF MDM: CPT | Performed by: EMERGENCY MEDICINE

## 2019-05-02 RX ORDER — OXYCODONE HYDROCHLORIDE 5 MG/1
5 TABLET ORAL EVERY 6 HOURS PRN
Qty: 12 TABLET | Refills: 0 | Status: SHIPPED | OUTPATIENT
Start: 2019-05-02 | End: 2019-05-06

## 2019-05-02 NOTE — TELEPHONE ENCOUNTER
Called patient and left a message to call the clinic back, when he calls back please let him know that I talked to dr. segundo and he states that patient should be seen in the ED due to him might needing a surgeon.  Thank you.    Martita Sethi, CMA

## 2019-05-02 NOTE — ED AVS SNAPSHOT
[facility] Emergency Department  [address] [place] [address]  Phone:  [phone]  Fax:  [phone]                                    Waqas Allen   MRN: [id number]    Department:  [facility] Emergency Department   Date of Visit:  5/2/2019           After Visit Summary Signature Page    I have received my discharge instructions, and my questions have been answered. I have discussed any challenges I see with this plan with the nurse or doctor.    ..........................................................................................................................................  Patient/Patient Representative Signature      ..........................................................................................................................................  Patient Representative Print Name and Relationship to Patient    ..................................................               ................................................  Date                                   Time    ..........................................................................................................................................  Reviewed by Signature/Title    ...................................................              ..............................................  Date                                               Time          22EPIC Rev 08/18

## 2019-05-02 NOTE — TELEPHONE ENCOUNTER
Reason for Call:  Other appointment    Detailed comments: Would like an appointment today as he is in severe pain due to his hernia.    Phone Number Patient can be reached at: Cell number on file:    Telephone Information:   Mobile [phone]    or Other phone number:    Best Time: any     Can we leave a detailed message on this number? YES    Call taken on 5/2/2019 at 10:43 AM by Tracy Rice

## 2019-05-02 NOTE — ED PROVIDER NOTES
"  History     Chief Complaint   Patient presents with     Hernia     The history is provided by the patient.     Waqas Allen is a 39 year old male who presents to the emergency department for a hernia. Patient reports having an umbilical hernia for 1-2 years. He states it has been increasing in pain for the past 2 days. He reports that it has doubled in size in the last couple days. It is gradually getting worse. He states it is \"sticking\" out all the time. He states bending, coughing or sneezing makes the pain worse. He denies lifting anything heavy recently.    Allergies:  Allergies   Allergen Reactions     Anaprox [Naproxen Sodium]      Nausea/vomiting     Darvocet [Propoxyphene N-Apap] Other (See Comments)     \"felt like I had a hangover\".  Patient tolerates Tylenol/Acetaminophen     Hctz [Hydrochlorothiazide] Other (See Comments)     impotence     Indocin [Indomethacin] GI Disturbance     Metformin GI Disturbance     Tramadol GI Disturbance       Problem List:    Patient Active Problem List    Diagnosis Date Noted     Morbid obesity (H) 02/05/2018     Priority: Medium     AC joint arthropathy 01/06/2017     Priority: Medium     Type 2 diabetes mellitus with other specified complication, without long-term current use of insulin (H) 12/15/2015     Priority: Medium     Sprain of acromioclavicular ligament 11/25/2014     Priority: Medium     Problem list name updated by automated process. Provider to review       Hypertension goal BP (blood pressure) < 140/90 10/02/2011     Priority: Medium     Acne 06/09/2010     Priority: Medium        Past Medical History:    Past Medical History:   Diagnosis Date     Arthritis      Diabetes (H) 2013     Unspecified essential hypertension      Variants of migraine, not elsewhere classified, without mention of intractable migraine without mention of status migrainosus      Varicella without mention of complication 1993       Past Surgical History:    Past Surgical History: "   Procedure Laterality Date     ARTHROSCOPY SHOULDER DECOMPRESSION Left 1/20/2016    Procedure: ARTHROSCOPY SHOULDER DECOMPRESSION;  Surgeon: Watson Duckworth MD;  Location: [facility] OR     ARTHROSCOPY SHOULDER DECOMPRESSION Right 2/1/2017    Procedure: ARTHROSCOPY SHOULDER DECOMPRESSION;  Surgeon: Watson Duckworth MD;  Location: [facility] OR     ARTHROSCOPY SHOULDER DISTAL CLAVICLE REPAIR Left 1/20/2016    Procedure: ARTHROSCOPY SHOULDER DISTAL CLAVICLE RESECTION;  Surgeon: Watson Duckworth MD;  Location: [facility] OR     ARTHROSCOPY SHOULDER DISTAL CLAVICLE REPAIR Right 2/1/2017    Procedure: ARTHROSCOPY SHOULDER DISTAL CLAVICLE RESECTION;  Surgeon: Watson Duckworth MD;  Location: [facility] OR     LAMINECT/DISCECTOMY, LUMBAR  04/24/2007    Left L3-L4 Hemilaminectomy/Microdiskectomy.  [facility]       Family History:    Family History   Problem Relation Age of Onset     Hypertension Brother      Cerebrovascular Disease Maternal Grandfather      Unknown/Adopted Father      Hypertension Father      Hypertension Brother        Social History:  Marital Status:  Single [1]  Social History     Tobacco Use     Smoking status: Never Smoker     Smokeless tobacco: Never Used   Substance Use Topics     Alcohol use: Yes     Alcohol/week: 0.6 - 1.2 oz     Comment: 1 drink a day     Drug use: No        Medications:      oxyCODONE (ROXICODONE) 5 MG tablet   ASPIRIN 81 MG OR TABS   blood glucose monitoring (NO BRAND SPECIFIED) test strip   glipiZIDE (GLUCOTROL) 10 MG tablet   losartan (COZAAR) 100 MG tablet   MELATONIN PO   methocarbamol (ROBAXIN) 750 MG tablet   MICROLET LANCETS MISC   minocycline (MINOCIN/DYNACIN) 100 MG capsule   order for DME   oxyCODONE-acetaminophen (PERCOCET) 5-325 MG tablet   pioglitazone (ACTOS) 30 MG tablet   POTASSIUM GLUCONATE PO   sildenafil (VIAGRA) 50 MG tablet   sulfamethoxazole-trimethoprim (BACTRIM DS) 800-160 MG tablet   zinc 50 MG TABS         Review of Systems   All other systems reviewed and are  negative.      Physical Exam   BP: (!) 145/96(right arm, regular long cuff)  Heart Rate: 98  Temp: 98.8  F (37.1  C)  Resp: 16  SpO2: 100 %      Physical Exam   Constitutional: He is oriented to person, place, and time. He appears well-developed. No distress.   Morbidly obese   HENT:   Head: Normocephalic and atraumatic.   Eyes: Pupils are equal, round, and reactive to light. No scleral icterus.   Neck: Normal range of motion. Neck supple.   Cardiovascular: Normal rate.   Pulmonary/Chest: Effort normal.   Abdominal: Soft. He exhibits no distension and no mass. There is tenderness. There is no rebound and no guarding.   Tenderness to palpation over the umbilicus.  There is a small mass in the umbilicus.  It is mildly to moderately tender.  There is no erythema.  There is no reduction of the hernia possible.   Musculoskeletal: Normal range of motion.   Neurological: He is alert and oriented to person, place, and time.   Skin: Skin is warm and dry. No rash noted. He is not diaphoretic.   Psychiatric: He has a normal mood and affect. His behavior is normal. Thought content normal.   Nursing note and vitals reviewed.      ED Course        Procedures                 No results found for this or any previous visit (from the past 24 hour(s)).    Medications - No data to display    Assessments & Plan (with Medical Decision Making)  Umbilical hernia.  I do not believe it is incarcerated.  It usually is out and he states that is just a little bit worse than usual.  There was not an abrupt onset of severe umbilical pain.  He has previously seen surgery but would like a new referral.  He declined a CT scan at this time.  He understands the risk of possible incarceration but would like to proceed with a few pain pills and follow-up with surgery.  Return to ER precautions were discussed.     I have reviewed the nursing notes.    I have reviewed the findings, diagnosis, plan and need for follow up with the patient.          Medication List      Started    oxyCODONE 5 MG tablet  Commonly known as:  ROXICODONE  5 mg, Oral, EVERY 6 HOURS PRN            Final diagnoses:   Umbilical hernia without obstruction and without gangrene     This document serves as a record of services personally performed by Bernardo Ham MD. It was created on their behalf by Sury Ascencio, a trained medical scribe. The creation of this record is based on the provider's personal observations and the statements of the patient. This document has been checked and approved by the attending provider.    Note: Chart documentation done in part with Dragon Voice Recognition software. Although reviewed after completion, some word and grammatical errors may remain.    5/2/2019   [facility] EMERGENCY DEPARTMENT     Chele Ham MD  05/02/19 2029

## 2019-05-03 ENCOUNTER — PATIENT OUTREACH (OUTPATIENT)
Dept: CARE COORDINATION | Facility: CLINIC | Age: 39
End: 2019-05-03

## 2019-05-03 ASSESSMENT — ACTIVITIES OF DAILY LIVING (ADL): DEPENDENT_IADLS:: INDEPENDENT

## 2019-05-03 NOTE — LETTER
[facility] CARE COORDINATION  [address] [facility] [address]    May 3, 2019    Waqas Allen  [address] [facility] [address]      Dear Waqas,    I am a clinic care coordinator who works with Chance Trent DO at [facility]. I have been trying to reach you recently to introduce Clinic Care Coordination and to see if there was anything I could assist you with.  I wanted to introduce myself and provide you with my contact information so that you can call me with questions or concerns about your health care. Below is a description of clinic care coordination and how I can further assist you.     The clinic care coordinator is a registered nurse and/or  who understand the health care system. The goal of clinic care coordination is to help you manage your health and improve access to the [facility] system in the most efficient manner. The registered nurse can assist you in meeting your health care goals by providing education, coordinating services, and strengthening the communication among your providers. The  can assist you with financial, behavioral, psychosocial, chemical dependency, counseling, and/or psychiatric resources.    Please feel free to contact me at [phone], with any questions or concerns. We at [facility] are focused on providing you with the highest-quality healthcare experience possible and that all starts with you.     Sincerely,     Yamilex CHAVEZ, RN, PHN, [facility]  Primary Care Clinic RN Care Coordinator  [facility]   [email or web address]  Office:  [phone]      Enclosed: I have enclosed a copy of a 24 Hour Access Plan. This has helpful phone numbers for you to call when needed. Please keep this in an easy to access place to use as needed.

## 2019-05-03 NOTE — PROGRESS NOTES
Clinic Care Coordination Contact  [facility]/Voicemail    Referral Source: ED Follow-Up    Clinical Data: Care Coordinator Outreach, ED f/u 5/2/19 umbilical hernia, has surgery consult Tuesday 5/7/19.     Outreach attempted x 1.  Left message on voicemail with call back information and requested return call.    Plan: Care Coordinator will mail out care coordination introduction letter with care coordinator contact information and explanation of care coordination services. Care Coordinator will try to reach patient again in 1-2 business days.    Yamilex CHAVEZ, RN, PHN, [facility]  Primary Care Clinic RN Care Coordinator  [facility]   [email or web address]  Office:  [phone]

## 2019-05-03 NOTE — LETTER
Health Care Home - Access Care Plan    About Me:    Patient Name:  Waqas Allen    YOB: 1980  Age:                      39 year old   Kermit MRN:     [id number] Telephone Information:   Home Phone [phone]   Mobile [phone]       Address:  [address] [place] [address] Email address:  [email or web address]      Emergency Contact(s)   Name Relationship Lgl Grd Work Phone Home Phone Mobile Phone   1. NANO FINN* Significant ot* No  [phone]   2. FATOU ALLEN Mother  none [phone] none             Health Maintenance: Routine Health maintenance Reviewed: Due/Overdue   Health Maintenance Due   Topic Date Due     PREVENTIVE CARE VISIT  1980     DTAP/TDAP/TD IMMUNIZATION (4 - Td) 03/22/1998     FOOT EXAM Q1 YEAR  01/09/2018     EYE EXAM Q1 YEAR  01/09/2018     TSH W/ FREE T4 REFLEX Q2 YEAR  01/09/2019     A1C Q3 MO  03/12/2019     MICROALBUMIN Q1 YEAR  03/26/2019     Pt to talk with provider about what is needed or can continue wait.        My Access Plan  Medical Emergency 918   Questions or concerns during clinic hours Primary Clinic Line, I will call the clinic directly: [facility] - [phone]   24 Hour Appointment Line [phone] or  [phone]) (toll free)   24 Hour Nurse Line [phone] (toll free)   Questions or concerns outside clinic hours 24 Hour Appointment Line, I will call the after-hours on-call line:   [facility] [phone] or [phone]) (toll-free)   Preferred Urgent Care     Preferred Hospital [facility]  [phone]   Preferred Pharmacy [facility] [place], [place] - [address]      Behavioral Health Crisis Line The National Suicide Prevention Lifeline at 1-175.590.3298 or 911                     My Care Team Members  Patient Care Team       Relationship Specialty Notifications Start End    Chance Trent DO PCP -  General Internal Medicine  7/6/18     Phone: [phone] Fax: [phone] [address] [facility] [address]    Chance Trent DO Assigned PCP   7/8/18     Phone: [phone] Fax: [phone] [address] [facility] [address]    Yamilex Nassar, RN Clinic Care Coordinator Primary Care - CC Admissions 5/3/19     Phone: [phone] Fax: [phone]               My Medical and Care Information  Problem List   Patient Active Problem List   Diagnosis     Acne     Hypertension goal BP (blood pressure) < 140/90     Sprain of acromioclavicular ligament     Type 2 diabetes mellitus with other specified complication, without long-term current use of insulin (H)     AC joint arthropathy     Morbid obesity (H)      Current Medications and Allergies:  See printed Medication Report

## 2019-05-05 ENCOUNTER — MYC MEDICAL ADVICE (OUTPATIENT)
Dept: FAMILY MEDICINE | Facility: OTHER | Age: 39
End: 2019-05-05

## 2019-05-05 ENCOUNTER — MYC MEDICAL ADVICE (OUTPATIENT)
Dept: SURGERY | Facility: CLINIC | Age: 39
End: 2019-05-05

## 2019-05-05 DIAGNOSIS — L02.212 CUTANEOUS ABSCESS OF BACK EXCLUDING BUTTOCKS: Primary | ICD-10-CM

## 2019-05-06 RX ORDER — AMOXICILLIN AND CLAVULANATE POTASSIUM 500; 125 MG/1; MG/1
1 TABLET, FILM COATED ORAL 2 TIMES DAILY
Qty: 14 TABLET | Refills: 0 | Status: SHIPPED | OUTPATIENT
Start: 2019-05-06 | End: 2019-07-10

## 2019-05-06 ASSESSMENT — ACTIVITIES OF DAILY LIVING (ADL): DEPENDENT_IADLS:: INDEPENDENT

## 2019-05-06 NOTE — PROGRESS NOTES
Clinic Care Coordination Contact    Clinic Care Coordination Contact  OUTREACH    Referral Information:  Referral Source: ED Follow-Up    Primary Diagnosis: GI Disorders    Chief Complaint   Patient presents with     ER F/U     Nurse: ED f/u 5/2/19        Universal Utilization: Pt has had 5 ED/Hospital visits in past 90 days;  Pt did trigger high for risk of readmission-ED/hospital utilization.  Clinic Utilization  Difficulty keeping appointments:: No  Compliance Concerns: No  No-Show Concerns: No  No PCP office visit in Past Year: No  Utilization    Last refreshed: 5/6/2019  8:46 AM:  Hospital Admissions 0           Last refreshed: 5/6/2019  8:46 AM:  ED Visits 5           Last refreshed: 5/6/2019  8:46 AM:  No Show Count (past year) 3              Current as of: 5/6/2019  8:46 AM              Clinical Concerns:  Care Coordinator RN called patient back, he denies having any questions or concerns regarding his ER visit. He is scheduled to see the surgeon tomorrow. He did voice his concern about a different surgeon that he saw twice before making comments about his weight. Care Coordinator RN suggested making a complaint about him when he is at the clinic tomorrow, he verbalized he probably will.    Current Medical Concerns:    Patient Active Problem List   Diagnosis     Acne     Hypertension goal BP (blood pressure) < 140/90     Sprain of acromioclavicular ligament     Type 2 diabetes mellitus with other specified complication, without long-term current use of insulin (H)     AC joint arthropathy     Morbid obesity (H)         Current Behavioral Concerns: Denies having any concerns.     Education Provided to patient: RN CC educated about Care Coordination Services, discharge instructions, medications reviewed and follow up.         Health Maintenance Reviewed: Due/Overdue   Health Maintenance Due   Topic Date Due     PREVENTIVE CARE VISIT  1981     DTAP/TDAP/TD IMMUNIZATION (4 - Td) 03/22/1998     FOOT EXAM Q1  YEAR  01/09/2018     EYE EXAM Q1 YEAR  01/09/2018     TSH W/ FREE T4 REFLEX Q2 YEAR  01/09/2019     A1C Q3 MO  03/12/2019     MICROALBUMIN Q1 YEAR  03/26/2019       Clinical Pathway: None    Medication Management:  Patient independent in medication management and verbalizes adherence and understanding of medication regimen.        Functional Status:  Dependent ADLs:: Independent  Dependent IADLs:: Independent  Bed or wheelchair confined:: No  Mobility Status: Independent    Living Situation:  Current living arrangement:: Not Assessed    Diet/Exercise/Sleep:  Tube Feeding: No    Transportation:  Transportation concerns (GOAL):: No  Transportation means:: Regular car     Psychosocial:  Mental health DX:: No  Mental health management concern (GOAL):: No  Informal Support system:: Family, Friends     Financial/Insurance:   Denies having any concerns. He owns his own business.      Resources and Interventions:  Current Resources: RN CC mailed out to patient intro letter, access care plan and care coordination services brochure previously.  Supplies used at home:: Diabetic Supplies  Equipment Currently Used at Home: glucometer    Advance Care Plan/Directive  Advanced Care Plans/Directives on file:: No    Referrals Placed: None       Future Appointments              Tomorrow Longoria, Mundo-Karin, MD [facility]    Tomorrow Dhara Schwartz, PT [facility], [facility]    In 3 days Dhara Schwartz, PT [facility]    In 1 week Dhara Schwartz, PT [facility], [facility]    In 1 week Dhara Schwartz, PT [facility], [facility]          Plan:   No further Care Coordination needs identified at this time. Patient may be referred to Care Coordination in the future if additional needs arise.  Pt encouraged to contact Care Coordinator through the clinic if situation changes and assistance is  needed. No follow-up planned.    Yamilex CHAVEZ, RN, PHN, [facility]  Primary Care Clinic RN Care Coordinator  [facility]   [email or web address]  Office:  [phone]

## 2019-05-06 NOTE — PROGRESS NOTES
Clinic Care Coordination Contact  Care Team Conversations    Patient left a message for Care Coordinator RN stating he was returning her call and not sure what Care Coordinator RN was calling about.    Plan:  Care Coordinator RN to call patient back.    Yamilex CHAVEZ RN, PHN, [facility]  Primary Care Clinic RN Care Coordinator  [facility]   [email or web address]  Office:  [phone]

## 2019-05-06 NOTE — PROGRESS NOTES
Clinic Care Coordination Contact  [facility]/Voicemail    Referral Source: ED Follow-Up    Clinical Data: Care Coordinator Outreach, ED f/u 5/2/19 umbilical hernia, has surgery consult Tuesday 5/7/19.       Outreach attempted x 2.  Left message on voicemail with call back information and requested return call.    Plan: Care Coordinator mailed out care coordination introduction letter on 5/3/19. Care Coordinator will do no further outreaches at this time.    Yamilex CHAVEZ, RN, PHN, [facility]  Primary Care Clinic RN Care Coordinator  [facility]   [email or web address]  Office:  [phone]

## 2019-05-07 ENCOUNTER — OFFICE VISIT (OUTPATIENT)
Dept: SURGERY | Facility: CLINIC | Age: 39
End: 2019-05-07
Payer: COMMERCIAL

## 2019-05-07 ENCOUNTER — PATIENT OUTREACH (OUTPATIENT)
Dept: CARE COORDINATION | Facility: CLINIC | Age: 39
End: 2019-05-07

## 2019-05-07 ENCOUNTER — HOSPITAL ENCOUNTER (OUTPATIENT)
Dept: PHYSICAL THERAPY | Facility: CLINIC | Age: 39
Setting detail: THERAPIES SERIES
End: 2019-05-07
Attending: ORTHOPAEDIC SURGERY
Payer: COMMERCIAL

## 2019-05-07 VITALS — BODY MASS INDEX: 48.64 KG/M2 | DIASTOLIC BLOOD PRESSURE: 90 MMHG | WEIGHT: 292.3 LBS | SYSTOLIC BLOOD PRESSURE: 138 MMHG

## 2019-05-07 DIAGNOSIS — E66.01 MORBID OBESITY (H): ICD-10-CM

## 2019-05-07 DIAGNOSIS — R73.09 ELEVATED HEMOGLOBIN A1C: ICD-10-CM

## 2019-05-07 DIAGNOSIS — I10 HYPERTENSION GOAL BP (BLOOD PRESSURE) < 140/90: ICD-10-CM

## 2019-05-07 DIAGNOSIS — E11.69 TYPE 2 DIABETES MELLITUS WITH OTHER SPECIFIED COMPLICATION, WITHOUT LONG-TERM CURRENT USE OF INSULIN (H): ICD-10-CM

## 2019-05-07 DIAGNOSIS — K42.9 UMBILICAL HERNIA WITHOUT OBSTRUCTION AND WITHOUT GANGRENE: Primary | ICD-10-CM

## 2019-05-07 PROCEDURE — 99214 OFFICE O/P EST MOD 30 MIN: CPT | Performed by: SURGERY

## 2019-05-07 PROCEDURE — 97140 MANUAL THERAPY 1/> REGIONS: CPT | Mod: GP

## 2019-05-07 PROCEDURE — 97110 THERAPEUTIC EXERCISES: CPT | Mod: GP

## 2019-05-07 ASSESSMENT — PAIN SCALES - GENERAL: PAINLEVEL: SEVERE PAIN (7)

## 2019-05-07 ASSESSMENT — ACTIVITIES OF DAILY LIVING (ADL): DEPENDENT_IADLS:: INDEPENDENT

## 2019-05-07 NOTE — PROGRESS NOTES
General Surgery Consultation    Waqas Allen MRN# [id number]   Age: 39 year old YOB: 1980     Reason for consult: Umbilical hernia                        Assessment and Plan:   I was asked to see this patient at the request of Dr. Trent for evaluation of symptomatic umbilical hernia.  Waqas Allen is a 39 year old male who presented with history, exam, laboratory and imaging most consistent with:        ICD-10-CM    1. Umbilical hernia without obstruction and without gangrene K42.9    2. Morbid obesity (H) E66.01    3. Type 2 diabetes mellitus with other specified complication, without long-term current use of insulin (H) E11.69    4. Hypertension goal BP (blood pressure) < 140/90 I10    5. Elevated hemoglobin A1c R73.09      Chris has a small reducible umbilical hernia that is causing him a lot of pain.  Easily reducible in the office with no signs of pain.  I do not recommend surgical management of this hernia at this time.  I do recommend that patient gets control of his diabetes and to lose at least 30 pounds prior to reevaluating for possible elective repair of this hernia.  I discussed with the patient that his rate of complications after the hernia repair is 35%.  Thus I recommend watchful waiting and unless the hernia is causing obstructive symptoms or a strangulated I do not recommend surgical management electively until his diabetes is better controlled and he lose some weight.  Patient was not happy with the visit and my recommendations; and walked out towards the end.    I thank Dr. Trent for the opportunity to participate in the patient's care.           Chief Complaint:   Umbilical hernia     History is obtained from the patient         History of Present Illness:   This patient is a 39 year old male with a significant past medical history of HLD, diabetes, hypertension and morbid obesity who presents with a small umbilical hernia.  Chris was seen by my partner Dr. Teresa  back in December for this hernia; ended up not repairing the hernia due to his comorbidities.  Patient presents again today for a second opinion due to increasing pain and stated increasing size of the umbilical hernia.  Seen in the ER for the pain and was told to follow-up with me today.  Has diabetes and is on medication however is questionable if he is compliant with his medications.  His last hemoglobin A1c was 12.6.  Patient stated that he does not believe that he is a diabetic.  Have not repeat his ordered labs for diabetes.  Also has hyperlipidemia and is morbidly obese with BMI of 48.6.  Denies any previous abdominal surgeries.  He has had no abdominal issues besides pain around this hernia.  Nausea no vomiting normal bowel movements.  Denies history of a heart attack.  Denies history of a stroke.            Past Medical History:    has a past medical history of Arthritis, Diabetes (H) (2013), Unspecified essential hypertension, Variants of migraine, not elsewhere classified, without mention of intractable migraine without mention of status migrainosus, and Varicella without mention of complication (1993).          Past Surgical History:     Past Surgical History:   Procedure Laterality Date     ARTHROSCOPY SHOULDER DECOMPRESSION Left 1/20/2016    Procedure: ARTHROSCOPY SHOULDER DECOMPRESSION;  Surgeon: Watson Duckworth MD;  [facility]     ARTHROSCOPY SHOULDER DECOMPRESSION Right 2/1/2017    Procedure: ARTHROSCOPY SHOULDER DECOMPRESSION;  Surgeon: Watson Duckworth MD;  [facility]     ARTHROSCOPY SHOULDER DISTAL CLAVICLE REPAIR Left 1/20/2016    Procedure: ARTHROSCOPY SHOULDER DISTAL CLAVICLE RESECTION;  Surgeon: Watson Duckworth MD;  [facility]     ARTHROSCOPY SHOULDER DISTAL CLAVICLE REPAIR Right 2/1/2017    Procedure: ARTHROSCOPY SHOULDER DISTAL CLAVICLE RESECTION;  Surgeon: Watson Duckworth MD;  [facility]     LAMINECT/DISCECTOMY, LUMBAR  04/24/2007    Left L3-L4  "Hemilaminectomy/Microdiskectomy.  [facility]           Medications:     Current Outpatient Medications on File Prior to Visit:  amoxicillin-clavulanate (AUGMENTIN) 500-125 MG tablet Take 1 tablet by mouth 2 times daily   ASPIRIN 81 MG OR TABS ONE DAILY   blood glucose monitoring (NO BRAND SPECIFIED) test strip Use to test blood sugars 1 times daily or as directed   glipiZIDE (GLUCOTROL) 10 MG tablet Take 2 tablets am, and 1 tablet pm   losartan (COZAAR) 100 MG tablet TAKE 1 TABLET BY MOUTH EVERY DAY   MELATONIN PO    methocarbamol (ROBAXIN) 750 MG tablet Take 1 tablet (750 mg) by mouth nightly as needed for muscle spasms   MICROLET LANCETS MISC Use to test blood sugars 1 time daily or as directed   minocycline (MINOCIN/DYNACIN) 100 MG capsule TAKE 1 CAPSULE BY MOUTH TWICE A DAY.   order for DME Equipment being ordered: glucose meter, Dexcom G5   pioglitazone (ACTOS) 30 MG tablet TAKE 1 TABLET BY MOUTH ONCE DAILY   POTASSIUM GLUCONATE PO Take 1 tablet by mouth daily   sildenafil (VIAGRA) 50 MG tablet Take 1 tablet (50 mg) by mouth daily as needed 30 min to 4 hrs before sex. Do not use with nitroglycerin, terazosin or doxazosin.   sulfamethoxazole-trimethoprim (BACTRIM DS) 800-160 MG tablet Take 1 tablet by mouth 2 times daily   zinc 50 MG TABS Take 1 tablet by mouth daily     No current facility-administered medications on file prior to visit.       Allergies:      Allergies   Allergen Reactions     Anaprox [Naproxen Sodium]      Nausea/vomiting     Darvocet [Propoxyphene N-Apap] Other (See Comments)     \"felt like I had a hangover\".  Patient tolerates Tylenol/Acetaminophen     Hctz [Hydrochlorothiazide] Other (See Comments)     impotence     Indocin [Indomethacin] GI Disturbance     Metformin GI Disturbance     Tramadol GI Disturbance            Social History:   Waqas Allen  reports that he has never smoked. He has never used smokeless tobacco. He reports that he drinks about 0.6 - 1.2 oz of alcohol per " week. He reports that he does not use drugs.          Family History:   The patient has no family history of any bleeding, clotting or anesthesia problems.          Review of Systems:     Constitutional: Denies fever or chills   Eyes: Denies change in visual acuity   HENT: Denies nasal congestion or sore throat   Respiratory: Denies cough or shortness of breath   Cardiovascular: Denies chest pain or edema   GI: Denies nausea, vomiting, bloody stools or diarrhea; +abdominal pain,   : Denies dysuria   Musculoskeletal: Denies back pain or joint pain   Integument: Denies rash   Neurologic: Denies headache, focal weakness or sensory changes   Endocrine: Denies polyuria or polydipsia   Lymphatic: Denies swollen glands   Psychiatric: Denies depression or anxiety          Physical Exam:     Constitutional: Awake, alert, no acute distress.  Eyes:  No scleral icterus.  Conjunctiva are without injection.  ENMT: Mucous membranes moist, dentition and gums are intact.   Neck: Soft, supple, trachea midline.    Endocrine: The thyroid is without masses and mobile with swallow.   Lymphatic: There is no cervical, submandibular adenopathy.  Respiratory: Lungs are clear to auscultation and percussion bilaterally.   Cardiovascular: Regular rate and rhythm. No murmurs, rubs, or gallops.    Abdomen: Non-distended, non-tender, normoactive bowel sounds present, No masses, 1cm reducible umbilical hernia with likely preperitoneal fat.  No hepatosplenomegaly.   Musculoskeletal: No spinal or CVA tenderness. Full range of motion in the upper and lower extremities.    Skin: No skin rashes or lesions to inspection.  No petechia.    Neurologic: Cranial nerves II through XII are grossly intact and symmetric.  Psychiatric: The patient is alert and oriented times 3.  The patient's affect is not blunted and mood is appropriate.          Data:   Vital Signs:  /90   Wt 132.6 kg (292 lb 4.8 oz)   BMI 48.64 kg/m       WBC -   WBC   Date Value Ref  Range Status   12/17/2017 9.4 4.0 - 11.0 10e9/L Final   ], HgB -   Hemoglobin   Date Value Ref Range Status   12/17/2017 15.6 13.3 - 17.7 g/dL Final   ]   Liver Function Studies -   Recent Labs   Lab Test 12/12/18  0936   PROTTOTAL 7.1   ALBUMIN 3.2*   BILITOTAL 0.5   ALKPHOS 125   AST 22   ALT 37     No results found for this or any previous visit (from the past 744 hour(s)).     [facility] DO Swati 5/7/2019 9:39 AM

## 2019-05-07 NOTE — LETTER
5/7/2019         RE: Waqas Allen  [address] [facility] [address]        Dear Colleague,    Thank you for referring your patient, Waqas Allen, to the [facility]. Please see a copy of my visit note below.    Chris to follow up with Primary Care provider regarding elevated blood pressure.    Ant/SPENCER     General Surgery Consultation    Waqas Allen MRN# [id number]   Age: 39 year old YOB: 1980     Reason for consult: Umbilical hernia                        Assessment and Plan:   I was asked to see this patient at the request of Dr. Trent for evaluation of symptomatic umbilical hernia.  Waqas Allen is a 39 year old male who presented with history, exam, laboratory and imaging most consistent with:        ICD-10-CM    1. Umbilical hernia without obstruction and without gangrene K42.9    2. Morbid obesity (H) E66.01    3. Type 2 diabetes mellitus with other specified complication, without long-term current use of insulin (H) E11.69    4. Hypertension goal BP (blood pressure) < 140/90 I10    5. Elevated hemoglobin A1c R73.09      Chris has a small reducible umbilical hernia that is causing him a lot of pain.  Easily reducible in the office with no signs of pain.  I do not recommend surgical management of this hernia at this time.  I do recommend that patient gets control of his diabetes and to lose at least 30 pounds prior to reevaluating for possible elective repair of this hernia.  I discussed with the patient that his rate of complications after the hernia repair is 35%.  Thus I recommend watchful waiting and unless the hernia is causing obstructive symptoms or a strangulated I do not recommend surgical management electively until his diabetes is better controlled and he lose some weight.  Patient was not happy with the visit and my recommendations; and walked out towards the end.    I thank Dr. Trent for the opportunity to participate in the patient's  care.           Chief Complaint:   Umbilical hernia     History is obtained from the patient         History of Present Illness:   This patient is a 39 year old male with a significant past medical history of HLD, diabetes, hypertension and morbid obesity who presents with a small umbilical hernia.  Chris was seen by my partner Dr. Teresa back in December for this hernia; ended up not repairing the hernia due to his comorbidities.  Patient presents again today for a second opinion due to increasing pain and stated increasing size of the umbilical hernia.  Seen in the ER for the pain and was told to follow-up with me today.  Has diabetes and is on medication however is questionable if he is compliant with his medications.  His last hemoglobin A1c was 12.6.  Patient stated that he does not believe that he is a diabetic.  Have not repeat his ordered labs for diabetes.  Also has hyperlipidemia and is morbidly obese with BMI of 48.6.  Denies any previous abdominal surgeries.  He has had no abdominal issues besides pain around this hernia.  Nausea no vomiting normal bowel movements.  Denies history of a heart attack.  Denies history of a stroke.            Past Medical History:    has a past medical history of Arthritis, Diabetes (H) (2013), Unspecified essential hypertension, Variants of migraine, not elsewhere classified, without mention of intractable migraine without mention of status migrainosus, and Varicella without mention of complication (1993).          Past Surgical History:     Past Surgical History:   Procedure Laterality Date     ARTHROSCOPY SHOULDER DECOMPRESSION Left 1/20/2016    Procedure: ARTHROSCOPY SHOULDER DECOMPRESSION;  Surgeon: Watson Duckworth MD;  [facility]     ARTHROSCOPY SHOULDER DECOMPRESSION Right 2/1/2017    Procedure: ARTHROSCOPY SHOULDER DECOMPRESSION;  Surgeon: Watson Duckworth MD;  [facility]     ARTHROSCOPY SHOULDER DISTAL CLAVICLE REPAIR Left 1/20/2016     "Procedure: ARTHROSCOPY SHOULDER DISTAL CLAVICLE RESECTION;  Surgeon: Watson Duckworth MD;  [facility]     ARTHROSCOPY SHOULDER DISTAL CLAVICLE REPAIR Right 2/1/2017    Procedure: ARTHROSCOPY SHOULDER DISTAL CLAVICLE RESECTION;  Surgeon: Watson Duckworth MD;  [facility]     LAMINECT/DISCECTOMY, LUMBAR  04/24/2007    Left L3-L4 Hemilaminectomy/Microdiskectomy.  [facility]           Medications:     Current Outpatient Medications on File Prior to Visit:  amoxicillin-clavulanate (AUGMENTIN) 500-125 MG tablet Take 1 tablet by mouth 2 times daily   ASPIRIN 81 MG OR TABS ONE DAILY   blood glucose monitoring (NO BRAND SPECIFIED) test strip Use to test blood sugars 1 times daily or as directed   glipiZIDE (GLUCOTROL) 10 MG tablet Take 2 tablets am, and 1 tablet pm   losartan (COZAAR) 100 MG tablet TAKE 1 TABLET BY MOUTH EVERY DAY   MELATONIN PO    methocarbamol (ROBAXIN) 750 MG tablet Take 1 tablet (750 mg) by mouth nightly as needed for muscle spasms   MICROLET LANCETS MISC Use to test blood sugars 1 time daily or as directed   minocycline (MINOCIN/DYNACIN) 100 MG capsule TAKE 1 CAPSULE BY MOUTH TWICE A DAY.   order for DME Equipment being ordered: glucose meter, Dexcom G5   pioglitazone (ACTOS) 30 MG tablet TAKE 1 TABLET BY MOUTH ONCE DAILY   POTASSIUM GLUCONATE PO Take 1 tablet by mouth daily   sildenafil (VIAGRA) 50 MG tablet Take 1 tablet (50 mg) by mouth daily as needed 30 min to 4 hrs before sex. Do not use with nitroglycerin, terazosin or doxazosin.   sulfamethoxazole-trimethoprim (BACTRIM DS) 800-160 MG tablet Take 1 tablet by mouth 2 times daily   zinc 50 MG TABS Take 1 tablet by mouth daily     No current facility-administered medications on file prior to visit.       Allergies:      Allergies   Allergen Reactions     Anaprox [Naproxen Sodium]      Nausea/vomiting     Darvocet [Propoxyphene N-Apap] Other (See Comments)     \"felt like I had a hangover\".  Patient tolerates Tylenol/Acetaminophen "     Hctz [Hydrochlorothiazide] Other (See Comments)     impotence     Indocin [Indomethacin] GI Disturbance     Metformin GI Disturbance     Tramadol GI Disturbance            Social History:   Waqas Allen  reports that he has never smoked. He has never used smokeless tobacco. He reports that he drinks about 0.6 - 1.2 oz of alcohol per week. He reports that he does not use drugs.          Family History:   The patient has no family history of any bleeding, clotting or anesthesia problems.          Review of Systems:     Constitutional: Denies fever or chills   Eyes: Denies change in visual acuity   HENT: Denies nasal congestion or sore throat   Respiratory: Denies cough or shortness of breath   Cardiovascular: Denies chest pain or edema   GI: Denies nausea, vomiting, bloody stools or diarrhea; +abdominal pain,   : Denies dysuria   Musculoskeletal: Denies back pain or joint pain   Integument: Denies rash   Neurologic: Denies headache, focal weakness or sensory changes   Endocrine: Denies polyuria or polydipsia   Lymphatic: Denies swollen glands   Psychiatric: Denies depression or anxiety          Physical Exam:     Constitutional: Awake, alert, no acute distress.  Eyes:  No scleral icterus.  Conjunctiva are without injection.  ENMT: Mucous membranes moist, dentition and gums are intact.   Neck: Soft, supple, trachea midline.    Endocrine: The thyroid is without masses and mobile with swallow.   Lymphatic: There is no cervical, submandibular adenopathy.  Respiratory: Lungs are clear to auscultation and percussion bilaterally.   Cardiovascular: Regular rate and rhythm. No murmurs, rubs, or gallops.    Abdomen: Non-distended, non-tender, normoactive bowel sounds present, No masses, 1cm reducible umbilical hernia with likely preperitoneal fat.  No hepatosplenomegaly.   Musculoskeletal: No spinal or CVA tenderness. Full range of motion in the upper and lower extremities.    Skin: No skin rashes or lesions to  inspection.  No petechia.    Neurologic: Cranial nerves II through XII are grossly intact and symmetric.  Psychiatric: The patient is alert and oriented times 3.  The patient's affect is not blunted and mood is appropriate.          Data:   Vital Signs:  /90   Wt 132.6 kg (292 lb 4.8 oz)   BMI 48.64 kg/m        WBC -   WBC   Date Value Ref Range Status   12/17/2017 9.4 4.0 - 11.0 10e9/L Final   ], HgB -   Hemoglobin   Date Value Ref Range Status   12/17/2017 15.6 13.3 - 17.7 g/dL Final   ]   Liver Function Studies -   Recent Labs   Lab Test 12/12/18  0936   PROTTOTAL 7.1   ALBUMIN 3.2*   BILITOTAL 0.5   ALKPHOS 125   AST 22   ALT 37     No results found for this or any previous visit (from the past 744 hour(s)).     Jahaira Longoria DO 5/7/2019 9:39 AM           Again, thank you for allowing me to participate in the care of your patient.        Sincerely,        Jahaira Longoria MD

## 2019-05-07 NOTE — PROGRESS NOTES
Clinic Care Coordination Contact  Care Team Conversations    Patient called Care Coordinator RN letting her know that his surgeon appointment today did not go well. He states the provider was very condescending. 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 Care Coordinator RN found her contact information [phone]. He verbalized he is making a compliant on both providers.    Yamilex CHAVEZ RN, PHN, [facility]  Primary Care Clinic RN Care Coordinator  [facility]   [email or web address]  Office:  [phone]

## 2019-05-09 ENCOUNTER — HOSPITAL ENCOUNTER (OUTPATIENT)
Dept: PHYSICAL THERAPY | Facility: CLINIC | Age: 39
Setting detail: THERAPIES SERIES
End: 2019-05-09
Attending: ORTHOPAEDIC SURGERY
Payer: COMMERCIAL

## 2019-05-09 PROCEDURE — 97110 THERAPEUTIC EXERCISES: CPT | Mod: GP

## 2019-05-13 ENCOUNTER — HOSPITAL ENCOUNTER (OUTPATIENT)
Dept: PHYSICAL THERAPY | Facility: CLINIC | Age: 39
Setting detail: THERAPIES SERIES
End: 2019-05-13
Attending: ORTHOPAEDIC SURGERY
Payer: COMMERCIAL

## 2019-05-13 PROCEDURE — 97110 THERAPEUTIC EXERCISES: CPT | Mod: GP

## 2019-05-13 PROCEDURE — 97140 MANUAL THERAPY 1/> REGIONS: CPT | Mod: GP

## 2019-05-14 ENCOUNTER — OFFICE VISIT (OUTPATIENT)
Dept: ORTHOPEDICS | Facility: CLINIC | Age: 39
End: 2019-05-14
Payer: COMMERCIAL

## 2019-05-14 VITALS — WEIGHT: 292 LBS | BODY MASS INDEX: 48.59 KG/M2

## 2019-05-14 DIAGNOSIS — M22.2X1 PATELLOFEMORAL PAIN SYNDROME OF RIGHT KNEE: Primary | ICD-10-CM

## 2019-05-14 PROCEDURE — 20610 DRAIN/INJ JOINT/BURSA W/O US: CPT | Mod: RT | Performed by: ORTHOPAEDIC SURGERY

## 2019-05-14 RX ORDER — BETAMETHASONE SODIUM PHOSPHATE AND BETAMETHASONE ACETATE 3; 3 MG/ML; MG/ML
12 INJECTION, SUSPENSION INTRA-ARTICULAR; INTRALESIONAL; INTRAMUSCULAR; SOFT TISSUE ONCE
Status: DISPENSED | OUTPATIENT
Start: 2019-05-14

## 2019-05-14 ASSESSMENT — PAIN SCALES - GENERAL: PAINLEVEL: SEVERE PAIN (7)

## 2019-05-14 NOTE — PROGRESS NOTES
INJECTION PROCEDURE:  The patient was counseled about an  injection, including discussion of risks (including infection), contents of the injection, rationale for performing the injection, and expected benefits of the injection. The skin was prepped with alcohol and betadine and then utilizing sterile technique an injection of the right knee joint from the anterolateral approach in the seated position was performed. I used dionne chloride spray prior to doing the injection. The injection consisted 2 mL of Celestone with 5 ml to % lidocaine plain. The patient tolerated the injection well, and there were no complications. The injection site was covered with a Band-Aid. The injection was performed by Ruddy Tapia PA-C    Prior to injection, verified patient identity using patient's name and date of birth.  Due to injection administration, patient instructed to remain in clinic for 15 minutes  afterwards, and to report any adverse reaction to me immediately.    Joint injection was performed.      Drug Amount Wasted:  None.  Vial/Syringe: Multi dose vial  Expiration Date:  8/2020      Our previous plan Waqas returns today primarily to have his right knee injected.    He has been attending physical therapy.  He has noticed some improvement with the therapy as well as some improvement with the taping technique.    Therefore the above injection was accomplished.    He will need to follow-up with 1 of my associates for definitive recommendations in the future.    Watson Duckworth MD

## 2019-05-14 NOTE — LETTER
5/14/2019         RE: Waqas Allen  [address] [facility] [address]        Dear Colleague,    Thank you for referring your patient, Waqas Allen, to the [facility]. Please see a copy of my visit note below.                                          INJECTION PROCEDURE:  The patient was counseled about an  injection, including discussion of risks (including infection), contents of the injection, rationale for performing the injection, and expected benefits of the injection. The skin was prepped with alcohol and betadine and then utilizing sterile technique an injection of the right knee joint from the anterolateral approach in the seated position was performed. I used dionne chloride spray prior to doing the injection. The injection consisted 2 mL of Celestone with 5 ml to % lidocaine plain. The patient tolerated the injection well, and there were no complications. The injection site was covered with a Band-Aid. The injection was performed by Ruddy Tapia PA-C    Prior to injection, verified patient identity using patient's name and date of birth.  Due to injection administration, patient instructed to remain in clinic for 15 minutes  afterwards, and to report any adverse reaction to me immediately.    Joint injection was performed.      Drug Amount Wasted:  None.  Vial/Syringe: Multi dose vial  Expiration Date:  8/2020      Our previous plan Waqas returns today primarily to have his right knee injected.    He has been attending physical therapy.  He has noticed some improvement with the therapy as well as some improvement with the taping technique.    Therefore the above injection was accomplished.    He will need to follow-up with 1 of my associates for definitive recommendations in the future.    Watson Duckworth MD    Again, thank you for allowing me to participate in the care of your patient.        Sincerely,        Watson Duckworth MD

## 2019-05-25 ENCOUNTER — MYC MEDICAL ADVICE (OUTPATIENT)
Dept: FAMILY MEDICINE | Facility: OTHER | Age: 39
End: 2019-05-25

## 2019-05-25 DIAGNOSIS — S39.012A BACK STRAIN, INITIAL ENCOUNTER: ICD-10-CM

## 2019-05-28 RX ORDER — METHOCARBAMOL 750 MG/1
750 TABLET, FILM COATED ORAL 2 TIMES DAILY PRN
Qty: 60 TABLET | Refills: 0 | Status: SHIPPED | OUTPATIENT
Start: 2019-05-28 | End: 2019-07-10

## 2019-07-04 ENCOUNTER — HOSPITAL ENCOUNTER (EMERGENCY)
Facility: CLINIC | Age: 39
Discharge: HOME | End: 2019-07-05
Attending: FAMILY MEDICINE | Admitting: FAMILY MEDICINE
Payer: COMMERCIAL

## 2019-07-04 DIAGNOSIS — M79.675 PAIN OF TOE OF LEFT FOOT: ICD-10-CM

## 2019-07-04 PROCEDURE — 99284 EMERGENCY DEPT VISIT MOD MDM: CPT | Mod: Z6 | Performed by: FAMILY MEDICINE

## 2019-07-04 PROCEDURE — 99284 EMERGENCY DEPT VISIT MOD MDM: CPT

## 2019-07-04 NOTE — ED AVS SNAPSHOT
[facility] Emergency Department  [address] [place] [address]  Phone:  [phone]  Fax:  [phone]                                    Waqas Allen   MRN: [id number]    Department:  [facility] Emergency Department   Date of Visit:  7/4/2019           After Visit Summary Signature Page    I have received my discharge instructions, and my questions have been answered. I have discussed any challenges I see with this plan with the nurse or doctor.    ..........................................................................................................................................  Patient/Patient Representative Signature      ..........................................................................................................................................  Patient Representative Print Name and Relationship to Patient    ..................................................               ................................................  Date                                   Time    ..........................................................................................................................................  Reviewed by Signature/Title    ...................................................              ..............................................  Date                                               Time          22EPIC Rev 08/18

## 2019-07-05 ENCOUNTER — APPOINTMENT (OUTPATIENT)
Dept: GENERAL RADIOLOGY | Facility: CLINIC | Age: 39
End: 2019-07-05
Attending: FAMILY MEDICINE
Payer: COMMERCIAL

## 2019-07-05 ENCOUNTER — PATIENT OUTREACH (OUTPATIENT)
Dept: CARE COORDINATION | Facility: CLINIC | Age: 39
End: 2019-07-05

## 2019-07-05 VITALS
HEART RATE: 72 BPM | DIASTOLIC BLOOD PRESSURE: 100 MMHG | OXYGEN SATURATION: 99 % | SYSTOLIC BLOOD PRESSURE: 140 MMHG | RESPIRATION RATE: 16 BRPM

## 2019-07-05 LAB
ANION GAP SERPL CALCULATED.3IONS-SCNC: 5 MMOL/L (ref 3–14)
BUN SERPL-MCNC: 18 MG/DL (ref 7–30)
CALCIUM SERPL-MCNC: 8.4 MG/DL (ref 8.5–10.1)
CHLORIDE SERPL-SCNC: 102 MMOL/L (ref 94–109)
CO2 SERPL-SCNC: 28 MMOL/L (ref 20–32)
CREAT SERPL-MCNC: 0.98 MG/DL (ref 0.66–1.25)
GFR SERPL CREATININE-BSD FRML MDRD: >90 ML/MIN/{1.73_M2}
GLUCOSE SERPL-MCNC: 386 MG/DL (ref 70–99)
POTASSIUM SERPL-SCNC: 4 MMOL/L (ref 3.4–5.3)
SODIUM SERPL-SCNC: 135 MMOL/L (ref 133–144)
URATE SERPL-MCNC: 6 MG/DL (ref 3.5–7.2)

## 2019-07-05 PROCEDURE — 84550 ASSAY OF BLOOD/URIC ACID: CPT | Performed by: FAMILY MEDICINE

## 2019-07-05 PROCEDURE — 73660 X-RAY EXAM OF TOE(S): CPT | Mod: TC,LT

## 2019-07-05 PROCEDURE — 25000131 ZZH RX MED GY IP 250 OP 636 PS 637: Performed by: FAMILY MEDICINE

## 2019-07-05 PROCEDURE — 80048 BASIC METABOLIC PNL TOTAL CA: CPT | Performed by: FAMILY MEDICINE

## 2019-07-05 PROCEDURE — 25000132 ZZH RX MED GY IP 250 OP 250 PS 637: Performed by: FAMILY MEDICINE

## 2019-07-05 RX ORDER — METHYLPREDNISOLONE 4 MG
4 TABLET, DOSE PACK ORAL SEE ADMIN INSTRUCTIONS
Qty: 21 TABLET | Refills: 0 | Status: SHIPPED | OUTPATIENT
Start: 2019-07-05 | End: 2019-07-10

## 2019-07-05 RX ORDER — ACETAMINOPHEN 500 MG
1000 TABLET ORAL ONCE
Status: COMPLETED | OUTPATIENT
Start: 2019-07-05 | End: 2019-07-05

## 2019-07-05 RX ORDER — OXYCODONE HYDROCHLORIDE 5 MG/1
5-10 TABLET ORAL EVERY 6 HOURS PRN
Qty: 8 TABLET | Refills: 0 | Status: SHIPPED | OUTPATIENT
Start: 2019-07-05 | End: 2019-07-10

## 2019-07-05 RX ORDER — PREDNISONE 20 MG/1
20 TABLET ORAL ONCE
Status: COMPLETED | OUTPATIENT
Start: 2019-07-05 | End: 2019-07-05

## 2019-07-05 RX ADMIN — ACETAMINOPHEN 1000 MG: 500 TABLET ORAL at 01:00

## 2019-07-05 RX ADMIN — PREDNISONE 20 MG: 20 TABLET ORAL at 01:00

## 2019-07-05 ASSESSMENT — ACTIVITIES OF DAILY LIVING (ADL): DEPENDENT_IADLS:: INDEPENDENT

## 2019-07-05 NOTE — PROGRESS NOTES
Clinic Care Coordination Contact  [facility]/Voicemail       Clinical Data: Care Coordinator Outreach  Outreach attempted x 1.  Left message on voicemail with call back information and requested return call.  Plan: Care Coordinator mailed out care coordination introduction letter on 5/3/19. Care Coordinator will try to reach patient again in 3-5 business days.    EVELYN Andrew RN Clinic Care Coordinator   [place], [place], [place]  Phone: [phone]

## 2019-07-05 NOTE — ED TRIAGE NOTES
Pt presents with concerns of left foot pain.  Pain started yesterday and is located on the side of the big toe.  Pt states that it hurts to move and walk on.  Pt has not taken anything for pain prior to arrival.

## 2019-07-05 NOTE — ED PROVIDER NOTES
ED Provider Note     Waqas Allen  [id number]  July 5, 2019      CC:     Chief Complaint   Patient presents with     Foot Pain          History is obtained from the patient.    HPI: Waqas Allen is a 39 year old male presenting with acute onset of left big toe pain that started yesterday morning.  Patient does not recall of any preceding injury or trauma.  Patient states that it has progressed to the point where he is not able to ambulate comfortably.  Pain levels rated 9/10.  He has a history of diabetes, hypertension, migraines, chronic back pain, and history of peptic ulcer disease.  He is not able to take anti-inflammatory medications.  He took a muscle relaxer yesterday, but nothing today.  He has no prior history of gout.  He does not recall of any injury or trauma.  There has been no significant redness or swelling.    PMH/Problem List:   Past Medical History:   Diagnosis Date     Arthritis      Diabetes (H) 2013     Unspecified essential hypertension      Variants of migraine, not elsewhere classified, without mention of intractable migraine without mention of status migrainosus      Varicella without mention of complication 1993       PSH:   Past Surgical History:   Procedure Laterality Date     ARTHROSCOPY SHOULDER DECOMPRESSION Left 1/20/2016    Procedure: ARTHROSCOPY SHOULDER DECOMPRESSION;  Surgeon: Watson Duckworth MD;  [facility]     ARTHROSCOPY SHOULDER DECOMPRESSION Right 2/1/2017    Procedure: ARTHROSCOPY SHOULDER DECOMPRESSION;  Surgeon: Watson Duckworth MD;  [facility]     ARTHROSCOPY SHOULDER DISTAL CLAVICLE REPAIR Left 1/20/2016    Procedure: ARTHROSCOPY SHOULDER DISTAL CLAVICLE RESECTION;  Surgeon: Watson Duckworth MD;  [facility]     ARTHROSCOPY SHOULDER DISTAL CLAVICLE REPAIR Right 2/1/2017    Procedure: ARTHROSCOPY SHOULDER DISTAL CLAVICLE RESECTION;  Surgeon: Watson Duckworth MD;  Location:  [facility]     LAMINECT/DISCECTOMY, LUMBAR  04/24/2007    Left L3-L4 Hemilaminectomy/Microdiskectomy.  [facility]       MEDS:     Current Facility-Administered Medications on File Prior to Encounter:  betamethasone acet & sod phos (CELESTONE) injection 12 mg     Current Outpatient Medications on File Prior to Encounter:  amoxicillin-clavulanate (AUGMENTIN) 500-125 MG tablet Take 1 tablet by mouth 2 times daily   ASPIRIN 81 MG OR TABS ONE DAILY   blood glucose monitoring (NO BRAND SPECIFIED) test strip Use to test blood sugars 1 times daily or as directed   glipiZIDE (GLUCOTROL) 10 MG tablet Take 2 tablets am, and 1 tablet pm   losartan (COZAAR) 100 MG tablet TAKE 1 TABLET BY MOUTH EVERY DAY   MELATONIN PO    methocarbamol (ROBAXIN) 750 MG tablet Take 1 tablet (750 mg) by mouth 2 times daily as needed for muscle spasms   MICROLET LANCETS MISC Use to test blood sugars 1 time daily or as directed   minocycline (MINOCIN/DYNACIN) 100 MG capsule TAKE 1 CAPSULE BY MOUTH TWICE A DAY.   order for DME Equipment being ordered: glucose meter, Dexcom G5   pioglitazone (ACTOS) 30 MG tablet TAKE 1 TABLET BY MOUTH ONCE DAILY   POTASSIUM GLUCONATE PO Take 1 tablet by mouth daily   sildenafil (VIAGRA) 50 MG tablet Take 1 tablet (50 mg) by mouth daily as needed 30 min to 4 hrs before sex. Do not use with nitroglycerin, terazosin or doxazosin.   sulfamethoxazole-trimethoprim (BACTRIM DS) 800-160 MG tablet Take 1 tablet by mouth 2 times daily   zinc 50 MG TABS Take 1 tablet by mouth daily       Allergies: Anaprox [naproxen sodium]; Darvocet [propoxyphene n-apap]; Hctz [hydrochlorothiazide]; Indocin [indomethacin]; Metformin; and Tramadol    Triage and nursing notes were reviewed.    ROS: All other systems were reviewed and are negative    Physical Exam:  Vitals:    07/04/19 2356 07/05/19 0134   BP: (!) 147/110 (!) 140/100   Pulse:  72   Resp: 19 16   SpO2: 97% 99%     GENERAL APPEARANCE: Alert, no respiratory difficulty  HEAD:  atraumatic  EYES: PER  HENT: Normal external exam  RESP: Normal respiratory effort  ABDOMEN: Obese  EXT: Tenderness over the left first MTP joint; slight increased warmth and exquisite tenderness  SKIN: no rash; no significant redness or discoloration; very minimal increase in warmth to the touch over the left big toe  NEURO: mentation and speech normal; no focal deficits    Labs/Imaging Results:  Results for orders placed or performed during the hospital encounter of 07/04/19 (from the past 24 hour(s))   XR Toe Left G/E 2 Views    Narrative    XR TOE LT G/E 2 VW  7/5/2019 12:27 AM     INDICATION: Pain left first MTP; no trauma.    COMPARISON: None.      Impression    IMPRESSION: No acute fracture or aggressive bone lesion. Slight  degenerative changes first MTP joint.    VIC OSBORNE MD   Uric acid   Result Value Ref Range    Uric Acid 6.0 3.5 - 7.2 mg/dL   Basic metabolic panel   Result Value Ref Range    Sodium 135 133 - 144 mmol/L    Potassium 4.0 3.4 - 5.3 mmol/L    Chloride 102 94 - 109 mmol/L    Carbon Dioxide 28 20 - 32 mmol/L    Anion Gap 5 3 - 14 mmol/L    Glucose 386 (H) 70 - 99 mg/dL    Urea Nitrogen 18 7 - 30 mg/dL    Creatinine 0.98 0.66 - 1.25 mg/dL    GFR Estimate >90 >60 mL/min/[1.73_m2]    GFR Estimate If Black >90 >60 mL/min/[1.73_m2]    Calcium 8.4 (L) 8.5 - 10.1 mg/dL             Impression:  Final diagnoses:   Pain of toe of left foot r/o gout         ED Course & Medical Decision Making (Plan):  Waqas Allen is a 39 year old male with acute onset of left big toe pain involving the first MTP joint.  There is no reported trauma.  He has never had this before.  The exam was only significant for some mild increased warmth and slight redness noted more toward the end of the visit.  I suspected that he might have gout, but this could not be confirmed as his uric acid level was 6.0.  His basic metabolic panel reveals a glucose of 386, with a BUN of 18 and creatinine of 0.98.  Patient has  suspicion for acute gout.  He received a dose of prednisone, and will need to closely monitor his blood sugars while taking the prednisone.  He is unable to tolerate NSAIDs, and will take Tylenol instead.  Reserve oxycodone for moderate to severe pain.  Follow-up in the clinic in 3-5 days if not improving.    Written after-visit summary and instructions were given at the time of discharge.          Follow Up Plan:  Chance Trent,   [address] [facility] [address]  [phone]    In 4 days  if not improving    [facility] Emergency Department  [address]   [facility] [address] [phone]    If symptoms worsen        Discharge Meds: Prednisone          Disclaimer: This note consists of words and symbols derived from keyboarding and dictation using voice recognition software.  As a result, there may be errors that have gone undetected.  Please consider this when interpreting information found in this note.       Kenn Conti MD  07/05/19 0152

## 2019-07-05 NOTE — DISCHARGE INSTRUCTIONS
We did not find a worrisome cause for your left big toe pain.  This may be due to gout although your uric acid level was normal today.  Take prednisone as directed.  Take Tylenol 1000 mg every 6 hours as needed pain.  Reserve oxycodone for severe pain for the next 2 days.

## 2019-07-08 NOTE — PROGRESS NOTES
Clinic Care Coordination Contact  [facility]/Voicemail    Referral Source: ED Follow-Up  Clinical Data: Care Coordinator Outreach  Outreach attempted x 2.  Left message on voicemail with call back information and requested return call.  Plan:  Care Coordinator will do no further outreaches at this time.    MARCIO AndrewN RN Clinic Care Coordinator   [place] [place]  Phone: [phone]

## 2019-07-09 ENCOUNTER — MYC MEDICAL ADVICE (OUTPATIENT)
Dept: FAMILY MEDICINE | Facility: OTHER | Age: 39
End: 2019-07-09

## 2019-07-09 ENCOUNTER — HOSPITAL ENCOUNTER (EMERGENCY)
Facility: CLINIC | Age: 39
Discharge: HOME | End: 2019-07-09
Attending: EMERGENCY MEDICINE | Admitting: EMERGENCY MEDICINE
Payer: COMMERCIAL

## 2019-07-09 VITALS
BODY MASS INDEX: 49.48 KG/M2 | RESPIRATION RATE: 20 BRPM | HEIGHT: 65 IN | HEART RATE: 94 BPM | WEIGHT: 297 LBS | SYSTOLIC BLOOD PRESSURE: 198 MMHG | TEMPERATURE: 98.6 F | OXYGEN SATURATION: 97 % | DIASTOLIC BLOOD PRESSURE: 116 MMHG

## 2019-07-09 DIAGNOSIS — M79.675 PAIN OF TOE OF LEFT FOOT: ICD-10-CM

## 2019-07-09 PROCEDURE — 99282 EMERGENCY DEPT VISIT SF MDM: CPT | Mod: Z6 | Performed by: EMERGENCY MEDICINE

## 2019-07-09 PROCEDURE — 99282 EMERGENCY DEPT VISIT SF MDM: CPT | Performed by: EMERGENCY MEDICINE

## 2019-07-09 ASSESSMENT — MIFFLIN-ST. JEOR: SCORE: 2194.06

## 2019-07-09 NOTE — ED NOTES
"Patient was very upset at discharge because he wasn't given antibiotics. \"I've never had a fever when I have cellulitis\". Area of concern outlined with skin marker by this RN and patient will return if redness spreads. He states he will also keep his appointment in the clinic tomorrow. His BP is very high at discharge and will have rechecked tomorrow, \"it's high because I'm so upset\".   "

## 2019-07-09 NOTE — ED AVS SNAPSHOT
[facility] Emergency Department  [address] [place] [address]  Phone:  [phone]  Fax:  [phone]                                    Waqas Allen   MRN: [id number]    Department:  [facility] Emergency Department   Date of Visit:  7/9/2019           After Visit Summary Signature Page    I have received my discharge instructions, and my questions have been answered. I have discussed any challenges I see with this plan with the nurse or doctor.    ..........................................................................................................................................  Patient/Patient Representative Signature      ..........................................................................................................................................  Patient Representative Print Name and Relationship to Patient    ..................................................               ................................................  Date                                   Time    ..........................................................................................................................................  Reviewed by Signature/Title    ...................................................              ..............................................  Date                                               Time          22EPIC Rev 08/18

## 2019-07-09 NOTE — ED PROVIDER NOTES
"  History     Chief Complaint   Patient presents with     Toe Pain     The history is provided by the patient.     Waqas Allen is a 39 year old male who presents to the emergency department for toe pain. Patient reports his left great toe has been painful since 7/3/19. He states it worsened throughout the day and then on 7/4/19 it was so painful he couldn't walk so he was seen in ED. He was prescribed medications for gout. He states his symptoms are getting worse since then. He states the redness has spread, it is more painful and warm to the touch. He states \"I can barely walk around, all I can do is lay down\". He denies any fever, vomiting or history of gout. He states \"nothing has helped it\". Patient reports having cellulitis in that same area about 5-6 years ago and this is acting the same way. He denies any trauma. He does have an appointment tomorrow for a follow up.    Allergies:  Allergies   Allergen Reactions     Anaprox [Naproxen Sodium]      Nausea/vomiting     Darvocet [Propoxyphene N-Apap] Other (See Comments)     \"felt like I had a hangover\".  Patient tolerates Tylenol/Acetaminophen     Hctz [Hydrochlorothiazide] Other (See Comments)     impotence     Indocin [Indomethacin] GI Disturbance     Metformin GI Disturbance     Tramadol GI Disturbance       Problem List:    Patient Active Problem List    Diagnosis Date Noted     Morbid obesity (H) 02/05/2018     Priority: Medium     AC joint arthropathy 01/06/2017     Priority: Medium     Type 2 diabetes mellitus with other specified complication, without long-term current use of insulin (H) 12/15/2015     Priority: Medium     Sprain of acromioclavicular ligament 11/25/2014     Priority: Medium     Problem list name updated by automated process. Provider to review       Hypertension goal BP (blood pressure) < 140/90 10/02/2011     Priority: Medium     Acne 06/09/2010     Priority: Medium        Past Medical History:    Past Medical History:   Diagnosis " Date     Arthritis      Diabetes (H) 2013     Unspecified essential hypertension      Variants of migraine, not elsewhere classified, without mention of intractable migraine without mention of status migrainosus      Varicella without mention of complication 1993       Past Surgical History:    Past Surgical History:   Procedure Laterality Date     ARTHROSCOPY SHOULDER DECOMPRESSION Left 1/20/2016    Procedure: ARTHROSCOPY SHOULDER DECOMPRESSION;  Surgeon: Watson Duckworth MD;  [facility]     ARTHROSCOPY SHOULDER DECOMPRESSION Right 2/1/2017    Procedure: ARTHROSCOPY SHOULDER DECOMPRESSION;  Surgeon: Watson Duckworth MD;  [facility]     ARTHROSCOPY SHOULDER DISTAL CLAVICLE REPAIR Left 1/20/2016    Procedure: ARTHROSCOPY SHOULDER DISTAL CLAVICLE RESECTION;  Surgeon: Watson Duckworth MD;  [facility]     ARTHROSCOPY SHOULDER DISTAL CLAVICLE REPAIR Right 2/1/2017    Procedure: ARTHROSCOPY SHOULDER DISTAL CLAVICLE RESECTION;  Surgeon: Watson Duckworth MD;  [facility]     LAMINECT/DISCECTOMY, LUMBAR  04/24/2007    Left L3-L4 Hemilaminectomy/Microdiskectomy.  [facility]       Family History:    Family History   Problem Relation Age of Onset     Hypertension Brother      Cerebrovascular Disease Maternal Grandfather      Unknown/Adopted Father      Hypertension Father      Hypertension Brother        Social History:  Marital Status:  Single [1]  Social History     Tobacco Use     Smoking status: Never Smoker     Smokeless tobacco: Never Used   Substance Use Topics     Alcohol use: Yes     Alcohol/week: 0.6 - 1.2 oz     Comment: 1 drink a day     Drug use: No        Medications:      amoxicillin-clavulanate (AUGMENTIN) 500-125 MG tablet   ASPIRIN 81 MG OR TABS   blood glucose monitoring (NO BRAND SPECIFIED) test strip   glipiZIDE (GLUCOTROL) 10 MG tablet   losartan (COZAAR) 100 MG tablet   MELATONIN PO   methocarbamol (ROBAXIN) 750 MG tablet   methylPREDNISolone (MEDROL DOSEPAK) 4 MG  "tablet therapy pack   MICROLET LANCETS MISC   minocycline (MINOCIN/DYNACIN) 100 MG capsule   order for DME   oxyCODONE (ROXICODONE) 5 MG tablet   pioglitazone (ACTOS) 30 MG tablet   POTASSIUM GLUCONATE PO   sildenafil (VIAGRA) 50 MG tablet   sulfamethoxazole-trimethoprim (BACTRIM DS) 800-160 MG tablet   zinc 50 MG TABS         Review of Systems   All other systems reviewed and are negative.      Physical Exam   BP: (!) 161/102  Pulse: 92  Temp: 98.6  F (37  C)  Resp: 20  Height: 165.1 cm (5' 5\")  Weight: 134.7 kg (297 lb)  SpO2: 97 %      Physical Exam   Constitutional: He is oriented to person, place, and time. He appears well-developed and well-nourished. No distress.   HENT:   Head: Normocephalic and atraumatic.   Eyes: Pupils are equal, round, and reactive to light. No scleral icterus.   Neck: Normal range of motion. Neck supple.   Cardiovascular: Normal rate and regular rhythm.   Pulmonary/Chest: Effort normal and breath sounds normal.   Abdominal: Soft.   Musculoskeletal:   Left foot region of the 1st MP joint, swelling and erythema. Severe pain with ROM of the MP joint.  Distal CMS intact to the foot.       Neurological: He is alert and oriented to person, place, and time.   Skin: Skin is warm and dry. No rash noted. He is not diaphoretic.   Psychiatric: He has a normal mood and affect. His behavior is normal. Thought content normal.   Nursing note and vitals reviewed.      ED Course        Procedures               Critical Care time:  none               No results found for this or any previous visit (from the past 24 hour(s)).    Medications - No data to display    Assessments & Plan (with Medical Decision Making)  39-year-old male who is been having pain in his left first MP joint.  He was seen here by Dr. Conti and placed on steroids as it was felt to be possibly gout.  At that point uric acid was not helpful as it was normal.  There is been no trauma.  X-rays were normal.  He has had no fever.  There is " exquisite pain with movement of the MP joint that appears to be a possible arthritic type process, most suspicious for gout.  I have recommended we get him in with podiatry for a more definitive diagnosis.  Patient feels he has cellulitis.  I tried to explain to him that this appears to be more arthritic.  He has no fever and the exquisite pain with movement of the joint.  He would most benefit from a consultation from podiatry for an expert opinion.  We were able to get him an appointment with podiatry with Dr. Duenas tomorrow.  Patient refused this appointment.  I offered him appointment with podiatry in [place], patient refused this.  He stated he would just go see Dr. Trent as already scheduled tomorrow.  In the meantime, have offered him a postop shoe for discomfort.     I have reviewed the nursing notes.    I have reviewed the findings, diagnosis, plan and need for follow up with the patient.          Medication List      There are no discharge medications for this visit.         Final diagnoses:   Pain of toe of left foot     This document serves as a record of services personally performed by Aroldo Brown MD. It was created on their behalf by Sury Ascencio, a trained medical scribe. The creation of this record is based on the provider's personal observations and the statements of the patient. This document has been checked and approved by the attending provider.    Note: Chart documentation done in part with Dragon Voice Recognition software. Although reviewed after completion, some word and grammatical errors may remain.    7/9/2019   [facility] EMERGENCY DEPARTMENT     Aroldo Brown MD  07/09/19 5211

## 2019-07-09 NOTE — ED TRIAGE NOTES
Pt has pain and redness to left big toe, Seen and treated for gout four days ago in ED. It is getting worse. He is diabetic, which he does not believe he is, he says.    no

## 2019-07-10 ENCOUNTER — OFFICE VISIT (OUTPATIENT)
Dept: FAMILY MEDICINE | Facility: OTHER | Age: 39
End: 2019-07-10
Payer: COMMERCIAL

## 2019-07-10 ENCOUNTER — TELEPHONE (OUTPATIENT)
Dept: FAMILY MEDICINE | Facility: OTHER | Age: 39
End: 2019-07-10

## 2019-07-10 VITALS
TEMPERATURE: 98.3 F | WEIGHT: 288 LBS | BODY MASS INDEX: 47.93 KG/M2 | OXYGEN SATURATION: 100 % | RESPIRATION RATE: 22 BRPM | DIASTOLIC BLOOD PRESSURE: 68 MMHG | SYSTOLIC BLOOD PRESSURE: 138 MMHG | HEART RATE: 100 BPM

## 2019-07-10 DIAGNOSIS — E11.69 TYPE 2 DIABETES MELLITUS WITH OTHER SPECIFIED COMPLICATION, WITHOUT LONG-TERM CURRENT USE OF INSULIN (H): Primary | ICD-10-CM

## 2019-07-10 DIAGNOSIS — M10.09 ACUTE IDIOPATHIC GOUT OF MULTIPLE SITES: Primary | ICD-10-CM

## 2019-07-10 DIAGNOSIS — S39.012A BACK STRAIN, INITIAL ENCOUNTER: ICD-10-CM

## 2019-07-10 DIAGNOSIS — I10 HYPERTENSION GOAL BP (BLOOD PRESSURE) < 140/90: ICD-10-CM

## 2019-07-10 DIAGNOSIS — M10.072 ACUTE IDIOPATHIC GOUT INVOLVING TOE OF LEFT FOOT: ICD-10-CM

## 2019-07-10 LAB
CREAT UR-MCNC: 118 MG/DL
HBA1C MFR BLD: 9.9 % (ref 0–5.6)
MICROALBUMIN UR-MCNC: 37 MG/L
MICROALBUMIN/CREAT UR: 31.19 MG/G CR (ref 0–17)
TSH SERPL DL<=0.005 MIU/L-ACNC: 2.32 MU/L (ref 0.4–4)

## 2019-07-10 PROCEDURE — 84443 ASSAY THYROID STIM HORMONE: CPT | Performed by: INTERNAL MEDICINE

## 2019-07-10 PROCEDURE — 83036 HEMOGLOBIN GLYCOSYLATED A1C: CPT | Performed by: INTERNAL MEDICINE

## 2019-07-10 PROCEDURE — 99214 OFFICE O/P EST MOD 30 MIN: CPT | Performed by: INTERNAL MEDICINE

## 2019-07-10 PROCEDURE — 82043 UR ALBUMIN QUANTITATIVE: CPT | Performed by: INTERNAL MEDICINE

## 2019-07-10 PROCEDURE — 36415 COLL VENOUS BLD VENIPUNCTURE: CPT | Performed by: INTERNAL MEDICINE

## 2019-07-10 RX ORDER — COLCHICINE 0.6 MG/1
TABLET ORAL
Qty: 9 TABLET | Refills: 0 | Status: SHIPPED | OUTPATIENT
Start: 2019-07-10 | End: 2019-08-23

## 2019-07-10 RX ORDER — METHOCARBAMOL 750 MG/1
750 TABLET, FILM COATED ORAL 2 TIMES DAILY PRN
Qty: 60 TABLET | Refills: 0 | Status: SHIPPED | OUTPATIENT
Start: 2019-07-10 | End: 2019-08-23

## 2019-07-10 RX ORDER — OXYCODONE AND ACETAMINOPHEN 5; 325 MG/1; MG/1
1 TABLET ORAL AT BEDTIME
Qty: 12 TABLET | Refills: 0 | Status: SHIPPED | OUTPATIENT
Start: 2019-07-10 | End: 2019-08-23

## 2019-07-10 ASSESSMENT — PAIN SCALES - GENERAL: PAINLEVEL: EXTREME PAIN (9)

## 2019-07-10 NOTE — TELEPHONE ENCOUNTER
Prior Authorization Retail Medication Request    Medication/Dose: colchicine (COLCYRS) 0.6 MG tablet  ICD code (if different than what is on RX):  Acute idiopathic gout involving toe of left foot [M10.072]   Previously Tried and Failed:  na  Rationale:  na    Insurance Name:  BLUE PLUS - BLUE PLUS ADVANTAGE [place] ([facility])  Insurance ID:  [id number]      Pharmacy Information (if different than what is on RX)  Name:  [facility]to[facility]  Phone:  [phone]

## 2019-07-10 NOTE — TELEPHONE ENCOUNTER
Reason for Call:  Other call back    Detailed comments: Patient stated that he needs a Prior Auth for the Colchicine medication.    Phone Number Patient can be reached at: Cell number on file:    Telephone Information:   Mobile [phone]       Best Time: any    Can we leave a detailed message on this number? YES    Call taken on 7/10/2019 at 4:38 PM by Renetta Middleton

## 2019-07-10 NOTE — PROGRESS NOTES
Subjective     Waqas Allen is a 39 year old male who presents to clinic today for the following health issues:    HPI   ED/UC Followup:  Facility:  [facility]  Date of visit: 7/4/2019 & 7/9/2019  Reason for visit: Toe pain  Current Status: no improvement                          Chief Complaint         The patient is a pleasant 39-year-old gentleman who presents today with severe pain in his left great toe.  He was recently in the emergency department where uric acid level was performed and was not excessively high but the diagnosis of gout was made.  He was started on a short course of steroid but notes no significant improvement was noted.  He notes that the pain is extremely severe and he cannot even put sheets over his foot at night.  The redness and inflammation is modest but any motion of the toe is extremely painful.  There is no signs of cellulitis infection.  He was offered an appointment with podiatry prior to this visit but refused.  He apparently does not like our podiatrist.  Alternate appointment with another podiatrist was offered and he refused that as well.  He denies any fever or chills.  He also notes ongoing low back pain which she is now had 4 for a week or so.  He attributes this to temporary back strain.  He has been using some muscle relaxers in the past and this does bring him relief.  He does have concurrent history of hypertension which is controlled diabetes with a recent A1c which was markedly elevated at 12.6.  He notes he is much more aggressive with his dietary control medications now.  Will repeat A1c is expected to be markedly improved                         PAST, FAMILY,SOCIAL HISTORY:     Medical  History:   has a past medical history of Arthritis, Diabetes (H) (2013), Unspecified essential hypertension, Variants of migraine, not elsewhere classified, without mention of intractable migraine without mention of status migrainosus, and Varicella without mention of complication  (1993).     Surgical History:   has a past surgical history that includes laminect/discectomy, lumbar (04/24/2007); Arthroscopy shoulder decompression (Left, 1/20/2016); Arthroscopy shoulder distal clavicle repair (Left, 1/20/2016); Arthroscopy shoulder distal clavicle repair (Right, 2/1/2017); and Arthroscopy shoulder decompression (Right, 2/1/2017).     Social History:   reports that he has never smoked. He has never used smokeless tobacco. He reports that he drinks about 0.6 - 1.2 oz of alcohol per week. He reports that he does not use drugs.     Family History:  family history includes Cerebrovascular Disease in his maternal grandfather; Hypertension in his brother, brother, and father; Unknown/Adopted in his father.            MEDICATIONS  Current Outpatient Medications   Medication Sig Dispense Refill     ASPIRIN 81 MG OR TABS ONE DAILY 100 3     blood glucose monitoring (NO BRAND SPECIFIED) test strip Use to test blood sugars 1 times daily or as directed 100 strip 3     colchicine (COLCYRS) 0.6 MG tablet 2 pills now and 1 pill before bed. May take 1 pill 3 times a day tomorrow and next day. STOP at resolution of pain or diarrhea.. 9 tablet 0     glipiZIDE (GLUCOTROL) 10 MG tablet Take 2 tablets am, and 1 tablet pm 270 tablet 0     losartan (COZAAR) 100 MG tablet TAKE 1 TABLET BY MOUTH EVERY DAY 90 tablet 1     MELATONIN PO        methocarbamol (ROBAXIN) 750 MG tablet Take 1 tablet (750 mg) by mouth 2 times daily as needed for muscle spasms 60 tablet 0     MICROLET LANCETS MISC Use to test blood sugars 1 time daily or as directed 100 each 3     minocycline (MINOCIN/DYNACIN) 100 MG capsule TAKE 1 CAPSULE BY MOUTH TWICE A DAY. 60 capsule 9     order for DME Equipment being ordered: glucose meter, Dexcom G5 1 Units 1     oxyCODONE-acetaminophen (PERCOCET) 5-325 MG tablet Take 1 tablet by mouth At Bedtime for 12 days 12 tablet 0     POTASSIUM GLUCONATE PO Take 1 tablet by mouth daily       sildenafil (VIAGRA) 50  MG tablet Take 1 tablet (50 mg) by mouth daily as needed 30 min to 4 hrs before sex. Do not use with nitroglycerin, terazosin or doxazosin. 12 tablet 11     zinc 50 MG TABS Take 1 tablet by mouth daily           --------------------------------------------------------------------------------------------------------------------                              Review of Systems       LUNGS: Pt denies: cough, excess sputum, hemoptysis, or shortness of breath.   HEART: Pt denies: chest pain, arrhythmia, syncope, tachy or bradyarrhythmia.   GI: Pt denies: nausea, vomiting, diarrhea, constipation, melena, or hematochezia.   NEURO: Pt denies: seizures, strokes, diplopia, weakness, paraesthesias, or paralysis.   SKIN: Pt denies: itching, rashes, discoloration, or specific lesions of concern. Denies recent hair loss.   PSYCH: The patient denies significant depression, anxiety, mood imbalance. Specifically denies any suicidal ideation.                                     Examination    /68 (BP Location: Right arm, Patient Position: Sitting, Cuff Size: Adult Large)   Pulse 100   Temp 98.3  F (36.8  C) (Temporal)   Resp 22   Wt 130.6 kg (288 lb)   SpO2 100%   BMI 47.93 kg/m       Constitutional: The patient appears to be in no acute distress. The patient appears to be adequately hydrated. No acute respiratory or hemodynamic distress is noted at this time.   LUNGS: clear bilaterally, airflow is brisk, no intercostal retraction or stridor is noted. No coughing is noted during visit.   HEART:  regular without rubs, clicks, gallops, or murmurs. PMI is nondisplaced. Upstrokes are brisk. S1,S2 are heard.   GI: Abdomen is soft, without rebound, guarding or tenderness. Bowel sounds are appropriate. No renal bruits are heard.Abdomen is obese   NEURO: Pt is alert and appropriate. No neurologic lateralization is noted. Cranial nerves 2-12 are intact. Peripheral sensory and motor function are grossly normal.    SKIN:  warm and  dry. No erythema, or rashes are noted. No specific lesions of concern are noted.    PSYCH: The patient appears grossly appropriate. Maintains good eye contact, does not have any jittery or atypical motion. Displays appropriate affect.   MS: Minimal crepitance is noted in the extremities. No deformity is present. Muscle strength is appropriate and equal bilaterally. No acute joint erythema or swelling is present.  Acute tenderness with motion of the great toe is noted.  No signs of abscess formation or significant cellulitis are present.                                             Decision Making    1. Back strain, initial encounter  Use the Robaxin sparingly.  Recommend moist heat and stretching exercises.  - methocarbamol (ROBAXIN) 750 MG tablet; Take 1 tablet (750 mg) by mouth 2 times daily as needed for muscle spasms  Dispense: 60 tablet; Refill: 0    2. Type 2 diabetes mellitus with other specified complication, without long-term current use of insulin (H)  Repeat A1c, continue current medications and improve level compliance  - Albumin Random Urine Quantitative with Creat Ratio  - Hemoglobin A1c    3. Acute idiopathic gout involving toe of left foot  Start colchicine.  Very short course of oxycodone to be used primarily at bedtime.  - colchicine (COLCYRS) 0.6 MG tablet; 2 pills now and 1 pill before bed. May take 1 pill 3 times a day tomorrow and next day. STOP at resolution of pain or diarrhea..  Dispense: 9 tablet; Refill: 0  - oxyCODONE-acetaminophen (PERCOCET) 5-325 MG tablet; Take 1 tablet by mouth At Bedtime for 12 days  Dispense: 12 tablet; Refill: 0  The [place] prescription database has been reviewed and the patient does not appear to be at excessive risk for abuse.    4. Hypertension goal BP (blood pressure) < 140/90  Controlled  - TSH WITH FREE T4 REFLEX                           FOLLOW UP   I have asked the patient to make an appointment for followup with me 1 week        I have carefully  explained the diagnosis and treatment options to the patient.  The patient has displayed an understanding of the above, and all subsequent questions were answered.      DO ROSE MARY Miller    Portions of this note were produced using [employer]  Although every attempt at real-time proof reading has been made, occasional grammar/syntax errors may have been missed.           .

## 2019-07-12 NOTE — TELEPHONE ENCOUNTER
Called the pharmacy to have them run Brand name Colcrys as generic was removed from the market several years ago and many insurance companies will not pay for it, but per technician when she tried to run the brand she gets the same rejection and they'd prefer and have the generic in stock. Confirmed patients insurance information and will initiate an urgent PA for they colchicine.     PA Initiation    Medication: colchicine (COLCYRS) 0.6 MG tablet  Insurance Company: [employer] - Phone [phone] Fax [phone]  Pharmacy Filling the Rx: [id number] - [place], [place] - [address] [place] [address]  Filling Pharmacy Phone: [phone]  Filling Pharmacy Fax:    Start Date: 7/12/2019    Central Prior Authorization Team   Phone: [phone]

## 2019-07-12 NOTE — TELEPHONE ENCOUNTER
Chris calls very mad that this PA has not gone through yet.  Chris states [facility] told him Kermit has not taken care of our end yet and that if we had he could have had this medication before this weekend.  Chris states now he is going to be in pain and crabby all weekend and continues to take his anger out on this writer.  Informed Chris I would relay this message and hung up.

## 2019-07-15 RX ORDER — COLCHICINE 0.6 MG/1
0.6 CAPSULE ORAL 2 TIMES DAILY
Qty: 10 CAPSULE | Refills: 0 | Status: SHIPPED | OUTPATIENT
Start: 2019-07-15 | End: 2019-08-23

## 2019-07-15 NOTE — TELEPHONE ENCOUNTER
PRIOR AUTHORIZATION DENIED    Medication: colchicine (COLCYRS) 0.6 MG tablet    Denial Date:  7/15/2019    Denial Rational: Patient must have a history of trial & failure to the formulary alternative(s) or have a contraindication or intolerance to the formulary alternatives:       Routing back to clinic- new script for capsules may be needed to be sent to pharmacy      Appeal Information:

## 2019-07-16 NOTE — RESULT ENCOUNTER NOTE
Dear Waqas, your recent test results are attached.  The microalbumin is minimally elevated and improved over previous.  Thyroid is well adjusted.  Hemoglobin A1c has come down from the previous 12.6 down to 9.9 which is markedly improved however further improvement in blood sugar management is necessary.  Please set up an appointment to discuss further options for blood sugar control.    Feel free to contact me via the office or My Chart if you have any questions regarding the above.  Sincerely,  Chnace Trent DO FACOI

## 2019-07-18 ENCOUNTER — MYC MEDICAL ADVICE (OUTPATIENT)
Dept: FAMILY MEDICINE | Facility: OTHER | Age: 39
End: 2019-07-18

## 2019-07-18 DIAGNOSIS — M10.079 ACUTE IDIOPATHIC GOUT OF FOOT, UNSPECIFIED LATERALITY: Primary | ICD-10-CM

## 2019-07-24 RX ORDER — PREDNISONE 20 MG/1
TABLET ORAL
Qty: 21 TABLET | Refills: 0 | Status: SHIPPED | OUTPATIENT
Start: 2019-07-24 | End: 2019-08-23

## 2019-07-24 NOTE — TELEPHONE ENCOUNTER
Patient calling in regards to this message and states the pills did not help at all and he is miserable. Please advise.

## 2019-08-08 DIAGNOSIS — I10 HYPERTENSION GOAL BP (BLOOD PRESSURE) < 140/90: ICD-10-CM

## 2019-08-08 RX ORDER — LOSARTAN POTASSIUM 100 MG/1
TABLET ORAL
Qty: 90 TABLET | Refills: 1 | Status: SHIPPED | OUTPATIENT
Start: 2019-08-08 | End: 2020-02-05

## 2019-08-08 NOTE — TELEPHONE ENCOUNTER
"Cozaar  Last Written Prescription Date:  02/06/2019  Last Fill Quantity: 90,  # refills: 1   Last office visit: 7/10/2019 with prescribing provider:  Miley   Future Office Visit:   Next 5 appointments (look out 90 days)    Aug 14, 2019  3:40 PM CDT  MyChart Short with Chance Trent,   [facility] [address] [facility] [address] [phone]      Prescription approved per G Refill Protocol.    Requested Prescriptions   Pending Prescriptions Disp Refills     losartan (COZAAR) 100 MG tablet [Pharmacy Med Name: LOSARTAN 100MG TABLET] 90 tablet 1     Sig: TAKE 1 TABLET BY MOUTH EVERY DAY       Angiotensin-II Receptors Passed - 8/8/2019  1:03 AM        Passed - Last blood pressure under 140/90 in past 12 months     BP Readings from Last 3 Encounters:   07/10/19 138/68   07/09/19 (!) 198/116   07/05/19 (!) 140/100           Passed - Recent (12 mo) or future (30 days) visit within the authorizing provider's specialty     Patient had office visit in the last 12 months or has a visit in the next 30 days with authorizing provider or within the authorizing provider's specialty.  See \"Patient Info\" tab in inbasket, or \"Choose Columns\" in Meds & Orders section of the refill encounter.          Passed - Medication is active on med list        Passed - Patient is age 18 or older        Passed - Normal serum creatinine on file in past 12 months     Recent Labs   Lab Test 07/05/19  0037   CR 0.98           Passed - Normal serum potassium on file in past 12 months     Recent Labs   Lab Test 07/05/19  0037   POTASSIUM 4.0         Yola Sanchez Rn   "

## 2019-08-13 NOTE — PROGRESS NOTES
Outpatient Physical Therapy Discharge Note     Patient: Waqas Allen  : 1980    Beginning/End Dates of Reporting Period:  2019 to 2019 (pt seen for 7 visits including eval between 2019-2019)     Referring Provider: Dr. Duckworth    Therapy Diagnosis: R knee pain with decreased strength and joint mobility     Client Self Report: Pt states that he is pretty sore today. He ntoes that he was working a lot this weekend and was up and down a ladder today.    Objective Measurements:  Objective Measure: pain  Details: R knee at a 7/10     Goals:  Goal Identifier 1   Goal Description Patient will improve LEFS score by at least 10 points in order to demonstrate improvements in functional activities.    Target Date 19   Date Met      Progress:     Goal Identifier 2   Goal Description Patient will perform 15 straight leg raises without extensor lag to demonstrate improved quad activation needed to facilitate a normal gait pattern.   Target Date 19   Date Met      Progress:     Goal Identifier 3   Goal Description Patient will perform 15 sit to stands at chair height without knee valgus, excessive anterior translation of tibia, and with symmetrical weight distribution to demonstrate improved functional hip abductor and quad strength needed to maintain proper LE alignment when coming from sit to stand.    Target Date 19   Date Met      Progress:     Goal Identifier 4   Goal Description Patient will perform 15 repetitions of side lying hip abduction without hip flexor substitution to demonstrate improved hip abductor strength needed to maintain proper LE alignment during gait and stairs.    Target Date 19   Date Met      Progress:       Progress Toward Goals:   Not assessed this period.  Pt was not seen for a final visit for completing assessment of progress. PT was a no show for final 4 scheduled PT visits. Writer left message for return call on 2019 with no return call  made. This writer will discharge patient's episode of care at this time.    Plan:  Discharge from therapy.    Discharge:    Reason for Discharge: Patient has failed to schedule further appointments.    Equipment Issued: N/A    Discharge Plan: It is assumed patient will continue home program.    Thank you for your referral,     Dhara Schwartz, PT, DPT  [phone]  [facility]

## 2019-08-13 NOTE — ADDENDUM NOTE
Encounter addended by: Dhara Schwartz, PT on: 8/13/2019 1:30 PM   Actions taken: Sign clinical note, Episode resolved

## 2019-08-22 ENCOUNTER — TELEPHONE (OUTPATIENT)
Dept: FAMILY MEDICINE | Facility: OTHER | Age: 39
End: 2019-08-22

## 2019-08-22 NOTE — TELEPHONE ENCOUNTER
"Unfortunately, the patient was unable to attend his last visit last week.  If he is not having worsening of his symptoms, we will have to try to fit him in as best we can.  Please set him up in the next appointment spot.  Feel free to use the \"extra special, super secret, emergent, same day,  only \" spots.    Miley  "

## 2019-08-22 NOTE — TELEPHONE ENCOUNTER
Patient calling back, would like a call back ASAP to see what he can do for his issue. Please call to advise

## 2019-08-22 NOTE — TELEPHONE ENCOUNTER
Reason for call:  Patient reporting a symptom    Symptom or request: Gout flair up     Duration (how long have symptoms been present): yesterday     Have you been treated for this before? Yes    Additional comments: Patient calling stating his gout is flaring up again. States he is in a lot of pain. Wondering if he needs to be seen or if some medication can be called in. Please call and advise     Phone Number patient can be reached at:  Cell number on file:    Telephone Information:   Mobile [phone]       Best Time:  Any     Can we leave a detailed message on this number:  YES    Call taken on 8/22/2019 at 8:39 AM by Christine Alexander

## 2019-08-22 NOTE — TELEPHONE ENCOUNTER
Called and LM for patient to call back. Please relay message below and help schedule appointment.     Adrienne Brisoce MA

## 2019-08-22 NOTE — TELEPHONE ENCOUNTER
Clinic Action Needed:No  Reason for Call: Patient returned call to clinic.  Writer advised patient of plans for clinic to work him in and patient was transferred to scheduling to arrange an appointment time.  Patient Recommendations/Teaching:Referred to clinic - routine visit  Teaching per [facility] Care guidelines.    Radha Aden RN  [facility]

## 2019-08-23 ENCOUNTER — OFFICE VISIT (OUTPATIENT)
Dept: FAMILY MEDICINE | Facility: OTHER | Age: 39
End: 2019-08-23
Payer: COMMERCIAL

## 2019-08-23 VITALS
SYSTOLIC BLOOD PRESSURE: 148 MMHG | RESPIRATION RATE: 24 BRPM | OXYGEN SATURATION: 100 % | DIASTOLIC BLOOD PRESSURE: 92 MMHG | TEMPERATURE: 98.5 F | HEART RATE: 86 BPM

## 2019-08-23 DIAGNOSIS — S39.012A BACK STRAIN, INITIAL ENCOUNTER: ICD-10-CM

## 2019-08-23 DIAGNOSIS — E66.01 MORBID OBESITY (H): ICD-10-CM

## 2019-08-23 DIAGNOSIS — M10.072 ACUTE IDIOPATHIC GOUT INVOLVING TOE OF LEFT FOOT: ICD-10-CM

## 2019-08-23 DIAGNOSIS — E11.69 TYPE 2 DIABETES MELLITUS WITH OTHER SPECIFIED COMPLICATION, WITHOUT LONG-TERM CURRENT USE OF INSULIN (H): Primary | ICD-10-CM

## 2019-08-23 PROCEDURE — 99214 OFFICE O/P EST MOD 30 MIN: CPT | Performed by: INTERNAL MEDICINE

## 2019-08-23 RX ORDER — PREDNISONE 50 MG/1
50 TABLET ORAL DAILY
Qty: 7 TABLET | Refills: 0 | Status: SHIPPED | OUTPATIENT
Start: 2019-08-23 | End: 2019-11-13

## 2019-08-23 RX ORDER — OXYCODONE AND ACETAMINOPHEN 5; 325 MG/1; MG/1
1 TABLET ORAL AT BEDTIME
Qty: 20 TABLET | Refills: 0 | Status: SHIPPED | OUTPATIENT
Start: 2019-08-23 | End: 2019-11-13

## 2019-08-23 RX ORDER — METHOCARBAMOL 750 MG/1
750 TABLET, FILM COATED ORAL 2 TIMES DAILY PRN
Qty: 60 TABLET | Refills: 0 | Status: SHIPPED | OUTPATIENT
Start: 2019-08-23 | End: 2019-10-13

## 2019-08-23 ASSESSMENT — PAIN SCALES - GENERAL: PAINLEVEL: WORST PAIN (10)

## 2019-08-23 NOTE — PROGRESS NOTES
Subjective     Waqas Allen is a 39 year old male who presents to clinic today for the following health issues:    HPI   Chief Complaint   Patient presents with     Arthritis     recheck gout, was better for 3-4 days now its much worse, Cannot walk                     Chief Complaint         The patient is a pleasant 39-year-old gentleman who has a history of gout.  He was previously treated with colchicine and had improvement.  Unfortunately has now recurred.  It is primarily in his left great toe at the metatarsophalangeal joint.  It is red, hot, and swollen.  It is extremely uncomfortable.  He cannot walk without significant pain.  He does have a history of type 2 diabetes and is on oral medication.  He notes that his blood sugars have been fairly well controlled.  His most recent glycosylated hemoglobin was 9.9.  This does suggest an adequate control.  We discussed the need for dietary restriction and weight loss in detail.  Additionally, he continues to have some low back pain.  No radicular pain is noted.  No radiation of the legs is seen.  He is taking Robaxin for this and gets pretty decent relief.                       PAST, FAMILY,SOCIAL HISTORY:     Medical  History:   has a past medical history of Arthritis, Diabetes (H) (2013), Unspecified essential hypertension, Variants of migraine, not elsewhere classified, without mention of intractable migraine without mention of status migrainosus, and Varicella without mention of complication (1993).     Surgical History:   has a past surgical history that includes laminect/discectomy, lumbar (04/24/2007); Arthroscopy shoulder decompression (Left, 1/20/2016); Arthroscopy shoulder distal clavicle repair (Left, 1/20/2016); Arthroscopy shoulder distal clavicle repair (Right, 2/1/2017); and Arthroscopy shoulder decompression (Right, 2/1/2017).     Social History:   reports that he has never smoked. He has never used smokeless tobacco. He reports that he drinks  about 0.6 - 1.2 oz of alcohol per week. He reports that he does not use drugs.     Family History:  family history includes Cerebrovascular Disease in his maternal grandfather; Hypertension in his brother, brother, and father; Unknown/Adopted in his father.            MEDICATIONS  Current Outpatient Medications   Medication Sig Dispense Refill     ASPIRIN 81 MG OR TABS ONE DAILY 100 3     blood glucose monitoring (NO BRAND SPECIFIED) test strip Use to test blood sugars 1 times daily or as directed 100 strip 3     glipiZIDE (GLUCOTROL) 10 MG tablet Take 2 tablets am, and 1 tablet pm 270 tablet 0     losartan (COZAAR) 100 MG tablet TAKE 1 TABLET BY MOUTH EVERY DAY 90 tablet 1     MELATONIN PO        methocarbamol (ROBAXIN) 750 MG tablet Take 1 tablet (750 mg) by mouth 2 times daily as needed for muscle spasms 60 tablet 0     MICROLET LANCETS MISC Use to test blood sugars 1 time daily or as directed 100 each 3     minocycline (MINOCIN/DYNACIN) 100 MG capsule TAKE 1 CAPSULE BY MOUTH TWICE A DAY. 60 capsule 9     order for DME Equipment being ordered: glucose meter, Dexcom G5 1 Units 1     oxyCODONE-acetaminophen (PERCOCET) 5-325 MG tablet Take 1 tablet by mouth At Bedtime 20 tablet 0     POTASSIUM GLUCONATE PO Take 1 tablet by mouth daily       predniSONE (DELTASONE) 50 MG tablet Take 1 tablet (50 mg) by mouth daily 7 tablet 0     sildenafil (VIAGRA) 50 MG tablet Take 1 tablet (50 mg) by mouth daily as needed 30 min to 4 hrs before sex. Do not use with nitroglycerin, terazosin or doxazosin. 12 tablet 11     zinc 50 MG TABS Take 1 tablet by mouth daily           --------------------------------------------------------------------------------------------------------------------                              Review of Systems       LUNGS: Pt denies: cough, excess sputum, hemoptysis, or shortness of breath.   HEART: Pt denies: chest pain, arrhythmia, syncope, tachy or bradyarrhythmia.   GI: Pt denies: nausea, vomiting,  diarrhea, constipation, melena, or hematochezia.   NEURO: Pt denies: seizures, strokes, diplopia, weakness, paraesthesias, or paralysis.   SKIN: Pt denies: itching, rashes, discoloration, or specific lesions of concern. Denies recent hair loss.   PSYCH: The patient denies significant depression, anxiety, mood imbalance. Specifically denies any suicidal ideation.                                     Examination    BP (!) 148/92 (BP Location: Right arm, Patient Position: Sitting, Cuff Size: Adult Large)   Pulse 86   Temp 98.5  F (36.9  C) (Temporal)   Resp 24   SpO2 100%      Constitutional: The patient appears to be in no acute distress. The patient appears to be adequately hydrated. No acute respiratory or hemodynamic distress is noted at this time.   LUNGS: clear bilaterally, airflow is brisk, no intercostal retraction or stridor is noted. No coughing is noted during visit.   HEART:  regular without rubs, clicks, gallops, or murmurs. PMI is nondisplaced. Upstrokes are brisk. S1,S2 are heard.   GI: Abdomen is soft, without rebound, guarding or tenderness. Bowel sounds are appropriate. No renal bruits are heard.   NEURO: Pt is alert and appropriate. No neurologic lateralization is noted. Cranial nerves 2-12 are intact. Peripheral sensory and motor function are grossly normal.    SKIN:  warm and dry. No erythema, or rashes are noted. No specific lesions of concern are noted.    PSYCH: The patient appears grossly appropriate. Maintains good eye contact, does not have any jittery or atypical motion. Displays appropriate affect.   MS: Minimal crepitance is noted in the extremities. No deformity is present. Muscle strength is appropriate and equal bilaterally. No acute joint erythema or swelling is present.  The left toe is red, swollen and acutely tender as noted.                                         Decision Making  1. Acute idiopathic gout involving toe of left foot  Brief course of oxycodone, short course of  prednisone,  Discussed suppressive therapy with allopurinol  - oxyCODONE-acetaminophen (PERCOCET) 5-325 MG tablet; Take 1 tablet by mouth At Bedtime  Dispense: 20 tablet; Refill: 0  - predniSONE (DELTASONE) 50 MG tablet; Take 1 tablet (50 mg) by mouth daily  Dispense: 7 tablet; Refill: 0    2. Back strain, initial encounter  Muscle relaxer, steroid is noted  - methocarbamol (ROBAXIN) 750 MG tablet; Take 1 tablet (750 mg) by mouth 2 times daily as needed for muscle spasms  Dispense: 60 tablet; Refill: 0  - predniSONE (DELTASONE) 50 MG tablet; Take 1 tablet (50 mg) by mouth daily  Dispense: 7 tablet; Refill: 0    3. Type 2 diabetes mellitus with other specified complication, without long-term current use of insulin (H)  Watch blood sugars while on prednisone, continue current oral therapy.  Last hemoglobin A1c was 9.9.  Discussed the need for compliance, dietary restriction,  Patient hesitant to start additional medication at this time.  Does not tolerate metformin, we discussed the addition of Farxiga or similar medication.      4. Morbid obesity (H)  Recommend weight loss to responsible caloric restriction and exercise as tolerated                         FOLLOW UP   I have asked the patient to make an appointment for followup with me in 2 weeks        I have carefully explained the diagnosis and treatment options to the patient.  The patient has displayed an understanding of the above, and all subsequent questions were answered.      DO ROSE MARY Miller    Portions of this note were produced using [employer]  Although every attempt at real-time proof reading has been made, occasional grammar/syntax errors may have been missed.

## 2019-08-30 ENCOUNTER — MYC MEDICAL ADVICE (OUTPATIENT)
Dept: FAMILY MEDICINE | Facility: OTHER | Age: 39
End: 2019-08-30

## 2019-08-30 DIAGNOSIS — M25.40 JOINT SWELLING: Primary | ICD-10-CM

## 2019-09-01 ENCOUNTER — APPOINTMENT (OUTPATIENT)
Dept: CT IMAGING | Facility: CLINIC | Age: 39
End: 2019-09-01
Attending: EMERGENCY MEDICINE
Payer: COMMERCIAL

## 2019-09-01 ENCOUNTER — HOSPITAL ENCOUNTER (EMERGENCY)
Facility: CLINIC | Age: 39
Discharge: HOME | End: 2019-09-01
Attending: EMERGENCY MEDICINE | Admitting: EMERGENCY MEDICINE
Payer: COMMERCIAL

## 2019-09-01 VITALS
RESPIRATION RATE: 20 BRPM | BODY MASS INDEX: 49.84 KG/M2 | HEART RATE: 114 BPM | DIASTOLIC BLOOD PRESSURE: 104 MMHG | SYSTOLIC BLOOD PRESSURE: 191 MMHG | OXYGEN SATURATION: 100 % | WEIGHT: 299.5 LBS | TEMPERATURE: 99.7 F

## 2019-09-01 DIAGNOSIS — S00.03XA CONTUSION OF SCALP, INITIAL ENCOUNTER: ICD-10-CM

## 2019-09-01 PROCEDURE — 99284 EMERGENCY DEPT VISIT MOD MDM: CPT | Mod: 25 | Performed by: EMERGENCY MEDICINE

## 2019-09-01 PROCEDURE — 99283 EMERGENCY DEPT VISIT LOW MDM: CPT | Mod: Z6 | Performed by: EMERGENCY MEDICINE

## 2019-09-01 PROCEDURE — 70450 CT HEAD/BRAIN W/O DYE: CPT

## 2019-09-01 NOTE — ED AVS SNAPSHOT
[facility] Emergency Department  [address] [place] [address]  Phone:  [phone]  Fax:  [phone]                                    Waqas Allen   MRN: [id number]    Department:  [facility] Emergency Department   Date of Visit:  9/1/2019           After Visit Summary Signature Page    I have received my discharge instructions, and my questions have been answered. I have discussed any challenges I see with this plan with the nurse or doctor.    ..........................................................................................................................................  Patient/Patient Representative Signature      ..........................................................................................................................................  Patient Representative Print Name and Relationship to Patient    ..................................................               ................................................  Date                                   Time    ..........................................................................................................................................  Reviewed by Signature/Title    ...................................................              ..............................................  Date                                               Time          22EPIC Rev 08/18
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
## 2019-09-10 ENCOUNTER — MYC MEDICAL ADVICE (OUTPATIENT)
Dept: FAMILY MEDICINE | Facility: OTHER | Age: 39
End: 2019-09-10

## 2019-09-10 DIAGNOSIS — M10.072 ACUTE IDIOPATHIC GOUT INVOLVING TOE OF LEFT FOOT: Primary | ICD-10-CM

## 2019-09-10 RX ORDER — HYDROCODONE BITARTRATE AND ACETAMINOPHEN 5; 325 MG/1; MG/1
1 TABLET ORAL EVERY 6 HOURS PRN
Qty: 60 TABLET | Refills: 0 | Status: SHIPPED | OUTPATIENT
Start: 2019-09-10 | End: 2020-02-04

## 2019-09-10 NOTE — TELEPHONE ENCOUNTER
I have signed a prescription for 60 of the hydrocodone/Tylenol combination.  The patient can pick this up tomorrow morning.  He is to use it sparingly.  He may wish to use over-the-counter lidocaine cream on his toe as well.  He may also wish to repeat the prednisone burst if this has brought him previous relief.    Miley

## 2019-09-11 RX ORDER — PREDNISONE 10 MG/1
TABLET ORAL
Qty: 30 TABLET | Refills: 0 | Status: SHIPPED | OUTPATIENT
Start: 2019-09-11 | End: 2019-09-20

## 2019-09-20 DIAGNOSIS — M10.072 ACUTE IDIOPATHIC GOUT INVOLVING TOE OF LEFT FOOT: ICD-10-CM

## 2019-09-20 RX ORDER — PREDNISONE 5 MG/1
5 TABLET ORAL DAILY
Qty: 14 TABLET | Refills: 0 | Status: SHIPPED | OUTPATIENT
Start: 2019-09-20 | End: 2019-11-13

## 2019-09-20 NOTE — TELEPHONE ENCOUNTER
Prednisone 10 MG       Last Written Prescription Date:  8/23/19  Last Fill Quantity: 7,   # refills: 0  Last Office Visit: 8/23/19  Future Office visit:       Routing refill request to provider for review/approval because:  Drug not on the [facility], [facility] or [facility] refill protocol or controlled substance

## 2019-09-28 ENCOUNTER — HEALTH MAINTENANCE LETTER (OUTPATIENT)
Age: 39
End: 2019-09-28

## 2019-10-08 ENCOUNTER — TRANSFERRED RECORDS (OUTPATIENT)
Dept: HEALTH INFORMATION MANAGEMENT | Facility: CLINIC | Age: 39
End: 2019-10-08

## 2019-10-13 DIAGNOSIS — S39.012A BACK STRAIN, INITIAL ENCOUNTER: ICD-10-CM

## 2019-10-14 RX ORDER — METHOCARBAMOL 750 MG/1
750 TABLET, FILM COATED ORAL 2 TIMES DAILY PRN
Qty: 60 TABLET | Refills: 0 | Status: SHIPPED | OUTPATIENT
Start: 2019-10-14 | End: 2019-12-07

## 2019-10-14 NOTE — TELEPHONE ENCOUNTER
Methocarbamol 750 MG       Last Written Prescription Date:  8/23/19  Last Fill Quantity: 60,   # refills: 0  Last Office Visit: 8/14/19  Future Office visit:       Routing refill request to provider for review/approval because:  Drug not on the [facility], [facility] or [facility] refill protocol or controlled substance

## 2019-10-26 ENCOUNTER — HEALTH MAINTENANCE LETTER (OUTPATIENT)
Age: 39
End: 2019-10-26

## 2019-11-13 ENCOUNTER — OFFICE VISIT (OUTPATIENT)
Dept: FAMILY MEDICINE | Facility: OTHER | Age: 39
End: 2019-11-13
Payer: COMMERCIAL

## 2019-11-13 ENCOUNTER — ANCILLARY PROCEDURE (OUTPATIENT)
Dept: GENERAL RADIOLOGY | Facility: OTHER | Age: 39
End: 2019-11-13
Attending: FAMILY MEDICINE
Payer: COMMERCIAL

## 2019-11-13 VITALS
RESPIRATION RATE: 18 BRPM | DIASTOLIC BLOOD PRESSURE: 88 MMHG | TEMPERATURE: 99 F | OXYGEN SATURATION: 100 % | SYSTOLIC BLOOD PRESSURE: 132 MMHG | BODY MASS INDEX: 48.99 KG/M2 | WEIGHT: 294.4 LBS | HEART RATE: 104 BPM

## 2019-11-13 DIAGNOSIS — M25.562 ACUTE PAIN OF LEFT KNEE: ICD-10-CM

## 2019-11-13 DIAGNOSIS — E11.69 TYPE 2 DIABETES MELLITUS WITH OTHER SPECIFIED COMPLICATION, WITHOUT LONG-TERM CURRENT USE OF INSULIN (H): ICD-10-CM

## 2019-11-13 DIAGNOSIS — M25.562 ACUTE PAIN OF LEFT KNEE: Primary | ICD-10-CM

## 2019-11-13 DIAGNOSIS — E66.01 MORBID OBESITY (H): ICD-10-CM

## 2019-11-13 PROBLEM — K42.9 UMBILICAL HERNIA: Status: ACTIVE | Noted: 2018-01-01

## 2019-11-13 LAB
BASOPHILS # BLD AUTO: 0 10E9/L (ref 0–0.2)
BASOPHILS NFR BLD AUTO: 0.4 %
CRP SERPL-MCNC: 4 MG/L (ref 0–8)
DIFFERENTIAL METHOD BLD: NORMAL
EOSINOPHIL # BLD AUTO: 0.1 10E9/L (ref 0–0.7)
EOSINOPHIL NFR BLD AUTO: 1.8 %
ERYTHROCYTE [DISTWIDTH] IN BLOOD BY AUTOMATED COUNT: 13.1 % (ref 10–15)
HBA1C MFR BLD: 11.3 % (ref 0–5.6)
HCT VFR BLD AUTO: 47.8 % (ref 40–53)
HGB BLD-MCNC: 16.3 G/DL (ref 13.3–17.7)
LYMPHOCYTES # BLD AUTO: 2.5 10E9/L (ref 0.8–5.3)
LYMPHOCYTES NFR BLD AUTO: 31.7 %
MCH RBC QN AUTO: 31 PG (ref 26.5–33)
MCHC RBC AUTO-ENTMCNC: 34.1 G/DL (ref 31.5–36.5)
MCV RBC AUTO: 91 FL (ref 78–100)
MONOCYTES # BLD AUTO: 0.4 10E9/L (ref 0–1.3)
MONOCYTES NFR BLD AUTO: 5.7 %
NEUTROPHILS # BLD AUTO: 4.7 10E9/L (ref 1.6–8.3)
NEUTROPHILS NFR BLD AUTO: 60.4 %
PLATELET # BLD AUTO: 234 10E9/L (ref 150–450)
RBC # BLD AUTO: 5.25 10E12/L (ref 4.4–5.9)
URATE SERPL-MCNC: 6.4 MG/DL (ref 3.5–7.2)
WBC # BLD AUTO: 7.7 10E9/L (ref 4–11)

## 2019-11-13 PROCEDURE — 84550 ASSAY OF BLOOD/URIC ACID: CPT | Performed by: FAMILY MEDICINE

## 2019-11-13 PROCEDURE — 36415 COLL VENOUS BLD VENIPUNCTURE: CPT | Performed by: FAMILY MEDICINE

## 2019-11-13 PROCEDURE — 73562 X-RAY EXAM OF KNEE 3: CPT | Mod: LT

## 2019-11-13 PROCEDURE — 99214 OFFICE O/P EST MOD 30 MIN: CPT | Mod: 25 | Performed by: FAMILY MEDICINE

## 2019-11-13 PROCEDURE — 86140 C-REACTIVE PROTEIN: CPT | Performed by: FAMILY MEDICINE

## 2019-11-13 PROCEDURE — 20610 DRAIN/INJ JOINT/BURSA W/O US: CPT | Performed by: FAMILY MEDICINE

## 2019-11-13 PROCEDURE — 83036 HEMOGLOBIN GLYCOSYLATED A1C: CPT | Performed by: FAMILY MEDICINE

## 2019-11-13 PROCEDURE — 85025 COMPLETE CBC W/AUTO DIFF WBC: CPT | Performed by: FAMILY MEDICINE

## 2019-11-13 ASSESSMENT — PAIN SCALES - GENERAL: PAINLEVEL: SEVERE PAIN (6)

## 2019-11-13 NOTE — PROGRESS NOTES
Subjective     Waqas Allen is a 39 year old male who presents to clinic today for the following health issues:    HPI   Chief Complaint   Patient presents with     Knee Pain     left knee pain started this morning, states that last year he took a fall on the ice and usually its his right knee.  States that he did not injury it a the time the pain started. denies swelling and brusing.  States that its painful to the touch.        Waqas is here today with his wife for acute onset of left knee pain.  Stated that he woke was woken up by the pain at 1:00 this morning.  He was feeling fine before went to bed.  It is on the medial aspect of the knee.  It is a constant, sharp, stabbing, and throbbing pain that is worse with standing, twisting or bending.  No unusual activity or trauma.  No swelling or redness.  Stated that he is seeing a rheumatologist for osteoarthritis.  Never had this kind of pain before.  No back or hip pain.  No fever or chills.  No history of gout.  It is really bothering him and not able to go to work this morning.  He is known to have diabetes which is not controlled.  He is also morbidly obese.  No other concerns.    Current Outpatient Medications   Medication Sig Dispense Refill     ASPIRIN 81 MG OR TABS ONE DAILY 100 3     blood glucose monitoring (NO BRAND SPECIFIED) test strip Use to test blood sugars 1 times daily or as directed 100 strip 3     glipiZIDE (GLUCOTROL) 10 MG tablet Take 2 tablets am, and 1 tablet pm 270 tablet 0     losartan (COZAAR) 100 MG tablet TAKE 1 TABLET BY MOUTH EVERY DAY 90 tablet 1     methocarbamol (ROBAXIN) 750 MG tablet TAKE 1 TABLET (750 MG) BY MOUTH 2 TIMES DAILY AS NEEDED FOR MUSCLE SPASMS 60 tablet 0     MICROLET LANCETS MISC Use to test blood sugars 1 time daily or as directed 100 each 3     minocycline (MINOCIN/DYNACIN) 100 MG capsule TAKE 1 CAPSULE BY MOUTH TWICE A DAY. 60 capsule 9     POTASSIUM GLUCONATE PO Take 1 tablet by mouth daily        "sildenafil (VIAGRA) 50 MG tablet Take 1 tablet (50 mg) by mouth daily as needed 30 min to 4 hrs before sex. Do not use with nitroglycerin, terazosin or doxazosin. 12 tablet 11     zinc 50 MG TABS Take 1 tablet by mouth daily       HYDROcodone-acetaminophen (NORCO) 5-325 MG tablet Take 1 tablet by mouth every 6 hours as needed for pain (Patient not taking: Reported on 11/13/2019) 60 tablet 0     MELATONIN PO        Allergies   Allergen Reactions     Anaprox [Naproxen Sodium]      Nausea/vomiting     Darvocet [Propoxyphene N-Apap] Other (See Comments)     \"felt like I had a hangover\".  Patient tolerates Tylenol/Acetaminophen     Hctz [Hydrochlorothiazide] Other (See Comments)     impotence     Indocin [Indomethacin] GI Disturbance     Metformin GI Disturbance     Tramadol GI Disturbance         Reviewed and updated as needed this visit by Provider         Review of Systems   ROS COMP: Constitutional, HEENT, cardiovascular, pulmonary, gi and gu systems are negative, except as otherwise noted.      Objective    /88   Pulse 104   Temp 99  F (37.2  C) (Temporal)   Resp 18   Wt 133.5 kg (294 lb 6.4 oz)   SpO2 100%   BMI 48.99 kg/m    Body mass index is 48.99 kg/m .  Physical Exam   GENERAL: alert and no distress.  Morbidly obese  RESP: lungs clear to auscultation - no rales, rhonchi or wheezes  CV: regular rate and rhythm, no murmur.  MS: no gross musculoskeletal defects noted, no edema.  Walk with limping to the left side.  Hips and ankle exams were normal. Knee with no effusion or redness.  Tender with palpation the knee diffusely, mainly on the medial aspect and the medial condyle. No crepitus.  Negative anterior and posterior draw test.  Negative varus and valgus stress test. Collateral ligamen exam was stable.      Diagnostic Test Results:  Labs reviewed in Epic  Results for orders placed or performed in visit on 11/13/19   CBC with platelets and differential     Status: None   Result Value Ref Range    " WBC 7.7 4.0 - 11.0 10e9/L    RBC Count 5.25 4.4 - 5.9 10e12/L    Hemoglobin 16.3 13.3 - 17.7 g/dL    Hematocrit 47.8 40.0 - 53.0 %    MCV 91 78 - 100 fl    MCH 31.0 26.5 - 33.0 pg    MCHC 34.1 31.5 - 36.5 g/dL    RDW 13.1 10.0 - 15.0 %    Platelet Count 234 150 - 450 10e9/L    % Neutrophils 60.4 %    % Lymphocytes 31.7 %    % Monocytes 5.7 %    % Eosinophils 1.8 %    % Basophils 0.4 %    Absolute Neutrophil 4.7 1.6 - 8.3 10e9/L    Absolute Lymphocytes 2.5 0.8 - 5.3 10e9/L    Absolute Monocytes 0.4 0.0 - 1.3 10e9/L    Absolute Eosinophils 0.1 0.0 - 0.7 10e9/L    Absolute Basophils 0.0 0.0 - 0.2 10e9/L    Diff Method Automated Method    CRP, inflammation     Status: None   Result Value Ref Range    CRP Inflammation 4.0 0.0 - 8.0 mg/L   Uric acid     Status: None   Result Value Ref Range    Uric Acid 6.4 3.5 - 7.2 mg/dL   Hemoglobin A1c     Status: Abnormal   Result Value Ref Range    Hemoglobin A1C 11.3 (H) 0 - 5.6 %           Assessment & Plan     1. Acute pain of left knee  Waqas presented with acute onset of the left knee pain, worse on the medial aspect.  No trauma or injury.  Never had this before.  Woke him up at 1:00 this morning.  He was fine before he went to bed.  No fever or chills.  No history of gout.  Seeing rheumatologist for osteoarthritis.  He is morbidly obese with uncontrolled diabetes.  Reviewed the x-ray today which showed no acute change or concern finding.  His pain is most likely due to arthritis flareup.  Labs as ordered which include CBC, CRP and uric acid and they were normal.  Unlikely this is septic or gouty arthritis.  The uric acid has been checked multiple times in the last year and it has been normal.    Patient requested a steroid injection today.  Stated he had an injection on the right knee once couple years ago and it worked well - did not affect his blood sugar much.  The pain is unbearable to him.  I had a long conversation with him about the hyperglycemia effect of the  steroid injection which I am quite concerned as his diabetes not controlled.  Hemoglobin A1c today was 11.3.  He insisted to get the steroid injection today and is willing to take the risk.    He received the steroid injection today and please see my procedure note for further details.  In the meantime, encouraged him to work with his doctor to get the diabetic better control.  Had a long discussion with him about healthy diet, daily exercise and weight loss.  He is aware that his blood sugar will be more elevated in the next several days from the steroid injection.  Emphasized the importance of drinking a lot of water.  Informed to him about the potential DKA/HH complication from uncontrolled BS and therefore follow up immediately if develop intolerable to oral intake or has any concern.    For his, continue his normal activities as tolerated.  Follow-up with his primary care provider if the symptom persists or gets worse.    - CBC with platelets and differential  - CRP, inflammation  - Uric acid  - XR Knee Left 3 Views; Future    2. Type 2 diabetes mellitus with other specified complication, without long-term current use of insulin (H)  Today's Hgb A1c is 11.3.  Discussed with patient about the nature of the condition.  Emphrasize the importance of getting his DM under controlled. Educate him about long term and short term consequences of uncontrolled DM as well as the goal for his fasting blood sugar and HgB A1C.  Encouraged to monitor his blood sugar 1-2 times a day.  Discussed with about treatment options.  He takes only Glipizide - not tolerating the metformin.  He wanted to address this with is doctor - not wanting to change the medication today.  Encourage to eat 3 healthy meals a day avoid high sugar/carb food or snacks.  Also enocurage to excercise daily.  Follow up with is doctor asap  - Hemoglobin A1c    3. Morbid obesity (H)  Discussed about exercise and healthy diet.  He is not interested to discuss  "about this today.       BMI:   Estimated body mass index is 48.99 kg/m  as calculated from the following:    Height as of 7/9/19: 1.651 m (5' 5\").    Weight as of this encounter: 133.5 kg (294 lb 6.4 oz).   Weight management plan: Discussed healthy diet and exercise guidelines      Return in about 2 weeks (around 11/27/2019) for dm follow up with is PCP.    Rica Merrill Mai, MD  [facility]        "

## 2019-11-16 NOTE — PROCEDURES
Procedure:  Knee Kenolog injection  Indication:  Knee pain with arthritis.    The whole precedure discussed with the patient.  Risks associated with the procedure was also discussed, which include but not limited pain, bleeding and infection.  Alternatives were also discussed.  Patient requested to have the injection on his left knee today.  Consent was obtained.    Time out performed - he was identified times three.    The whole procedure was done in an usual sterile maner.  The inserted site was cleaned with Iodine solution.  A mixture of 1 cc of 0.25% Marcain, 3 cc of 1% Lidocain and 1 cc of Kenolog (40 mg) inject directly into the left knee, entering at the upper outer quadrant.  Succeeded with first attempt.  Patient tolerates well and has significant relief from the injection.  Post procedure care discussed and educate about symptoms that need to be seen or call in.    Rica Portillo MD.

## 2019-12-06 DIAGNOSIS — L70.0 ACNE VULGARIS: ICD-10-CM

## 2019-12-06 RX ORDER — MINOCYCLINE HYDROCHLORIDE 100 MG/1
CAPSULE ORAL
Qty: 60 CAPSULE | Refills: 11 | Status: SHIPPED | OUTPATIENT
Start: 2019-12-06 | End: 2020-11-30

## 2019-12-06 NOTE — TELEPHONE ENCOUNTER
"minicycline  Last Written Prescription Date:  02/06/2019  Last Fill Quantity: 60,  # refills: 9   Last office visit: 11/13/2019 with prescribing provider:     Future Office Visit:    Requested Prescriptions   Pending Prescriptions Disp Refills     minocycline (MINOCIN/DYNACIN) 100 MG capsule [Pharmacy Med Name: MINOCYCLINE 100MG CAPSULE] 60 capsule 9     Sig: TAKE 1 CAPSULE BY MOUTH TWICE A DAY.       Oral Acne/Rosacea Medications Protocol Failed - 12/6/2019  1:02 AM        Failed - Confirmation of diagnosis is required     Please confirm diagnosis is acne or rosacea.     If NOT acne or rosacea; refer request to provider for further evaluation.    If diagnosis IS acne or rosacea, OK to refill BASED ON PREVIOUS REFILL CLINICAL NOTE RECOMMENDATION.          Passed - Patient is 12 years of age or older        Passed - Recent (12 mo) or future (30 days) visit within the authorizing provider's specialty     Patient has had an office visit with the authorizing provider or a provider within the authorizing providers department within the previous 12 mos or has a future within next 30 days. See \"Patient Info\" tab in inbasket, or \"Choose Columns\" in Meds & Orders section of the refill encounter.              Passed - Medication is active on med list        Prescription approved per [facility] [place] [facility].  Yissel Cary RN BSN    "

## 2019-12-07 DIAGNOSIS — S39.012A BACK STRAIN, INITIAL ENCOUNTER: ICD-10-CM

## 2019-12-09 RX ORDER — METHOCARBAMOL 750 MG/1
750 TABLET, FILM COATED ORAL 2 TIMES DAILY PRN
Qty: 60 TABLET | Refills: 0 | Status: SHIPPED | OUTPATIENT
Start: 2019-12-09 | End: 2020-02-06

## 2019-12-09 NOTE — TELEPHONE ENCOUNTER
Robaxin      Last Written Prescription Date:  10/14/2019  Last Fill Quantity: 60,   # refills: 0  Last Office Visit: 11/13/2019  Future Office visit:       Routing refill request to provider for review/approval because:  Drug not on the [facility], P or [facility] refill protocol or controlled substance

## 2019-12-17 DIAGNOSIS — E11.69 TYPE 2 DIABETES MELLITUS WITH OTHER SPECIFIED COMPLICATION, WITHOUT LONG-TERM CURRENT USE OF INSULIN (H): Primary | ICD-10-CM

## 2019-12-17 RX ORDER — GLIPIZIDE 10 MG/1
TABLET ORAL
Qty: 270 TABLET | Refills: 0 | Status: SHIPPED | OUTPATIENT
Start: 2019-12-17 | End: 2020-04-06

## 2019-12-17 NOTE — TELEPHONE ENCOUNTER
Routing refill request to provider for review/approval because:  Labs out of range:  LDL, Microlabumin  Labs not current:  LDL    MARCIO WalshN, RN  [facility]

## 2019-12-17 NOTE — TELEPHONE ENCOUNTER
Requested Prescriptions   Pending Prescriptions Disp Refills     glipiZIDE (GLUCOTROL) 10 MG tablet [Pharmacy Med Name: GLIPIZIDE 10MG TABLET] 270 tablet 0     Sig: TAKE 2 TABLETS (20MG) BY MOUTH EVERY A.M. AND 1 TABLET (10MG) EVERYEVENING   Last Written Prescription Date:  4/17/19  Last Fill Quantity: 270,  # refills: 0   Last office visit: 11/13/2019 with prescribing provider:  8/23/19   Future Office Visit:   Next 5 appointments (look out 90 days)    Dec 23, 2019  3:40 PM CST  MyChart Short with Chance Trent,   [facility] [address] [facility] [address] [phone]           Sulfonylurea Agents Failed - 12/17/2019  1:02 AM        Failed - Patient has documented LDL within the past 12 mos.     Recent Labs   Lab Test 12/12/18  0936   LDL Cannot estimate LDL when triglyceride exceeds 400 mg/dL             Passed - Blood pressure less than 140/90 in past 6 months     BP Readings from Last 3 Encounters:   11/13/19 132/88   09/01/19 (!) 191/104   08/23/19 (!) 148/92                 Passed - Patient has had a Microalbumin in the past 15 mos.     Recent Labs   Lab Test 07/10/19  1602   MICROL 37   UMALCR 31.19*             Passed - Patient has documented A1c within the specified period of time.     If HgbA1C is 8 or greater, it needs to be on file within the past 3 months.  If less than 8, must be on file within the past 6 months.     Recent Labs   Lab Test 11/13/19  0956   A1C 11.3*             Passed - Medication is active on med list        Passed - Patient is age 18 or older        Passed - Patient has a recent creatinine (normal) within the past 12 mos.     Recent Labs   Lab Test 07/05/19  0037   CR 0.98             Passed - Recent (6 mo) or future (30 days) visit within the authorizing provider's specialty     Patient had office visit in the last 6 months or has a visit in the next 30 days with authorizing provider or within the authorizing provider's  "specialty.  See \"Patient Info\" tab in inbasket, or \"Choose Columns\" in Meds & Orders section of the refill encounter.            "

## 2020-01-07 ENCOUNTER — OFFICE VISIT (OUTPATIENT)
Dept: FAMILY MEDICINE | Facility: OTHER | Age: 40
End: 2020-01-07
Payer: COMMERCIAL

## 2020-01-07 VITALS
BODY MASS INDEX: 49.78 KG/M2 | HEIGHT: 65 IN | HEART RATE: 106 BPM | DIASTOLIC BLOOD PRESSURE: 92 MMHG | WEIGHT: 298.8 LBS | SYSTOLIC BLOOD PRESSURE: 148 MMHG | OXYGEN SATURATION: 99 % | TEMPERATURE: 99.5 F | RESPIRATION RATE: 16 BRPM

## 2020-01-07 DIAGNOSIS — E11.69 TYPE 2 DIABETES MELLITUS WITH OTHER SPECIFIED COMPLICATION, WITHOUT LONG-TERM CURRENT USE OF INSULIN (H): Primary | ICD-10-CM

## 2020-01-07 DIAGNOSIS — M67.442 GANGLION CYST OF FLEXOR TENDON SHEATH OF FINGER OF LEFT HAND: ICD-10-CM

## 2020-01-07 DIAGNOSIS — I10 HYPERTENSION GOAL BP (BLOOD PRESSURE) < 140/90: ICD-10-CM

## 2020-01-07 DIAGNOSIS — E66.01 MORBID OBESITY (H): ICD-10-CM

## 2020-01-07 DIAGNOSIS — E78.5 HYPERLIPIDEMIA LDL GOAL <100: ICD-10-CM

## 2020-01-07 PROCEDURE — 99214 OFFICE O/P EST MOD 30 MIN: CPT | Performed by: INTERNAL MEDICINE

## 2020-01-07 PROCEDURE — 99207 C FOOT EXAM  NO CHARGE: CPT | Performed by: INTERNAL MEDICINE

## 2020-01-07 RX ORDER — MELOXICAM 10 MG/1
CAPSULE ORAL
COMMUNITY
End: 2020-01-24

## 2020-01-07 RX ORDER — DAPAGLIFLOZIN 5 MG/1
5 TABLET, FILM COATED ORAL DAILY
Qty: 30 TABLET | Refills: 0 | Status: SHIPPED | OUTPATIENT
Start: 2020-01-07 | End: 2020-02-06

## 2020-01-07 ASSESSMENT — PAIN SCALES - GENERAL: PAINLEVEL: SEVERE PAIN (6)

## 2020-01-07 ASSESSMENT — MIFFLIN-ST. JEOR: SCORE: 2202.23

## 2020-01-07 NOTE — PROGRESS NOTES
Chief Complaint   Patient presents with     Lump on Hand     painful lump on left hand for a couple months                     Chief Complaint         The patient is a pleasant 39-year-old gentleman who presents today with a occasionally painful lump on his left hand.  It is at the extensor proximal interphalangeal joint of his middle finger.  It is a small tendon sheath cyst.  No deformity, inflammation, or lack of function is noted.  We have discussed treatment options in detail and at this time I recommend observation.  We did have a discussion regarding his diabetes, he notes that he is not checking his blood sugars regularly.  His most recent hemoglobin A1c was in November and at that time it was 11.3.  He is not take metformin due to severe GI disturbances and as such is taking only the Glucotrol 10 mg daily.  We discussed appropriate diet etc.  We also discussed the addition of Farxiga.                       PAST, FAMILY,SOCIAL HISTORY:     Medical  History:   has a past medical history of Arthritis, Diabetes (H) (2013), Unspecified essential hypertension, Variants of migraine, not elsewhere classified, without mention of intractable migraine without mention of status migrainosus, and Varicella without mention of complication (1993).     Surgical History:   has a past surgical history that includes laminect/discectomy, lumbar (04/24/2007); Arthroscopy shoulder decompression (Left, 1/20/2016); Arthroscopy shoulder distal clavicle repair (Left, 1/20/2016); Arthroscopy shoulder distal clavicle repair (Right, 2/1/2017); and Arthroscopy shoulder decompression (Right, 2/1/2017).     Social History:   reports that he has never smoked. He has never used smokeless tobacco. He reports current alcohol use of about 1.0 - 2.0 standard drinks of alcohol per week. He reports that he does not use drugs.     Family History:  family history includes Cerebrovascular Disease in his maternal grandfather; Hypertension in his  brother, brother, and father; Unknown/Adopted in his father.            MEDICATIONS  Current Outpatient Medications   Medication Sig Dispense Refill     ASPIRIN 81 MG OR TABS ONE DAILY 100 3     dapagliflozin (FARXIGA) 5 MG TABS tablet Take 1 tablet (5 mg) by mouth daily 30 tablet 0     glipiZIDE (GLUCOTROL) 10 MG tablet TAKE 2 TABLETS (20MG) BY MOUTH EVERY A.M. AND 1 TABLET (10MG) EVERYEVENING 270 tablet 0     losartan (COZAAR) 100 MG tablet TAKE 1 TABLET BY MOUTH EVERY DAY 90 tablet 1     Meloxicam 10 MG CAPS        methocarbamol (ROBAXIN) 750 MG tablet TAKE 1 TABLET (750 MG) BY MOUTH 2 TIMES DAILY AS NEEDED FOR MUSCLE SPASMS 60 tablet 0     minocycline (MINOCIN/DYNACIN) 100 MG capsule TAKE 1 CAPSULE BY MOUTH TWICE A DAY. 60 capsule 11     POTASSIUM GLUCONATE PO Take 1 tablet by mouth daily       sildenafil (VIAGRA) 50 MG tablet Take 1 tablet (50 mg) by mouth daily as needed 30 min to 4 hrs before sex. Do not use with nitroglycerin, terazosin or doxazosin. 12 tablet 11     zinc 50 MG TABS Take 1 tablet by mouth daily       blood glucose monitoring (NO BRAND SPECIFIED) test strip Use to test blood sugars 1 times daily or as directed 100 strip 3     HYDROcodone-acetaminophen (NORCO) 5-325 MG tablet Take 1 tablet by mouth every 6 hours as needed for pain (Patient not taking: Reported on 11/13/2019) 60 tablet 0     MELATONIN PO        MICROLET LANCETS MISC Use to test blood sugars 1 time daily or as directed 100 each 3         --------------------------------------------------------------------------------------------------------------------                              Review of Systems       LUNGS: Pt denies: cough, excess sputum, hemoptysis, or shortness of breath.   HEART: Pt denies: chest pain, arrhythmia, syncope, tachy or bradyarrhythmia.   GI: Pt denies: nausea, vomiting, diarrhea, constipation, melena, or hematochezia.   NEURO: Pt denies: seizures, strokes, diplopia, weakness, paraesthesias, or  "paralysis.   SKIN: Pt denies: itching, rashes, discoloration, or specific lesions of concern. Denies recent hair loss.   PSYCH: The patient denies significant depression, anxiety, mood imbalance. Specifically denies any suicidal ideation.                                     Examination    BP (!) 148/92   Pulse 106   Temp 99.5  F (37.5  C) (Temporal)   Resp 16   Ht 1.651 m (5' 5\")   Wt 135.5 kg (298 lb 12.8 oz)   SpO2 99%   BMI 49.72 kg/m       Constitutional: The patient appears to be in no acute distress. The patient appears to be adequately hydrated. No acute respiratory or hemodynamic distress is noted at this time.   LUNGS: clear bilaterally, airflow is brisk, no intercostal retraction or stridor is noted. No coughing is noted during visit.   HEART:  regular without rubs, clicks, gallops, or murmurs. PMI is nondisplaced. Upstrokes are brisk. S1,S2 are heard.   GI: Abdomen is soft, without rebound, guarding or tenderness. Bowel sounds are appropriate. No renal bruits are heard.   NEURO: Pt is alert and appropriate. No neurologic lateralization is noted. Cranial nerves 2-12 are intact. Peripheral sensory and motor function are grossly normal.    SKIN:  warm and dry. No erythema, or rashes are noted. No specific lesions of concern are noted.    PSYCH: The patient appears grossly appropriate. Maintains good eye contact, does not have any jittery or atypical motion. Displays appropriate affect.   MS: Minimal crepitance is noted in the extremities. No deformity is present. Muscle strength is appropriate and equal bilaterally. No acute joint erythema or swelling is present.  The hand is noted as above.   Feet: no evidence of skin breakdown or ulceration is noted.  Sensation is intact to monofilament and vibration.  Pulses are strong, capillary refill is brisk.                                        Decision Making  1. Ganglion cyst of flexor tendon sheath of finger of left hand  No code found for extensor " tendon.  Recommend observation    2. Type 2 diabetes mellitus with other specified complication, without long-term current use of insulin (H)  Start Farxiga, emphasize diet, continue current medications including losartan, glipizide.  Continue aspirin, will continue to discuss statin therapy though patient somewhat hesitant  - FOOT EXAM  - dapagliflozin (FARXIGA) 5 MG TABS tablet; Take 1 tablet (5 mg) by mouth daily  Dispense: 30 tablet; Refill: 0    3. Hypertension goal BP (blood pressure) < 140/90  Currently controlled.        4. Hyperlipidemia LDL goal <100  Discuss statin  - Lipid panel reflex to direct LDL Fasting; Future    The patient is obese.  We have discussed weight management and I have recommended responsible caloric restriction in combination with exercise as tolerated.                      FOLLOW UP   I have asked the patient to make an appointment for followup with me in 1        I have carefully explained the diagnosis and treatment options to the patient.  The patient has displayed an understanding of the above, and all subsequent questions were answered.      DO ROSE MARY Miller    Portions of this note were produced using [email or web address]  Although every attempt at real-time proof reading has been made, occasional grammar/syntax errors may have been missed.

## 2020-01-22 ENCOUNTER — TELEPHONE (OUTPATIENT)
Dept: FAMILY MEDICINE | Facility: OTHER | Age: 40
End: 2020-01-22
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
## 2020-01-24 ENCOUNTER — OFFICE VISIT (OUTPATIENT)
Dept: FAMILY MEDICINE | Facility: OTHER | Age: 40
End: 2020-01-24
Payer: COMMERCIAL

## 2020-01-24 VITALS
HEIGHT: 65 IN | WEIGHT: 294 LBS | RESPIRATION RATE: 22 BRPM | DIASTOLIC BLOOD PRESSURE: 80 MMHG | TEMPERATURE: 97.8 F | BODY MASS INDEX: 48.98 KG/M2 | SYSTOLIC BLOOD PRESSURE: 138 MMHG | HEART RATE: 102 BPM | OXYGEN SATURATION: 100 %

## 2020-01-24 DIAGNOSIS — S16.1XXA ACUTE STRAIN OF NECK MUSCLE, INITIAL ENCOUNTER: ICD-10-CM

## 2020-01-24 DIAGNOSIS — I10 HYPERTENSION GOAL BP (BLOOD PRESSURE) < 140/90: ICD-10-CM

## 2020-01-24 DIAGNOSIS — E11.69 TYPE 2 DIABETES MELLITUS WITH OTHER SPECIFIED COMPLICATION, WITHOUT LONG-TERM CURRENT USE OF INSULIN (H): Primary | ICD-10-CM

## 2020-01-24 LAB — HBA1C MFR BLD: 12.2 % (ref 0–5.6)

## 2020-01-24 PROCEDURE — 36415 COLL VENOUS BLD VENIPUNCTURE: CPT | Performed by: INTERNAL MEDICINE

## 2020-01-24 PROCEDURE — 99214 OFFICE O/P EST MOD 30 MIN: CPT | Performed by: INTERNAL MEDICINE

## 2020-01-24 PROCEDURE — 83036 HEMOGLOBIN GLYCOSYLATED A1C: CPT | Performed by: INTERNAL MEDICINE

## 2020-01-24 RX ORDER — MELOXICAM 10 MG/1
1 CAPSULE ORAL DAILY
Qty: 90 CAPSULE | Refills: 0 | Status: SHIPPED | OUTPATIENT
Start: 2020-01-24 | End: 2022-11-11

## 2020-01-24 RX ORDER — TIZANIDINE HYDROCHLORIDE 4 MG/1
4 CAPSULE, GELATIN COATED ORAL AT BEDTIME
Qty: 30 CAPSULE | Refills: 0 | Status: SHIPPED | OUTPATIENT
Start: 2020-01-24 | End: 2020-02-12

## 2020-01-24 ASSESSMENT — PAIN SCALES - GENERAL: PAINLEVEL: SEVERE PAIN (7)

## 2020-01-24 ASSESSMENT — MIFFLIN-ST. JEOR: SCORE: 2180.46
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
## 2020-01-24 NOTE — NURSING NOTE
Health Maintenance Due   Topic Date Due     PREVENTIVE CARE VISIT  1980     EYE EXAM  1980     PNEUMOCOCCAL IMMUNIZATION 19-64 MEDIUM RISK (1 of 1 - PPSV23) 04/12/2000     DTAP/TDAP/TD IMMUNIZATION (4 - Td) 03/22/2003     INFLUENZA VACCINE (1) 09/01/2019     LIPID  12/12/2019     PHQ-2  01/01/2020     Health Maintenance reviewed at today's visit patient asked to schedule/complete:   Today's visit is through [facility] insurance, cannot update HM today  Kenya Ricks MA     1/24/2020

## 2020-01-28 NOTE — RESULT ENCOUNTER NOTE
Dear Waqas, your recent test results are attached.  The hemoglobin A1c has gone up from 11.302 months ago up to 12.2 suggesting substantial worsening in blood sugar control.  Please set up an appointment so we can discuss further medications and management techniques.  I would also recommend consultation with the medical therapy pharmacist.    Feel free to contact me via the office or My Chart if you have any questions regarding the above.  Sincerely,  DO ADEOLA MillerOI

## 2020-02-03 ENCOUNTER — MYC MEDICAL ADVICE (OUTPATIENT)
Dept: FAMILY MEDICINE | Facility: OTHER | Age: 40
End: 2020-02-03

## 2020-02-04 DIAGNOSIS — I10 HYPERTENSION GOAL BP (BLOOD PRESSURE) < 140/90: ICD-10-CM

## 2020-02-05 DIAGNOSIS — E11.69 TYPE 2 DIABETES MELLITUS WITH OTHER SPECIFIED COMPLICATION, WITHOUT LONG-TERM CURRENT USE OF INSULIN (H): ICD-10-CM

## 2020-02-05 DIAGNOSIS — S39.012A BACK STRAIN, INITIAL ENCOUNTER: ICD-10-CM

## 2020-02-05 RX ORDER — LOSARTAN POTASSIUM 100 MG/1
TABLET ORAL
Qty: 90 TABLET | Refills: 1 | Status: SHIPPED | OUTPATIENT
Start: 2020-02-05 | End: 2020-08-31

## 2020-02-05 NOTE — TELEPHONE ENCOUNTER
Prescription approved per [facility] Refill Protocol.    MARCIO WalshN, RN  [facility]

## 2020-02-06 RX ORDER — METHOCARBAMOL 750 MG/1
750 TABLET, FILM COATED ORAL 2 TIMES DAILY PRN
Qty: 60 TABLET | Refills: 0 | Status: SHIPPED | OUTPATIENT
Start: 2020-02-06 | End: 2020-04-14

## 2020-02-06 RX ORDER — DAPAGLIFLOZIN 5 MG/1
TABLET, FILM COATED ORAL
Qty: 30 TABLET | Refills: 1 | Status: SHIPPED | OUTPATIENT
Start: 2020-02-06 | End: 2020-04-02

## 2020-02-06 NOTE — TELEPHONE ENCOUNTER
Requested Prescriptions   Pending Prescriptions Disp Refills     methocarbamol (ROBAXIN) 750 MG tablet [Pharmacy Med Name: METHOCARBAMOL 750MG TABLET] 60 tablet 0     Sig: TAKE 1 TABLET (750 MG) BY MOUTH 2 TIMES DAILY AS NEEDED FOR MUSCLE SPASMS   Last Written Prescription Date:  12/9/19  Last Fill Quantity: 60,  # refills: 0   Last office visit: 1/24/2020 with prescribing provider:  1/24/2020   Future Office Visit:   Next 5 appointments (look out 90 days)    Feb 11, 2020  3:40 PM CST  [facility]Chart [facility] with [facility],   [facility] [address] [facility] [address]  [phone]           There is no refill protocol information for this order        FARXIGA 5 MG TABS tablet [Pharmacy Med Name: dapagliflozin (FARXIGA) 5 MG TABS tablet] 30 tablet 0     Sig: TAKE 1 TABLET (5MG) BY MOUTH EVERY DAY   Last Written Prescription Date:  1/7/2020  Last Fill Quantity: 30,  # refills: 0   Last office visit: 1/24/2020 with prescribing provider:  1/24/2020   Future Office Visit:   Next 5 appointments (look out 90 days)    Feb 11, 2020  3:40 PM CST  "[employer]"harfernanda Short with Chance Trent DO  [facility] ([facility] [address] [place] [address]  [phone]           Sodium Glucose Co-Transport Inhibitor Agents Failed - 2/5/2020  8:46 AM        Failed - Patient has documented LDL within the past 12 mos.     Recent Labs   Lab Test 12/12/18  0936   LDL Cannot estimate LDL when triglyceride exceeds 400 mg/dL             Passed - Blood pressure less than 140/90 in past 6 months     BP Readings from Last 3 Encounters:   01/24/20 138/80   01/07/20 (!) 148/92   11/13/19 132/88                 Passed - Patient has had a Microalbumin in the past 15 mos.     Recent Labs   Lab Test 07/10/19  1602   MICROL 37   UMALCR 31.19*             Passed - Patient has documented A1c within the specified period of time.     If HgbA1C is 8 or greater,  "it needs to be on file within the past 3 months.  If less than 8, must be on file within the past 6 months.     Recent Labs   Lab Test 01/24/20  1023   A1C 12.2*             Passed - No creatinine >1.4 or GFR <45 within the past 12 mos     Recent Labs   Lab Test 07/05/19  0037   GFRESTIMATED >90   GFRESTBLACK >90       Recent Labs   Lab Test 07/05/19  0037   CR 0.98             Passed - Medication is active on med list        Passed - Patient is age 18 or older        Passed - Patient has documented normal Potassium within the last 12 mos.     Recent Labs   Lab Test 07/05/19  0037   POTASSIUM 4.0             Passed - Recent (6 mo) or future (30 days) visit within the authorizing provider's specialty     Patient had office visit in the last 6 months or has a visit in the next 30 days with authorizing provider or within the authorizing provider's specialty.  See \"Patient Info\" tab in inbasket, or \"Choose Columns\" in Meds & Orders section of the refill encounter.            "

## 2020-02-06 NOTE — TELEPHONE ENCOUNTER
Routing refill request to provider for review/approval because:  Drug not on the [facility] refill protocol (Robaxin)  Labs not current:  MARCIO TavarezN, RN  [facility]

## 2020-02-11 ENCOUNTER — OFFICE VISIT (OUTPATIENT)
Dept: FAMILY MEDICINE | Facility: OTHER | Age: 40
End: 2020-02-11
Payer: COMMERCIAL

## 2020-02-11 VITALS
DIASTOLIC BLOOD PRESSURE: 80 MMHG | SYSTOLIC BLOOD PRESSURE: 138 MMHG | WEIGHT: 294.2 LBS | RESPIRATION RATE: 18 BRPM | HEART RATE: 114 BPM | BODY MASS INDEX: 48.96 KG/M2 | OXYGEN SATURATION: 98 % | TEMPERATURE: 97.8 F

## 2020-02-11 DIAGNOSIS — R51.9 ACUTE INTRACTABLE HEADACHE, UNSPECIFIED HEADACHE TYPE: Primary | ICD-10-CM

## 2020-02-11 DIAGNOSIS — E66.01 MORBID OBESITY (H): ICD-10-CM

## 2020-02-11 DIAGNOSIS — I10 HYPERTENSION GOAL BP (BLOOD PRESSURE) < 140/90: ICD-10-CM

## 2020-02-11 DIAGNOSIS — E11.69 TYPE 2 DIABETES MELLITUS WITH OTHER SPECIFIED COMPLICATION, WITHOUT LONG-TERM CURRENT USE OF INSULIN (H): ICD-10-CM

## 2020-02-11 DIAGNOSIS — S13.4XXD WHIPLASH INJURY TO NECK, SUBSEQUENT ENCOUNTER: ICD-10-CM

## 2020-02-11 DIAGNOSIS — S16.1XXA ACUTE STRAIN OF NECK MUSCLE, INITIAL ENCOUNTER: ICD-10-CM

## 2020-02-11 PROCEDURE — 99214 OFFICE O/P EST MOD 30 MIN: CPT | Performed by: INTERNAL MEDICINE

## 2020-02-11 ASSESSMENT — PAIN SCALES - GENERAL: PAINLEVEL: MODERATE PAIN (5)
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
## 2020-02-11 NOTE — TELEPHONE ENCOUNTER
tiZANidine (ZANAFLEX) 4 MG tablet      Last Written Prescription Date:  1/24/2020  Last Fill Quantity: 30,   # refills: 0  Last Office Visit: 2/11/2020  Future Office visit:       Routing refill request to provider for review/approval because:  Drug not on the [facility], [facility] or [facility] refill protocol or controlled substance

## 2020-02-11 NOTE — TELEPHONE ENCOUNTER
Requested Prescriptions   Pending Prescriptions Disp Refills     tiZANidine (ZANAFLEX) 4 MG tablet [Pharmacy Med Name: TIZANIDINE 4MG TABLET] 30 tablet      Sig: TAKE 1 TABLET BY MOUTH DAILY AT BEDTIME       There is no refill protocol information for this order

## 2020-02-13 ENCOUNTER — HOSPITAL ENCOUNTER (OUTPATIENT)
Dept: CT IMAGING | Facility: CLINIC | Age: 40
Discharge: HOME | End: 2020-02-13
Attending: INTERNAL MEDICINE | Admitting: INTERNAL MEDICINE
Payer: COMMERCIAL

## 2020-02-13 ENCOUNTER — HOSPITAL ENCOUNTER (OUTPATIENT)
Dept: GENERAL RADIOLOGY | Facility: CLINIC | Age: 40
End: 2020-02-13
Attending: INTERNAL MEDICINE
Payer: COMMERCIAL

## 2020-02-13 DIAGNOSIS — S13.4XXD WHIPLASH INJURY TO NECK, SUBSEQUENT ENCOUNTER: ICD-10-CM

## 2020-02-13 DIAGNOSIS — R51.9 ACUTE INTRACTABLE HEADACHE, UNSPECIFIED HEADACHE TYPE: ICD-10-CM

## 2020-02-13 LAB — RADIOLOGIST FLAGS: ABNORMAL

## 2020-02-13 PROCEDURE — 70450 CT HEAD/BRAIN W/O DYE: CPT

## 2020-02-13 PROCEDURE — 72040 X-RAY EXAM NECK SPINE 2-3 VW: CPT | Mod: TC

## 2020-02-18 NOTE — RESULT ENCOUNTER NOTE
Dear Waqas, your recent test results are attached.  CAT scan of the head is unremarkable.  You will be contacted with any outstanding results when they are available.  Feel free to contact me via the office or My Chart if you have any questions regarding the above.  Sincerely,  DO ROSE MARY Miller

## 2020-02-18 NOTE — RESULT ENCOUNTER NOTE
Dear Waqas, your recent test results are attached.  X-ray of the cervical spine demonstrates no acute fractures or irregularities.  You will be contacted with any outstanding results when they are available.  Feel free to contact me via the office or My Chart if you have any questions regarding the above.  Sincerely,  Chance Trent,  FACOI

## 2020-02-24 ENCOUNTER — TELEPHONE (OUTPATIENT)
Dept: FAMILY MEDICINE | Facility: OTHER | Age: 40
End: 2020-02-24

## 2020-02-24 NOTE — TELEPHONE ENCOUNTER
Reason for Call:  Form, our goal is to have forms completed with 72 hours, however, some forms may require a visit or additional information.    Type of letter, form or note:  medical - attending physicians report    Who is the form from?: Insurance comp    Where did the form come from: form was mailed in    What clinic location was the form placed at?: [place]    Where the form was placed: Dr Trent Box/Folder    What number is listed as a contact on the form?: [phone] ext [phone]       Additional comments: mailing envelope included    Call taken on 2/24/2020 at 11:39 AM by Neelima Sr

## 2020-03-05 ENCOUNTER — TELEPHONE (OUTPATIENT)
Dept: FAMILY MEDICINE | Facility: OTHER | Age: 40
End: 2020-03-05

## 2020-03-05 DIAGNOSIS — K64.4 EXTERNAL HEMORRHOIDS: Primary | ICD-10-CM

## 2020-03-05 NOTE — TELEPHONE ENCOUNTER
Reason for Call:  Same Day Appointment, Requested Provider:  Chance Trent DO    PCP: Chance Trent    Reason for visit: Hemorrhoids    Duration of symptoms: 3 days    Have you been treated for this in the past? Yes    Additional comments: Pt's fiance called stating pt is not getting relief from home remedies or otc medications. He is requesting to see Dr. Trent only, please call for possible work in today.    Can we leave a detailed message on this number? YES    Phone number patient can be reached at: Home number on file [phone] (home)    Best Time: Anytime    Call taken on 3/5/2020 at 7:32 AM by Emily Peterson

## 2020-03-05 NOTE — TELEPHONE ENCOUNTER
Patient has an appointment with a general surgeon on Monday in [place].  I encouraged them to call and let them know he is also struggling with this issue and see if they can allot enough time for him to address both issues.    Sena Sloan XRO/

## 2020-03-05 NOTE — TELEPHONE ENCOUNTER
I am afraid that we do not have any emergency hemorrhoid openings (allocated time slots) available today.      I have sent over prescription for some Anusol to be used.  If this does not bring about substantial improvement in combination with sitz baths and appropriate stool softeners, he should let me know and we can set up an appointment to see general surgery.  There are very few medications that are helpful in the treatment of hemorrhoidal disease.      Miley

## 2020-03-09 ENCOUNTER — OFFICE VISIT (OUTPATIENT)
Dept: SURGERY | Facility: CLINIC | Age: 40
End: 2020-03-09
Payer: COMMERCIAL

## 2020-03-09 VITALS
HEART RATE: 78 BPM | WEIGHT: 291.8 LBS | SYSTOLIC BLOOD PRESSURE: 124 MMHG | DIASTOLIC BLOOD PRESSURE: 80 MMHG | BODY MASS INDEX: 48.56 KG/M2

## 2020-03-09 DIAGNOSIS — K42.0 UMBILICAL HERNIA, INCARCERATED: Primary | ICD-10-CM

## 2020-03-09 PROCEDURE — 99214 OFFICE O/P EST MOD 30 MIN: CPT | Performed by: SURGERY

## 2020-03-09 NOTE — PROGRESS NOTES
HPI:  Waqas is a 39 year old male who presents for evaluation of periumbilical bulging.  The patient has recently had more discomfort in the area, though he has had discomfort there in the past as well.  He did have some redness of the abdominal wall, although some of this was away from the umbilicus.  The umbilical hernia has gotten much firmer to palpation recently, and it no longer reduces.  The patient does have diabetes and this is historically been relatively poorly controlled.  He had previously seen a surgeon elsewhere, who was not willing to fix his hernia due to his obesity and diabetes.  He now feels he has been having very significant symptoms, and would very much like to have this repaired.  He has also been struggling with hemorrhoids, though that is improving.        Past Medical History:   has a past medical history of Arthritis, Diabetes (H) (2013), Unspecified essential hypertension, Variants of migraine, not elsewhere classified, without mention of intractable migraine without mention of status migrainosus, and Varicella without mention of complication (1993).    Past Surgical History:  Past Surgical History:   Procedure Laterality Date     ARTHROSCOPY SHOULDER DECOMPRESSION Left 1/20/2016    Procedure: ARTHROSCOPY SHOULDER DECOMPRESSION;  Surgeon: Watson Duckworth MD;  Location: [facility]     ARTHROSCOPY SHOULDER DECOMPRESSION Right 2/1/2017    Procedure: ARTHROSCOPY SHOULDER DECOMPRESSION;  Surgeon: Watson Duckworth MD;  Location: [facility]     ARTHROSCOPY SHOULDER DISTAL CLAVICLE REPAIR Left 1/20/2016    Procedure: ARTHROSCOPY SHOULDER DISTAL CLAVICLE RESECTION;  Surgeon: Watson Duckworth MD;  Location: [facility]     ARTHROSCOPY SHOULDER DISTAL CLAVICLE REPAIR Right 2/1/2017    Procedure: ARTHROSCOPY SHOULDER DISTAL CLAVICLE RESECTION;  Surgeon: Watson Duckworth MD;  Location: [facility]     LAMINECT/DISCECTOMY, LUMBAR  04/24/2007    Left L3-L4 Hemilaminectomy/Microdiskectomy.  [facility]         Social History:  Social History     Socioeconomic History     Marital status: Single     Spouse name: Not on file     Number of children: Not on file     Years of education: Not on file     Highest education level: Not on file   Occupational History     Not on file   Social Needs     Financial resource strain: Not on file     Food insecurity     Worry: Not on file     Inability: Not on file     Transportation needs     Medical: Not on file     Non-medical: Not on file   Tobacco Use     Smoking status: Never Smoker     Smokeless tobacco: Never Used   Substance and Sexual Activity     Alcohol use: Yes     Alcohol/week: 1.0 - 2.0 standard drinks     Comment: 1 drink a day     Drug use: No     Sexual activity: Yes     Partners: Female     Birth control/protection: Female Surgical     Comment: Girlfriend had a hysterectomy   Lifestyle     Physical activity     Days per week: Not on file     Minutes per session: Not on file     Stress: Not on file   Relationships     Social connections     Talks on phone: Not on file     Gets together: Not on file     Attends Orthodoxy service: Not on file     Active member of club or organization: Not on file     Attends meetings of clubs or organizations: Not on file     Relationship status: Not on file     Intimate partner violence     Fear of current or ex partner: Not on file     Emotionally abused: Not on file     Physically abused: Not on file     Forced sexual activity: Not on file   Other Topics Concern     Parent/sibling w/ CABG, MI or angioplasty before 65F 55M? No   Social History Narrative     Not on file        Family History:  Family History   Problem Relation Age of Onset     Hypertension Brother      Cerebrovascular Disease Maternal Grandfather      Unknown/Adopted Father      Hypertension Father      Hypertension Brother          ROS:  The 10 point review of systems is negative other than noted in the HPI and above.    PE:    General- Well-developed,  well-nourished, patient able to get up on table without difficulty.  HEENT- Normocephalic and atraumatic. Pupils equal and round.  Mucous membranes moist.  Sclera are nonicteric.  Neck- No lymphadenopathy or masses   Respirations- are regular and non labored  Abdomen is abdomen is soft without significant tenderness, masses, organomegaly or guarding  Hernia-there is a moderate sized nonreducible umbilical hernia.  This is firm but not hard.  There is no obvious erythema of the skin today.  Skin-there are a few pustules on the patient's pannus.  There is no significant cellulitis at this time.               Assessment: Umbilical hernia, nonreducible.    Plan: This is a patient with worsening symptoms from his umbilical hernia.  In spite of the patient's obesity and diabetes, I think this probably needs to be fixed sooner rather than later, as it has become more problematic for him.  We have discussed observation, reduction techniques and importance, incarceration and strangulation signs, symptoms and importance as well as need to seek emergency treatment.    We have discussed surgery in detail, including risk, benefits, complications, mesh, infection, lifting and activity limits after surgery. He has been given literature to review. We will schedule surgery at patient's convenience.  I communicated the need to have his blood sugars under reasonable control on the day of surgery, and that if they were not, anesthesia might potentially cancel his procedure.        Gabo Cintron MD    Please route or send letter to:  Primary Care Provider (PCP)

## 2020-03-09 NOTE — LETTER
2020       Re: Waqas [facility] Isaima - 1980    Waqas is a 39 year old male who presents for evaluation of periumbilical bulging.  The patient has recently had more discomfort in the area, though he has had discomfort there in the past as well.  He did have some redness of the abdominal wall, although some of this was away from the umbilicus. The umbilical hernia has gotten much firmer to palpation recently, and it no longer reduces.  The patient does have diabetes and this is historically been relatively poorly controlled.  He had previously seen a surgeon elsewhere, who was not willing to fix his hernia due to his obesity and diabetes.  He now feels he has been having very significant symptoms, and would very much like to have this repaired.  He has also been struggling with hemorrhoids, though that is improving.       Past Medical History:  has a past medical history of Arthritis, Diabetes (H) (), Unspecified essential hypertension, Variants of migraine, not elsewhere classified, without mention of intractable migraine without mention of status migrainosus, and Varicella without mention of complication ().     ROS:  The 10 point review of systems is negative other than noted in the HPI and above.     PE:    General- Well-developed, well-nourished, patient able to get up on table without difficulty.  HEENT- Normocephalic and atraumatic. Pupils equal and round.  Mucous membranes moist.  Sclera are nonicteric.  Neck- No lymphadenopathy or masses   Respirations- are regular and non labored  Abdomen is abdomen is soft without significant tenderness, masses, organomegaly or guarding  Hernia-there is a moderate sized nonreducible umbilical hernia.  This is firm but not hard.  There is no obvious erythema of the skin today.  Skin-there are a few pustules on the patient's pannus.  There is no significant cellulitis at this time.               Assessment: Umbilical hernia, nonreducible.     Plan: This  is a patient with worsening symptoms from his umbilical hernia.  In spite of the patient's obesity and diabetes, I think this probably needs to be fixed sooner rather than later, as it has become more problematic for him.  We have discussed observation, reduction techniques and importance, incarceration and strangulation signs, symptoms and importance as well as need to seek emergency treatment.    We have discussed surgery in detail, including risk, benefits, complications, mesh, infection, lifting and activity limits after surgery. He has been given literature to review. We will schedule surgery at patient's convenience.  I communicated the need to have his blood sugars under reasonable control on the day of surgery, and that if they were not, anesthesia might potentially cancel his procedure.           Gabo Cintron MD

## 2020-03-10 ENCOUNTER — PREP FOR PROCEDURE (OUTPATIENT)
Dept: SURGERY | Facility: CLINIC | Age: 40
End: 2020-03-10

## 2020-03-10 DIAGNOSIS — K42.9 UMBILICAL HERNIA WITHOUT OBSTRUCTION AND WITHOUT GANGRENE: Primary | ICD-10-CM

## 2020-03-11 ENCOUNTER — HOSPITAL ENCOUNTER (OUTPATIENT)
Facility: CLINIC | Age: 40
End: 2020-03-11
Attending: SURGERY | Admitting: SURGERY
Payer: COMMERCIAL

## 2020-03-11 ENCOUNTER — TELEPHONE (OUTPATIENT)
Dept: SURGERY | Facility: CLINIC | Age: 40
End: 2020-03-11

## 2020-03-11 DIAGNOSIS — K42.9 UMBILICAL HERNIA WITHOUT OBSTRUCTION AND WITHOUT GANGRENE: ICD-10-CM

## 2020-03-11 NOTE — TELEPHONE ENCOUNTER
Canceled  Type of surgery: UMBILICAL HERNIA REPAIR WITH MESH   Location of surgery: [facility]  Date and time of surgery: 3-26-20, 9 AM   Surgeon: DR. SHETH   Pre-Op Appt Date: PATIENT TO SCHEDULE   Post-Op Appt Date: PATIENT TO SCHEDULE    Packet sent out: Yes  Pre-cert/Authorization completed:  Not Applicable  Date: 3-11-20        UMBILICAL HERNIA REPAIR WITH MESH   GENERAL   PT INST TO HAVE H&P WITH DR. GR  90 MINS REQ   PA ASSIST [facility]   ALW

## 2020-03-12 RX ORDER — CEFAZOLIN SODIUM IN 0.9 % NACL 3 G/100 ML
3 INTRAVENOUS SOLUTION, PIGGYBACK (ML) INTRAVENOUS
Status: CANCELLED | OUTPATIENT
Start: 2020-03-12

## 2020-03-12 RX ORDER — CEFAZOLIN SODIUM 1 G/3ML
1 INJECTION, POWDER, FOR SOLUTION INTRAMUSCULAR; INTRAVENOUS SEE ADMIN INSTRUCTIONS
Status: CANCELLED | OUTPATIENT
Start: 2020-03-12

## 2020-03-13 ENCOUNTER — OFFICE VISIT (OUTPATIENT)
Dept: FAMILY MEDICINE | Facility: OTHER | Age: 40
End: 2020-03-13
Payer: COMMERCIAL

## 2020-03-13 VITALS
RESPIRATION RATE: 20 BRPM | OXYGEN SATURATION: 99 % | DIASTOLIC BLOOD PRESSURE: 82 MMHG | HEART RATE: 90 BPM | HEIGHT: 65 IN | TEMPERATURE: 97.7 F | BODY MASS INDEX: 48.15 KG/M2 | SYSTOLIC BLOOD PRESSURE: 130 MMHG | WEIGHT: 289 LBS

## 2020-03-13 DIAGNOSIS — Z01.818 PREOP GENERAL PHYSICAL EXAM: Primary | ICD-10-CM

## 2020-03-13 DIAGNOSIS — E11.69 TYPE 2 DIABETES MELLITUS WITH OTHER SPECIFIED COMPLICATION, WITHOUT LONG-TERM CURRENT USE OF INSULIN (H): ICD-10-CM

## 2020-03-13 DIAGNOSIS — K42.9 UMBILICAL HERNIA WITHOUT OBSTRUCTION AND WITHOUT GANGRENE: ICD-10-CM

## 2020-03-13 DIAGNOSIS — E78.5 HYPERLIPIDEMIA LDL GOAL <100: ICD-10-CM

## 2020-03-13 DIAGNOSIS — E66.01 MORBID OBESITY (H): ICD-10-CM

## 2020-03-13 LAB
ALBUMIN SERPL-MCNC: 3.6 G/DL (ref 3.4–5)
ALBUMIN UR-MCNC: NEGATIVE MG/DL
ALP SERPL-CCNC: 88 U/L (ref 40–150)
ALT SERPL W P-5'-P-CCNC: 26 U/L (ref 0–70)
ANION GAP SERPL CALCULATED.3IONS-SCNC: 7 MMOL/L (ref 3–14)
APPEARANCE UR: CLEAR
AST SERPL W P-5'-P-CCNC: 14 U/L (ref 0–45)
BILIRUB SERPL-MCNC: 0.3 MG/DL (ref 0.2–1.3)
BILIRUB UR QL STRIP: NEGATIVE
BUN SERPL-MCNC: 15 MG/DL (ref 7–30)
CALCIUM SERPL-MCNC: 8.4 MG/DL (ref 8.5–10.1)
CHLORIDE SERPL-SCNC: 104 MMOL/L (ref 94–109)
CO2 SERPL-SCNC: 27 MMOL/L (ref 20–32)
COLOR UR AUTO: YELLOW
CREAT SERPL-MCNC: 0.95 MG/DL (ref 0.66–1.25)
ERYTHROCYTE [DISTWIDTH] IN BLOOD BY AUTOMATED COUNT: 12.5 % (ref 10–15)
GFR SERPL CREATININE-BSD FRML MDRD: >90 ML/MIN/{1.73_M2}
GLUCOSE SERPL-MCNC: 162 MG/DL (ref 70–99)
GLUCOSE UR STRIP-MCNC: 500 MG/DL
HBA1C MFR BLD: 9.9 % (ref 0–5.6)
HCT VFR BLD AUTO: 48.8 % (ref 40–53)
HGB BLD-MCNC: 16.2 G/DL (ref 13.3–17.7)
HGB UR QL STRIP: NEGATIVE
KETONES UR STRIP-MCNC: 15 MG/DL
LEUKOCYTE ESTERASE UR QL STRIP: NEGATIVE
MCH RBC QN AUTO: 30 PG (ref 26.5–33)
MCHC RBC AUTO-ENTMCNC: 33.2 G/DL (ref 31.5–36.5)
MCV RBC AUTO: 90 FL (ref 78–100)
NITRATE UR QL: NEGATIVE
PH UR STRIP: 5.5 PH (ref 5–7)
PLATELET # BLD AUTO: 263 10E9/L (ref 150–450)
POTASSIUM SERPL-SCNC: 3.7 MMOL/L (ref 3.4–5.3)
PROT SERPL-MCNC: 7.2 G/DL (ref 6.8–8.8)
RBC # BLD AUTO: 5.4 10E12/L (ref 4.4–5.9)
SODIUM SERPL-SCNC: 138 MMOL/L (ref 133–144)
SOURCE: ABNORMAL
SP GR UR STRIP: 1.02 (ref 1–1.03)
UROBILINOGEN UR STRIP-ACNC: 0.2 EU/DL (ref 0.2–1)
WBC # BLD AUTO: 9.1 10E9/L (ref 4–11)

## 2020-03-13 PROCEDURE — 83036 HEMOGLOBIN GLYCOSYLATED A1C: CPT | Performed by: INTERNAL MEDICINE

## 2020-03-13 PROCEDURE — 81003 URINALYSIS AUTO W/O SCOPE: CPT | Performed by: INTERNAL MEDICINE

## 2020-03-13 PROCEDURE — 80053 COMPREHEN METABOLIC PANEL: CPT | Performed by: INTERNAL MEDICINE

## 2020-03-13 PROCEDURE — 99214 OFFICE O/P EST MOD 30 MIN: CPT | Performed by: INTERNAL MEDICINE

## 2020-03-13 PROCEDURE — 93000 ELECTROCARDIOGRAM COMPLETE: CPT | Performed by: INTERNAL MEDICINE

## 2020-03-13 PROCEDURE — 85027 COMPLETE CBC AUTOMATED: CPT | Performed by: INTERNAL MEDICINE

## 2020-03-13 PROCEDURE — 36415 COLL VENOUS BLD VENIPUNCTURE: CPT | Performed by: INTERNAL MEDICINE

## 2020-03-13 ASSESSMENT — MIFFLIN-ST. JEOR: SCORE: 2157.78

## 2020-03-13 ASSESSMENT — PAIN SCALES - GENERAL: PAINLEVEL: MODERATE PAIN (5)
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
## 2020-03-14 NOTE — RESULT ENCOUNTER NOTE
Dear Waqas, your recent test results are attached.  The urine sample demonstrates the presence of blood sugar but no infection.  Blood sugar is tested was 162 and the kidney function is normal.  Liver tests are normal.  Hemoglobin A1c has come down from the 12.2 only 1 month ago down to 9.9 which is remarkable.    Nice job!    The blood cell count is normal and shows no evidence of anemia or leukemia.  You will be contacted with any outstanding results when they are available.  Feel free to contact me via the office or My Chart if you have any questions regarding the above.  Sincerely,  Chance Trent,  FACOI

## 2020-03-15 ENCOUNTER — MYC MEDICAL ADVICE (OUTPATIENT)
Dept: FAMILY MEDICINE | Facility: OTHER | Age: 40
End: 2020-03-15

## 2020-03-15 DIAGNOSIS — S16.1XXA ACUTE STRAIN OF NECK MUSCLE, INITIAL ENCOUNTER: ICD-10-CM

## 2020-03-16 NOTE — TELEPHONE ENCOUNTER
Patient called back. Unable to reach nurse. Please call him back when able.  Thank you,  Jeanette Paniagua   for [facility]

## 2020-03-16 NOTE — TELEPHONE ENCOUNTER
Requested Prescriptions   Pending Prescriptions Disp Refills     tiZANidine (ZANAFLEX) 4 MG tablet [Pharmacy Med Name: TIZANIDINE 4MG TABLET] 30 tablet 0     Sig: TAKE 1 TABLET BY MOUTH DAILY AT BEDTIME        Last Written Prescription Date:  02/12/2020  Last Fill Quantity: 30,   # refills: 0  Last Office Visit: 03/13/2020  Future Office visit:       Routing refill request to provider for review/approval because:  Drug not on the G, P or [facility] refill protocol or controlled substance    Routed to pcp to sign.     Adrienne Briscoe MA

## 2020-03-16 NOTE — TELEPHONE ENCOUNTER
Spoke with patient.  Recommend sitz bath's.  We will continue to use the Anusol as needed.    He is still scheduled for his surgery as best he knows.    Miley

## 2020-03-16 NOTE — TELEPHONE ENCOUNTER
Attempt to contact the patient on cell phone and at home were unsuccessful.  We will try later.      Miley

## 2020-03-23 ENCOUNTER — MYC MEDICAL ADVICE (OUTPATIENT)
Dept: FAMILY MEDICINE | Facility: OTHER | Age: 40
End: 2020-03-23

## 2020-03-24 ENCOUNTER — OFFICE VISIT (OUTPATIENT)
Dept: FAMILY MEDICINE | Facility: OTHER | Age: 40
End: 2020-03-24
Payer: COMMERCIAL

## 2020-03-24 VITALS
WEIGHT: 287.2 LBS | SYSTOLIC BLOOD PRESSURE: 120 MMHG | HEART RATE: 95 BPM | BODY MASS INDEX: 47.79 KG/M2 | DIASTOLIC BLOOD PRESSURE: 76 MMHG | RESPIRATION RATE: 20 BRPM | TEMPERATURE: 97.4 F | OXYGEN SATURATION: 99 %

## 2020-03-24 DIAGNOSIS — K64.5 HEMORRHOIDS, THROMBOSED: Primary | ICD-10-CM

## 2020-03-24 PROCEDURE — 99213 OFFICE O/P EST LOW 20 MIN: CPT | Performed by: INTERNAL MEDICINE

## 2020-03-24 RX ORDER — AMOXICILLIN AND CLAVULANATE POTASSIUM 500; 125 MG/1; MG/1
1 TABLET, FILM COATED ORAL 2 TIMES DAILY
Qty: 14 TABLET | Refills: 0 | Status: SHIPPED | OUTPATIENT
Start: 2020-03-24 | End: 2020-03-31

## 2020-03-24 NOTE — PROGRESS NOTES
"Subjective     Waqas Allen is a 39 year old male who presents to clinic today for the following health issues:    HPI   Concern - Hemorrhoids  Onset: 2 weeks    Description:       Intensity: moderate    Progression of Symptoms:  same    Accompanying Signs & Symptoms:  Bleeding with bowel movements    Previous history of similar problem:   yes    Precipitating factors:   Worsened by: none    Alleviating factors:  Improved by: hydrocortisone cream helps a little    Therapies Tried and outcome:   {additonal problems for provider to add                       Chief Complaint         The patient is a pleasant 39-year-old gentleman who presents today with some anal discomfort.  He notes he has had some blood with his bowel movements and describes the blood as primarily on the outside of the stool and sometimes specific drips into the toilet are noted.  He is not had any significant constipation or diarrhea.  He has been using some Anusol with limited results.  He notes that it is becoming slightly more painful today.  He is concerned that there might be something else going on.  He notes that recently, he tried to \"squeeze it\" and some pus came out.  He denies any fever or chills.  No signs of any ascending infection or tracts are related.                         PAST, FAMILY,SOCIAL HISTORY:     Medical  History:   has a past medical history of Arthritis, Diabetes (H) (2013), Unspecified essential hypertension, Variants of migraine, not elsewhere classified, without mention of intractable migraine without mention of status migrainosus, and Varicella without mention of complication (1993).     Surgical History:   has a past surgical history that includes laminect/discectomy, lumbar (04/24/2007); Arthroscopy shoulder decompression (Left, 1/20/2016); Arthroscopy shoulder distal clavicle repair (Left, 1/20/2016); Arthroscopy shoulder distal clavicle repair (Right, 2/1/2017); and Arthroscopy shoulder decompression (Right, " 2/1/2017).     Social History:   reports that he has never smoked. He has never used smokeless tobacco. He reports current alcohol use of about 1.0 - 2.0 standard drinks of alcohol per week. He reports that he does not use drugs.     Family History:  family history includes Cerebrovascular Disease in his maternal grandfather; Hypertension in his brother, brother, and father; Unknown/Adopted in his father.            MEDICATIONS  Current Outpatient Medications   Medication Sig Dispense Refill     amoxicillin-clavulanate (AUGMENTIN) 500-125 MG tablet Take 1 tablet by mouth 2 times daily 14 tablet 0     ASPIRIN 81 MG OR TABS ONE DAILY 100 3     blood glucose monitoring (NO BRAND SPECIFIED) test strip Use to test blood sugars 1 times daily or as directed 100 strip 3     FARXIGA 5 MG TABS tablet TAKE 1 TABLET (5MG) BY MOUTH EVERY DAY 30 tablet 1     glipiZIDE (GLUCOTROL) 10 MG tablet TAKE 2 TABLETS (20MG) BY MOUTH EVERY A.M. AND 1 TABLET (10MG) EVERYEVENING 270 tablet 0     hydrocortisone (ANUSOL-HC) 2.5 % cream Place rectally 2 times daily as needed for hemorrhoids 30 g 0     losartan (COZAAR) 100 MG tablet TAKE 1 TABLET BY MOUTH EVERY DAY 90 tablet 1     MELATONIN PO        Meloxicam 10 MG CAPS Take 1 capsule by mouth daily 90 capsule 0     methocarbamol (ROBAXIN) 750 MG tablet TAKE 1 TABLET (750 MG) BY MOUTH 2 TIMES DAILY AS NEEDED FOR MUSCLE SPASMS 60 tablet 0     MICROLET LANCETS MISC Use to test blood sugars 1 time daily or as directed 100 each 3     minocycline (MINOCIN/DYNACIN) 100 MG capsule TAKE 1 CAPSULE BY MOUTH TWICE A DAY. 60 capsule 11     POTASSIUM GLUCONATE PO Take 1 tablet by mouth daily       sildenafil (VIAGRA) 50 MG tablet Take 1 tablet (50 mg) by mouth daily as needed 30 min to 4 hrs before sex. Do not use with nitroglycerin, terazosin or doxazosin. 12 tablet 11     tiZANidine (ZANAFLEX) 4 MG tablet TAKE 1 TABLET BY MOUTH DAILY AT BEDTIME 30 tablet 0     zinc 50 MG TABS Take 1 tablet by mouth daily            --------------------------------------------------------------------------------------------------------------------                              Review of Systems     LUNGS: Pt denies: cough, excess sputum, hemoptysis, or shortness of breath.   HEART: Pt denies: chest pain, arrhythmia, syncope, tachy or bradyarrhythmia.   GI: Pt denies: nausea, vomiting, diarrhea, constipation, melena, or hematochezia.   NEURO: Pt denies: seizures, strokes, diplopia, weakness, paraesthesias, or paralysis.   PSYCH: The patient denies significant depression, anxiety, mood imbalance. Specifically denies any suicidal ideation.      Examination       /76   Pulse 95   Temp 97.4  F (36.3  C) (Temporal)   Resp 20   Wt 130.3 kg (287 lb 3.2 oz)   SpO2 99%   BMI 47.79 kg/m      LUNGS: clear bilaterally, airflow is brisk, no intercostal retraction or stridor is noted. No coughing is noted during visit.   HEART:  regular without rubs, clicks, gallops, or murmurs. PMI is nondisplaced. Upstrokes are brisk. S1,S2 are heard.   GI: Abdomen is soft, without rebound, guarding or tenderness. Bowel sounds are appropriate. No renal bruits are heard.  Rectal examination demonstrates an isolated minimally thrombosed hemorrhoid.  It is slightly reddened and does not appear to be abscessed.  No signs of pilonidal cyst or abscess noted.         Decision Making       1. Hemorrhoids, thrombosed  Recommend sitz bath's, continue with the Anusol, and will start Augmentin as there is increased inflammation and signs of early infection.    - amoxicillin-clavulanate (AUGMENTIN) 500-125 MG tablet; Take 1 tablet by mouth 2 times daily  Dispense: 14 tablet; Refill: 0                                 FOLLOW UP   I have asked the patient to make an appointment for followup with me in 1 week via telephone            I have carefully explained the diagnosis and treatment options to the patient.  The patient has displayed an understanding of the above,  and all subsequent questions were answered.      DO ROSE MARY Miller    Portions of this note were produced using [employer]  Although every attempt at real-time proof reading has been made, occasional grammar/syntax errors may have been missed.

## 2020-03-31 DIAGNOSIS — K64.5 HEMORRHOIDS, THROMBOSED: ICD-10-CM

## 2020-03-31 RX ORDER — AMOXICILLIN AND CLAVULANATE POTASSIUM 500; 125 MG/1; MG/1
1 TABLET, FILM COATED ORAL 2 TIMES DAILY
Qty: 14 TABLET | Refills: 0 | Status: SHIPPED | OUTPATIENT
Start: 2020-03-31 | End: 2021-01-15

## 2020-04-02 DIAGNOSIS — E11.69 TYPE 2 DIABETES MELLITUS WITH OTHER SPECIFIED COMPLICATION, WITHOUT LONG-TERM CURRENT USE OF INSULIN (H): ICD-10-CM

## 2020-04-02 RX ORDER — DAPAGLIFLOZIN 5 MG/1
TABLET, FILM COATED ORAL
Qty: 30 TABLET | Refills: 1 | Status: SHIPPED | OUTPATIENT
Start: 2020-04-02 | End: 2020-06-01

## 2020-04-02 NOTE — TELEPHONE ENCOUNTER
"Requested Prescriptions   Pending Prescriptions Disp Refills     FARXIGA 5 MG TABS tablet [Pharmacy Med Name: dapagliflozin (FARXIGA) 5 MG TABS tablet] 30 tablet 1     Sig: TAKE 1 TABLET (5MG) BY MOUTH EVERY DAY       Sodium Glucose Co-Transport Inhibitor Agents Passed - 4/2/2020  1:05 AM        Passed - Patient has documented A1c within the specified period of time.     If HgbA1C is 8 or greater, it needs to be on file within the past 3 months.  If less than 8, must be on file within the past 6 months.     Recent Labs   Lab Test 03/13/20  1344   A1C 9.9*             Passed - No creatinine >1.4 or GFR <45 within the past 12 mos     Recent Labs   Lab Test 03/13/20  1344   GFRESTIMATED >90   GFRESTBLACK >90       Recent Labs   Lab Test 03/13/20  1344   CR 0.95             Passed - Medication is active on med list        Passed - Patient is age 18 or older        Passed - Patient has documented normal Potassium within the last 12 mos.     Recent Labs   Lab Test 03/13/20  1344   POTASSIUM 3.7             Passed - Recent (6 mo) or future (30 days) visit within the authorizing provider's specialty     Patient had office visit in the last 6 months or has a visit in the next 30 days with authorizing provider or within the authorizing provider's specialty.  See \"Patient Info\" tab in inbasket, or \"Choose Columns\" in Meds & Orders section of the refill encounter.                 "

## 2020-04-04 DIAGNOSIS — E11.69 TYPE 2 DIABETES MELLITUS WITH OTHER SPECIFIED COMPLICATION, WITHOUT LONG-TERM CURRENT USE OF INSULIN (H): ICD-10-CM

## 2020-04-06 DIAGNOSIS — S16.1XXA ACUTE STRAIN OF NECK MUSCLE, INITIAL ENCOUNTER: ICD-10-CM

## 2020-04-06 RX ORDER — GLIPIZIDE 10 MG/1
TABLET ORAL
Qty: 270 TABLET | Refills: 0 | Status: SHIPPED | OUTPATIENT
Start: 2020-04-06 | End: 2020-07-01

## 2020-04-06 NOTE — TELEPHONE ENCOUNTER
Prescription approved per [facility] Refill Protocol.    MARCIO WalshN, RN  [facility]

## 2020-04-06 NOTE — TELEPHONE ENCOUNTER
"Requested Prescriptions   Pending Prescriptions Disp Refills     glipiZIDE (GLUCOTROL) 10 MG tablet [Pharmacy Med Name: GLIPIZIDE 10MG TABLET] 270 tablet 0     Sig: TAKE 2 TABLETS (20MG) BY MOUTH EVERY A.M. AND 1 TABLET (10MG) EVERYEVENING   Last Written Prescription Date:  12/17/19  Last Fill Quantity: 270,  # refills: 0   Last office visit: 3/24/2020 with prescribing provider:  3/24/2020   Future Office Visit:        Sulfonylurea Agents Passed - 4/4/2020  1:02 AM        Passed - Patient has documented A1c within the specified period of time.     If HgbA1C is 8 or greater, it needs to be on file within the past 3 months.  If less than 8, must be on file within the past 6 months.     Recent Labs   Lab Test 03/13/20  1344   A1C 9.9*             Passed - Medication is active on med list        Passed - Patient is age 18 or older        Passed - Patient has a recent creatinine (normal) within the past 12 mos.     Recent Labs   Lab Test 03/13/20  1344   CR 0.95       Ok to refill medication if creatinine is low          Passed - Recent (6 mo) or future (30 days) visit within the authorizing provider's specialty     Patient had office visit in the last 6 months or has a visit in the next 30 days with authorizing provider or within the authorizing provider's specialty.  See \"Patient Info\" tab in inbasket, or \"Choose Columns\" in Meds & Orders section of the refill encounter.               "

## 2020-04-06 NOTE — TELEPHONE ENCOUNTER
Requested Prescriptions   Pending Prescriptions Disp Refills     tiZANidine (ZANAFLEX) 4 MG tablet [Pharmacy Med Name: TIZANIDINE 4MG TABLET] 30 tablet 0     Sig: TAKE 1 TABLET BY MOUTH DAILY AT BEDTIME     Last Written Prescription Date:  03/16/2020  Last Fill Quantity: 30,   # refills: 0  Last Office Visit: 03/24/2020  Future Office visit:       Routing refill request to provider for review/approval because:  Drug not on the [facility], [facility] or [facility] refill protocol or controlled substance.     Routing to covering provider to sign. Dr. Trent not in office until next week.       Adrienne Briscoe MA

## 2020-04-07 ENCOUNTER — MYC MEDICAL ADVICE (OUTPATIENT)
Dept: FAMILY MEDICINE | Facility: OTHER | Age: 40
End: 2020-04-07

## 2020-04-13 DIAGNOSIS — S39.012A BACK STRAIN, INITIAL ENCOUNTER: ICD-10-CM

## 2020-04-13 NOTE — TELEPHONE ENCOUNTER
Methocarbamol  Last Written Prescription Date:  02/06/2020  Last Fill Quantity: 60,  # refills: 0   Last office visit: 3/24/2020 with prescribing provider:  Miley Leong Office Visit:  None    Routing refill request to provider for review/approval because:  Drug not on the [facility] refill protocol     Yola Sanchez RN

## 2020-04-14 RX ORDER — METHOCARBAMOL 750 MG/1
750 TABLET, FILM COATED ORAL 2 TIMES DAILY PRN
Qty: 60 TABLET | Refills: 0 | Status: SHIPPED | OUTPATIENT
Start: 2020-04-14 | End: 2020-05-27

## 2020-04-16 ENCOUNTER — NURSE TRIAGE (OUTPATIENT)
Dept: FAMILY MEDICINE | Facility: OTHER | Age: 40
End: 2020-04-16

## 2020-04-16 NOTE — TELEPHONE ENCOUNTER
"Patient states it started as pain when swallowing, then increased to pain when burping and now he has pain when taking in a deep breath.  Pain gets to 8/10 when doing one of these activities, but is a dull ache when not.  Patient has an appointment at 3:00 today, 4/16/2020 with Dr. Valle.  Patient has no COVID symptoms    Additional Information    Negative: [1] Severe difficulty swallowing (e.g., drooling or spitting) AND [2] started suddenly after taking a medicine or allergic food    Negative: Wheezing, stridor, hoarseness, or difficulty breathing    Negative: [1] Swollen tongue AND [2] sudden onset    Negative: Sounds like a life-threatening emergency to the triager    Negative: Mouth ulcers are seen    Negative: Sore throat (throat pain with swallowing)    Negative: Swallowed a (non-edible) foreign body    Negative: Feeding tube, questions or concerns related to    Negative: Swelling of tongue    Negative: [1] Symptoms of blocked esophagus (e.g., can't swallow normal secretions, drooling) AND [2] present now    Negative: Symptoms of food or bone stuck in throat or esophagus (e.g., pain in throat or chest, FB sensation, blood-tinged saliva)    Negative: [1] Severe difficulty swallowing (e.g., drooling or spitting, can't swallow water) AND [2] new onset AND [3] normal breathing    Negative: SEVERE symptoms of pill stuck in throat or esophagus (e.g., severe pain, bleeding, or inability to swallow liquids)    Negative: [1] Drinking very little AND [2] dehydration suspected (e.g., no urine > 12 hours, very dry mouth, very lightheaded)    Negative: [1] Refuses to drink anything AND [2] for > 12 hours    Negative: Patient sounds very sick or weak to the triager    Negative: [1] Coughing spells AND [2] occur during eating/feedings or within 2 hours    Answer Assessment - Initial Assessment Questions  1. SYMPTOM: \"Are you having difficulty swallowing liquids, solids, or both?\"      No, just pain with swallowing  2. " "ONSET: \"When did the swallowing problems begin?\"       2 days ago  3. CAUSE: \"What do you think is causing the problem?\"       unknown    5. OTHER SYMPTOMS: \"Do you have any other symptoms?\" (e.g., difficulty breathing, sore throat, swollen tongue, chest pain)      Hurts with burping and deep breaths    Protocols used: SWALLOWING DIFFICULTY-A-AH      "

## 2020-04-24 ENCOUNTER — TRANSFERRED RECORDS (OUTPATIENT)
Dept: HEALTH INFORMATION MANAGEMENT | Facility: CLINIC | Age: 40
End: 2020-04-24

## 2020-04-24 LAB
AST SERPL-CCNC: 13 U/L (ref 5–34)
CREAT SERPL-MCNC: 1.07 MG/DL (ref 0.5–1.3)
GFR SERPL CREATININE-BSD FRML MDRD: 81.8 ML/MIN/1.73M2

## 2020-05-05 ENCOUNTER — TELEPHONE (OUTPATIENT)
Dept: SURGERY | Facility: CLINIC | Age: 40
End: 2020-05-05

## 2020-05-05 ENCOUNTER — HOSPITAL ENCOUNTER (OUTPATIENT)
Facility: CLINIC | Age: 40
End: 2020-05-05
Attending: SURGERY | Admitting: SURGERY
Payer: COMMERCIAL

## 2020-05-05 DIAGNOSIS — Z11.59 ENCOUNTER FOR SCREENING FOR OTHER VIRAL DISEASES: Primary | ICD-10-CM

## 2020-05-05 NOTE — TELEPHONE ENCOUNTER
----CANCELLED---- 5/13/2020     Type of surgery: UMBILICAL HERNIA REPAIR WITH MESH   Location of surgery: [facility]  Date and time of surgery: 5-19-20, 7:30 AM   Surgeon: DR. SHETH   Pre-Op Appt Date: PATIENT TO SCHEDULE   Post-Op Appt Date: PATIENT TO SCHEDULE    Packet sent out: Yes  Pre-cert/Authorization completed:  Not Applicable  Date: 5-5-20    UMBILICAL HERNIA REPAIR WITH MESH   GENERAL   PT INST TO HAVE H&P WITH DR. GR  90 MINS REQ   PA ASSIST DFB   ALW

## 2020-05-06 DIAGNOSIS — S16.1XXA ACUTE STRAIN OF NECK MUSCLE, INITIAL ENCOUNTER: ICD-10-CM

## 2020-05-06 RX ORDER — CEFAZOLIN SODIUM 1 G/3ML
1 INJECTION, POWDER, FOR SOLUTION INTRAMUSCULAR; INTRAVENOUS SEE ADMIN INSTRUCTIONS
Status: CANCELLED | OUTPATIENT
Start: 2020-05-06

## 2020-05-06 RX ORDER — CEFAZOLIN SODIUM IN 0.9 % NACL 3 G/100 ML
3 INTRAVENOUS SOLUTION, PIGGYBACK (ML) INTRAVENOUS
Status: CANCELLED | OUTPATIENT
Start: 2020-05-06

## 2020-05-06 NOTE — TELEPHONE ENCOUNTER
Tizanidine        Last Written Prescription Date:  4/7/2020  Last Fill Quantity: 30,   # refills: 0  Last Office Visit: 3/24/2020  Future Office visit:    Next 5 appointments (look out 90 days)    May 15, 2020 10:00 AM CDT  Nurse Only with Ph [facility] [address]  [address] [address]  [phone]   May 15, 2020 11:00 AM CDT  Pre-Op physical with K[facility]ma CNP  [facility] [address] [place] [id number]  [phone]           Routing refill request to provider for review/approval because:  Drug not on the [facility], [facility] or [facility] refill protocol or controlled substance

## 2020-05-13 ENCOUNTER — TELEPHONE (OUTPATIENT)
Dept: SURGERY | Facility: CLINIC | Age: 40
End: 2020-05-13

## 2020-05-13 NOTE — TELEPHONE ENCOUNTER
"Patient called to cancel surgery 5/13/2020 @ 8:31 AM  I was on the phone with him for 2 minutes and 14 seconds of him swearing at me that his girlfriend can not be in the hospital waiting for him due to the COVID restrictions. That \"it is going to be 80 fricken degrees and his girlfriend is not comfortable waiting in the cities alone.\" Patient was Very Rude and upset about the Covid restrictions. Surgery is cancelled patient will call back to reschedule  Sofya Rubalcava [facility]    "

## 2020-05-27 DIAGNOSIS — S39.012A BACK STRAIN, INITIAL ENCOUNTER: ICD-10-CM

## 2020-05-27 RX ORDER — METHOCARBAMOL 750 MG/1
750 TABLET, FILM COATED ORAL 2 TIMES DAILY PRN
Qty: 60 TABLET | Refills: 0 | Status: SHIPPED | OUTPATIENT
Start: 2020-05-27 | End: 2020-07-17

## 2020-05-27 NOTE — TELEPHONE ENCOUNTER
Robaxin      Last Written Prescription Date:  4/14/2020  Last Fill Quantity: 60,   # refills: 0  Last Office Visit: 3/24/2020  Future Office visit:       Routing refill request to provider for review/approval because:  Drug not on the [facility], [facility] or [facility] refill protocol or controlled substance

## 2020-06-01 DIAGNOSIS — E11.69 TYPE 2 DIABETES MELLITUS WITH OTHER SPECIFIED COMPLICATION, WITHOUT LONG-TERM CURRENT USE OF INSULIN (H): ICD-10-CM

## 2020-06-01 RX ORDER — DAPAGLIFLOZIN 5 MG/1
TABLET, FILM COATED ORAL
Qty: 30 TABLET | Refills: 1 | Status: SHIPPED | OUTPATIENT
Start: 2020-06-01 | End: 2020-07-31

## 2020-06-01 NOTE — TELEPHONE ENCOUNTER
Prescription approved per [facility] Refill Protocol.    MARCIO WalshN, RN  [facility]

## 2020-06-05 ENCOUNTER — TRANSFERRED RECORDS (OUTPATIENT)
Dept: HEALTH INFORMATION MANAGEMENT | Facility: CLINIC | Age: 40
End: 2020-06-05

## 2020-06-05 DIAGNOSIS — S16.1XXA ACUTE STRAIN OF NECK MUSCLE, INITIAL ENCOUNTER: ICD-10-CM

## 2020-06-05 NOTE — TELEPHONE ENCOUNTER
Requested Prescriptions   Pending Prescriptions Disp Refills     tiZANidine (ZANAFLEX) 4 MG tablet [Pharmacy Med Name: TIZANIDINE 4MG TABLET] 30 tablet 0     Sig: TAKE 1 TABLET BY MOUTH DAILY AT BEDTIME     Last Written Prescription Date:  05/06/2020  Last Fill Quantity: 30,   # refills: 0  Last Office Visit: 03/24/2020  Future Office visit:       Routing refill request to provider for review/approval because:  Drug not on the [facility], [facility] or [facility] refill protocol or controlled substance    Adrienne Briscoe MA

## 2020-07-01 ENCOUNTER — HOSPITAL ENCOUNTER (OUTPATIENT)
Dept: NUCLEAR MEDICINE | Facility: CLINIC | Age: 40
Setting detail: NUCLEAR MEDICINE
End: 2020-07-01
Attending: INTERNAL MEDICINE
Payer: COMMERCIAL

## 2020-07-01 DIAGNOSIS — E11.69 TYPE 2 DIABETES MELLITUS WITH OTHER SPECIFIED COMPLICATION, WITHOUT LONG-TERM CURRENT USE OF INSULIN (H): ICD-10-CM

## 2020-07-01 DIAGNOSIS — M15.0 PRIMARY OSTEOARTHRITIS INVOLVING MULTIPLE JOINTS: ICD-10-CM

## 2020-07-01 DIAGNOSIS — M25.50 PAIN IN JOINT, MULTIPLE SITES: ICD-10-CM

## 2020-07-01 PROCEDURE — A9561 TC99M OXIDRONATE: HCPCS | Performed by: INTERNAL MEDICINE

## 2020-07-01 PROCEDURE — 78306 BONE IMAGING WHOLE BODY: CPT

## 2020-07-01 PROCEDURE — 34300033 ZZH RX 343: Performed by: INTERNAL MEDICINE

## 2020-07-01 RX ORDER — GLIPIZIDE 10 MG/1
TABLET ORAL
Qty: 270 TABLET | Refills: 0 | Status: SHIPPED | OUTPATIENT
Start: 2020-07-01 | End: 2020-08-31

## 2020-07-01 RX ADMIN — Medication 25.3 MILLICURIE: at 09:00

## 2020-07-01 NOTE — TELEPHONE ENCOUNTER
Routing refill request to provider for review/approval because:  Labs not current:  A1C    MARCIO WalshN, RN  [facility]

## 2020-07-05 DIAGNOSIS — S16.1XXA ACUTE STRAIN OF NECK MUSCLE, INITIAL ENCOUNTER: ICD-10-CM

## 2020-07-06 NOTE — TELEPHONE ENCOUNTER
tiZANidine (ZANAFLEX) 4 MG tablet   Last Written Prescription Date:  06/08/2020  Last Fill Quantity: 30,   # refills: 0  Last Office Visit: 03/24/2020  Future Office visit:       Routing refill request to provider for review/approval because:  Drug not on the [facility], [facility] or [facility] refill protocol or controlled substance

## 2020-07-17 DIAGNOSIS — S39.012A BACK STRAIN, INITIAL ENCOUNTER: ICD-10-CM

## 2020-07-17 RX ORDER — METHOCARBAMOL 750 MG/1
750 TABLET, FILM COATED ORAL 2 TIMES DAILY PRN
Qty: 60 TABLET | Refills: 0 | Status: SHIPPED | OUTPATIENT
Start: 2020-07-17 | End: 2020-08-25

## 2020-07-17 NOTE — TELEPHONE ENCOUNTER
Methocarbamol 750 MG       Last Written Prescription Date:  5/27/2020  Last Fill Quantity: 60,   # refills: 0  Last Office Visit: 3/24/2020  Future Office visit:       Routing refill request to provider for review/approval because:  Drug not on the [facility], [facility] or [facility] refill protocol or controlled substance

## 2020-08-04 DIAGNOSIS — S16.1XXA ACUTE STRAIN OF NECK MUSCLE, INITIAL ENCOUNTER: ICD-10-CM

## 2020-08-04 NOTE — TELEPHONE ENCOUNTER
Requested Prescriptions   Pending Prescriptions Disp Refills     tiZANidine (ZANAFLEX) 4 MG tablet [Pharmacy Med Name: TIZANIDINE 4MG TABLET] 30 tablet 0     Sig: TAKE 1 TABLET BY MOUTH DAILY AT BEDTIME     Last Written Prescription Date:  07/06/2020  Last Fill Quantity: 30,   # refills: 0  Last Office Visit: 03/24/2020  Future Office visit:       Routing refill request to provider for review/approval because:  Drug not on the [facility], [facility] or [facility] refill protocol or controlled substance    Adrienne Briscoe MA

## 2020-08-21 DIAGNOSIS — S16.1XXA ACUTE STRAIN OF NECK MUSCLE, INITIAL ENCOUNTER: ICD-10-CM

## 2020-08-21 NOTE — TELEPHONE ENCOUNTER
Pending Prescriptions:                       Disp   Refills    tiZANidine (ZANAFLEX) 4 MG tablet [Pharmac*30 tab*0        Sig: TAKE 1 TABLET BY MOUTH DAILY AT BEDTIME    Last Written Prescription Date:  8/4/20  Last Fill Quantity: 30,  # refills: 0   Last office visit: 3/24/2020 with prescribing provider:  Miley Leong Office Visit:        Routing refill request to provider for review/approval because:  Drug not on the [facility] refill protocol     Tyra Ortega RN on 8/21/2020 at 10:08 AM

## 2020-08-25 DIAGNOSIS — S39.012A BACK STRAIN, INITIAL ENCOUNTER: ICD-10-CM

## 2020-08-25 RX ORDER — METHOCARBAMOL 750 MG/1
750 TABLET, FILM COATED ORAL 2 TIMES DAILY PRN
Qty: 60 TABLET | Refills: 0 | Status: SHIPPED | OUTPATIENT
Start: 2020-08-25 | End: 2020-11-02

## 2020-08-25 NOTE — TELEPHONE ENCOUNTER
Requested Prescriptions   Pending Prescriptions Disp Refills     methocarbamol (ROBAXIN) 750 MG tablet [Pharmacy Med Name: METHOCARBAMOL 750MG TABLET] 60 tablet 0     Sig: TAKE 1 TABLET (750 MG) BY MOUTH 2 TIMES DAILY AS NEEDED FOR MUSCLE SPASMS     Last Written Prescription Date:  07/17/2020  Last Fill Quantity: 60,   # refills: 0  Last Office Visit: 03/24/2020  Future Office visit:       Routing refill request to provider for review/approval because:  Drug not on the [facility], [facility] or [facility] refill protocol or controlled substance    Adrienne Briscoe MA

## 2020-08-31 DIAGNOSIS — I10 HYPERTENSION GOAL BP (BLOOD PRESSURE) < 140/90: ICD-10-CM

## 2020-08-31 DIAGNOSIS — E11.69 TYPE 2 DIABETES MELLITUS WITH OTHER SPECIFIED COMPLICATION, WITHOUT LONG-TERM CURRENT USE OF INSULIN (H): ICD-10-CM

## 2020-08-31 RX ORDER — LOSARTAN POTASSIUM 100 MG/1
TABLET ORAL
Qty: 90 TABLET | Refills: 0 | Status: SHIPPED | OUTPATIENT
Start: 2020-08-31 | End: 2020-11-30

## 2020-08-31 RX ORDER — GLIPIZIDE 10 MG/1
TABLET ORAL
Qty: 90 TABLET | Refills: 0 | Status: SHIPPED | OUTPATIENT
Start: 2020-08-31 | End: 2020-11-03

## 2020-08-31 NOTE — TELEPHONE ENCOUNTER
Routing refill request to provider for review/approval because:  Labs not current:  A1C    MARCIO WalshN, RN  [facility]

## 2020-09-03 DIAGNOSIS — E11.69 TYPE 2 DIABETES MELLITUS WITH OTHER SPECIFIED COMPLICATION, WITHOUT LONG-TERM CURRENT USE OF INSULIN (H): ICD-10-CM

## 2020-09-03 RX ORDER — DAPAGLIFLOZIN 5 MG/1
TABLET, FILM COATED ORAL
Qty: 30 TABLET | Refills: 0 | Status: SHIPPED | OUTPATIENT
Start: 2020-09-03 | End: 2020-10-05

## 2020-09-03 NOTE — TELEPHONE ENCOUNTER
"Routing refill request to provider for review/approval because:  Labs out of range:  A1C  T'd up 1 month for provider review.      Requested Prescriptions   Pending Prescriptions Disp Refills     FARXIGA 5 MG TABS tablet [Pharmacy Med Name: dapagliflozin (FARXIGA) 5 MG TABS tablet] 30 tablet 0     Sig: TAKE 1 TABLET (5 MG) BY MOUTH DAILY APPOINTMENT NEEDED FOR ADDITIONAL REFILLS.   Last Written Prescription Date:  7/31/2020  Last Fill Quantity: 30,  # refills: 0   Last office visit: 3/24/2020 with prescribing provider:     Future Office Visit:        Sodium Glucose Co-Transport Inhibitor Agents Failed - 9/3/2020  1:16 AM        Failed - Patient has documented A1c within the specified period of time.     If HgbA1C is 8 or greater, it needs to be on file within the past 3 months.  If less than 8, must be on file within the past 6 months.     Recent Labs   Lab Test 03/13/20  1344   A1C 9.9*             Passed - No creatinine >1.4 or GFR <45 within the past 12 mos     Recent Labs   Lab Test 04/24/20 03/13/20  1344   GFRESTIMATED 81.8 >90   GFRESTBLACK  --  >90       Recent Labs   Lab Test 04/24/20   CR 1.070           Passed - Medication is active on med list        Passed - Patient is age 18 or older        Passed - Patient has documented normal Potassium within the last 12 mos.     Recent Labs   Lab Test 03/13/20  1344   POTASSIUM 3.7             Passed - Recent (6 mo) or future (30 days) visit within the authorizing provider's specialty     Patient had office visit in the last 6 months or has a visit in the next 30 days with authorizing provider or within the authorizing provider's specialty.  See \"Patient Info\" tab in inbasket, or \"Choose Columns\" in Meds & Orders section of the refill encounter.             Tori Salter RN      "

## 2020-10-03 DIAGNOSIS — S16.1XXA ACUTE STRAIN OF NECK MUSCLE, INITIAL ENCOUNTER: ICD-10-CM

## 2020-10-03 DIAGNOSIS — E11.69 TYPE 2 DIABETES MELLITUS WITH OTHER SPECIFIED COMPLICATION, WITHOUT LONG-TERM CURRENT USE OF INSULIN (H): ICD-10-CM

## 2020-10-05 RX ORDER — DAPAGLIFLOZIN 5 MG/1
TABLET, FILM COATED ORAL
Qty: 30 TABLET | Refills: 0 | Status: SHIPPED | OUTPATIENT
Start: 2020-10-05 | End: 2020-11-03

## 2020-10-05 NOTE — TELEPHONE ENCOUNTER
"Routing refill request to provider for review/approval because:  Imani given x1 and patient did not follow up, please advise  Labs out of range and not current  Tdup labs only for this refill, A1C    T'd up 1 month for provider review.    Requested Prescriptions   Pending Prescriptions Disp Refills     FARXIGA 5 MG TABS tablet [Pharmacy Med Name: dapagliflozin (FARXIGA) 5 MG TABS tablet] 30 tablet 0     Sig: TAKE 1 TABLET (5 MG) BY MOUTH DAILY APPOINTMENT NEEDED FOR ADDITIONAL REFILLS.   Last Written Prescription Date:  9/3/2020  Last Fill Quantity: 30,  # refills: 0   Last office visit: 3/24/2020 with prescribing provider:     Future Office Visit:        Sodium Glucose Co-Transport Inhibitor Agents Failed - 10/3/2020  1:02 AM        Failed - Patient has documented A1c within the specified period of time.     If HgbA1C is 8 or greater, it needs to be on file within the past 3 months.  If less than 8, must be on file within the past 6 months.     Recent Labs   Lab Test 03/13/20  1344   A1C 9.9*           Failed - Recent (6 mo) or future (30 days) visit within the authorizing provider's specialty     Patient had office visit in the last 6 months or has a visit in the next 30 days with authorizing provider or within the authorizing provider's specialty.  See \"Patient Info\" tab in inbasket, or \"Choose Columns\" in Meds & Orders section of the refill encounter.            Passed - No creatinine >1.4 or GFR <45 within the past 12 mos     Recent Labs   Lab Test 04/24/20 03/13/20  1344   GFRESTIMATED 81.8 >90   GFRESTBLACK  --  >90       Recent Labs   Lab Test 04/24/20   CR 1.070             Passed - Medication is active on med list        Passed - Patient is age 18 or older        Passed - Patient has documented normal Potassium within the last 12 mos.     Recent Labs   Lab Test 03/13/20  1344   POTASSIUM 3.7              Tori Salter RN      "

## 2020-10-05 NOTE — TELEPHONE ENCOUNTER
Zanaflex      Last Written Prescription Date:  8/21/2020  Last Fill Quantity: 30,   # refills: 0  Last Office Visit: 3/24/2020  Future Office visit:       Routing refill request to provider for review/approval because:  Drug not on the [facility], [facility] or [facility] refill protocol or controlled substance

## 2020-10-31 DIAGNOSIS — S39.012A BACK STRAIN, INITIAL ENCOUNTER: ICD-10-CM

## 2020-11-02 DIAGNOSIS — E11.69 TYPE 2 DIABETES MELLITUS WITH OTHER SPECIFIED COMPLICATION, WITHOUT LONG-TERM CURRENT USE OF INSULIN (H): ICD-10-CM

## 2020-11-02 DIAGNOSIS — S16.1XXA ACUTE STRAIN OF NECK MUSCLE, INITIAL ENCOUNTER: ICD-10-CM

## 2020-11-02 RX ORDER — METHOCARBAMOL 750 MG/1
750 TABLET, FILM COATED ORAL 2 TIMES DAILY PRN
Qty: 60 TABLET | Refills: 0 | Status: SHIPPED | OUTPATIENT
Start: 2020-11-02 | End: 2020-12-21

## 2020-11-02 NOTE — TELEPHONE ENCOUNTER
Robaxin      Last Written Prescription Date:  8/25/2020  Last Fill Quantity: 60,   # refills: 0  Last Office Visit: 3/24/2020  Future Office visit:       Routing refill request to provider for review/approval because:  Drug not on the [facility], [facility] or [facility] refill protocol or controlled substance

## 2020-11-03 RX ORDER — DAPAGLIFLOZIN 5 MG/1
TABLET, FILM COATED ORAL
Qty: 30 TABLET | Refills: 0 | Status: SHIPPED | OUTPATIENT
Start: 2020-11-03 | End: 2020-12-03

## 2020-11-03 RX ORDER — GLIPIZIDE 10 MG/1
TABLET ORAL
Qty: 90 TABLET | Refills: 0 | Status: SHIPPED | OUTPATIENT
Start: 2020-11-03 | End: 2020-12-03

## 2020-11-03 NOTE — TELEPHONE ENCOUNTER
Zanaflex      Last Written Prescription Date:  10/5/2020  Last Fill Quantity: 30,   # refills: 0  Last Office Visit: 3/24/2020  Future Office visit:       Routing refill request to provider for review/approval because:  Drug not on the [facility], [facility] or [facility] refill protocol or controlled substance    Routing refill request to provider for review/approval because:  Imani given x1 and patient did not follow up, please advise  Labs not current:  A1C  Patient needs to be seen because:  Due for diabetic visit    EVELYN Walsh, RN  [facility]

## 2020-11-04 DIAGNOSIS — S16.1XXA ACUTE STRAIN OF NECK MUSCLE, INITIAL ENCOUNTER: ICD-10-CM

## 2020-11-04 DIAGNOSIS — E11.69 TYPE 2 DIABETES MELLITUS WITH OTHER SPECIFIED COMPLICATION, WITHOUT LONG-TERM CURRENT USE OF INSULIN (H): ICD-10-CM

## 2020-11-05 RX ORDER — GLIPIZIDE 10 MG/1
TABLET ORAL
Qty: 90 TABLET | Refills: 0 | OUTPATIENT
Start: 2020-11-05

## 2020-11-05 RX ORDER — DAPAGLIFLOZIN 5 MG/1
TABLET, FILM COATED ORAL
Qty: 30 TABLET | Refills: 0 | OUTPATIENT
Start: 2020-11-05

## 2020-11-05 NOTE — TELEPHONE ENCOUNTER
Prescription (Glipizide) was sent 11/3/2020 for #90 with 0 refills.  Prescription (Farxiga) was sent 11/3/2020 for #30 with 0 refills.  Prescription (Zanaflex) was sent 11/3/2020 for #30 with 0 refills.  Pharmacy notified via E-Prescribe refusal.     MARCIO WalshN, RN  [facility]

## 2020-11-28 DIAGNOSIS — I10 HYPERTENSION GOAL BP (BLOOD PRESSURE) < 140/90: ICD-10-CM

## 2020-11-28 DIAGNOSIS — L70.0 ACNE VULGARIS: ICD-10-CM

## 2020-11-28 NOTE — LETTER
[address] [address]  [phone]          December 3, 2020          Waqas Allen  [address] [address] [place] [id number]            Dear Waqas,    You are due for a yearly physical. Please call and schedule before your medication runs out.     Sincerely,        Chance Trent, DO

## 2020-11-30 RX ORDER — MINOCYCLINE HYDROCHLORIDE 100 MG/1
100 CAPSULE ORAL 2 TIMES DAILY
Qty: 180 CAPSULE | Refills: 0 | Status: SHIPPED | OUTPATIENT
Start: 2020-11-30 | End: 2021-03-01

## 2020-11-30 RX ORDER — LOSARTAN POTASSIUM 100 MG/1
TABLET ORAL
Qty: 90 TABLET | Refills: 0 | Status: SHIPPED | OUTPATIENT
Start: 2020-11-30 | End: 2021-02-26

## 2020-11-30 NOTE — TELEPHONE ENCOUNTER
"Routing refill request to provider for review/approval because:  Imani given x1 and patient did not follow up, please advise    Cozaar  Last Written Prescription Date:  8/31/2020  Last Fill Quantity 90,  # refills: 0   Last office visit: 3/24/2020 with prescribing provider:  Miley   Future Office Visit:      Requested Prescriptions   Pending Prescriptions Disp Refills     losartan (COZAAR) 100 MG tablet [Pharmacy Med Name: LOSARTAN 100MG TABLET] 90 tablet 0     Sig: TAKE 1 TABLET BY MOUTH EVERY DAY       Angiotensin-II Receptors Passed - 11/28/2020  1:02 AM        Passed - Last blood pressure under 140/90 in past 12 months     BP Readings from Last 3 Encounters:   03/24/20 120/76   03/13/20 130/82   03/09/20 124/80                 Passed - Recent (12 mo) or future (30 days) visit within the authorizing provider's specialty     Patient has had an office visit with the authorizing provider or a provider within the authorizing providers department within the previous 12 mos or has a future within next 30 days. See \"Patient Info\" tab in inbasket, or \"Choose Columns\" in Meds & Orders section of the refill encounter.              Passed - Medication is active on med list        Passed - Patient is age 18 or older        Passed - Normal serum creatinine on file in past 12 months     Recent Labs   Lab Test 04/24/20   CR 1.070       Ok to refill medication if creatinine is low          Passed - Normal serum potassium on file in past 12 months     Recent Labs   Lab Test 03/13/20  1344   POTASSIUM 3.7                       Yamilex Chowdhury RN on 11/30/2020 at 9:28 AM    "

## 2020-11-30 NOTE — TELEPHONE ENCOUNTER
Routing to schedulers to set up F2F appointment for patient for yearly.  Patient has been given #90 to get to appointment per triage protocol.  Saima Butt, MARCION, RN

## 2020-12-01 NOTE — TELEPHONE ENCOUNTER
"Routing refill request to provider for review/approval because:  Patient needs to be seen because:  6 month diabetic follow up  Yamilex Chowdhury RN, BSN      Pioglitazone  Last Written Prescription Date:  2/5/2018  Last Fill Quantity: 90,  # refills: 1   Last office visit: 3/26/2018 with prescribing provider:  3/26/2018   Future Office Visit:      Requested Prescriptions   Pending Prescriptions Disp Refills     pioglitazone (ACTOS) 30 MG tablet [Pharmacy Med Name: PIOGLITAZONE 30MG TAB] 90 tablet      Sig: TAKE 1 TABLET BY MOUTH EVERY DAY    Thiazolidinedione Agents (TZDs)  Failed    10/8/2018  1:02 AM       Failed - Blood pressure less than 140/90 in past 6 months    BP Readings from Last 3 Encounters:   07/06/18 (!) 168/111   03/26/18 132/86   03/16/18 148/80                Failed - Patient has documented A1c within the specified period of time.    If HgbA1C is 8 or greater, it needs to be on file within the past 3 months.  If less than 8, must be on file within the past 6 months.     Recent Labs   Lab Test  03/26/18   0840   A1C  8.6*            Failed - Recent (6 mo) or future (30 days) visit within the authorizing provider's specialty    Patient had office visit in the last 6 months or has a visit in the next 30 days with authorizing provider or within the authorizing provider's specialty.  See \"Patient Info\" tab in inbasket, or \"Choose Columns\" in Meds & Orders section of the refill encounter.           Passed - Patient has documented LDL within the past 12 mos.    Recent Labs   Lab Test  03/26/18   0840   LDL  16            Passed - Patient has a normal ALT within the past 12 mos.    Recent Labs   Lab Test  03/26/18   0840   ALT  30            Passed - Patient has a normal AST within the past 12 mos.     Recent Labs   Lab Test  03/26/18   0840   AST  19            Passed - Patient has had a Microalbumin in the past 12 mos.    Recent Labs   Lab Test  03/26/18   0850   MICROL  48   UMALCR  28.80*            " Passed - Diagnosis not CHF       Passed - Patient is age 18 or older       Passed - Patient has a normal serum Creatinine in the past 12 months    Recent Labs   Lab Test  03/26/18   0840   CR  0.92             Yamilex Chowdhury RN on 10/9/2018 at 12:28 PM   Yes

## 2020-12-03 DIAGNOSIS — S16.1XXA ACUTE STRAIN OF NECK MUSCLE, INITIAL ENCOUNTER: ICD-10-CM

## 2020-12-03 DIAGNOSIS — E11.69 TYPE 2 DIABETES MELLITUS WITH OTHER SPECIFIED COMPLICATION, WITHOUT LONG-TERM CURRENT USE OF INSULIN (H): ICD-10-CM

## 2020-12-03 RX ORDER — GLIPIZIDE 10 MG/1
TABLET ORAL
Qty: 90 TABLET | Refills: 0 | Status: SHIPPED | OUTPATIENT
Start: 2020-12-03 | End: 2021-02-02

## 2020-12-03 RX ORDER — DAPAGLIFLOZIN 5 MG/1
TABLET, FILM COATED ORAL
Qty: 30 TABLET | Refills: 0 | Status: SHIPPED | OUTPATIENT
Start: 2020-12-03 | End: 2021-01-05

## 2020-12-03 NOTE — TELEPHONE ENCOUNTER
FARXIGA 5 MG TABS tablet      Last Written Prescription Date:  11/3/2020  Last Fill Quantity: 30,   # refills: 0  Last Office Visit: 3/24/2020  Future Office visit:       glipiZIDE (GLUCOTROL) 10 MG tablet      Last Written Prescription Date:  11/3/2020  Last Fill Quantity: 90,   # refills: 0  Last Office Visit: 3/24/2020  Future Office visit:       tiZANidine (ZANAFLEX) 4 MG tablet      Last Written Prescription Date:  11/3/2020  Last Fill Quantity: 30,   # refills: 0  Last Office Visit: 3/24/2020  Future Office visit:       Patient was to make an appointment for additional refills. No further appointment at this time. Please advise.  Jennifer Larios CMA

## 2020-12-18 DIAGNOSIS — S39.012A BACK STRAIN, INITIAL ENCOUNTER: ICD-10-CM

## 2020-12-21 RX ORDER — METHOCARBAMOL 750 MG/1
750 TABLET, FILM COATED ORAL 2 TIMES DAILY PRN
Qty: 60 TABLET | Refills: 0 | Status: SHIPPED | OUTPATIENT
Start: 2020-12-21 | End: 2021-02-17

## 2020-12-21 NOTE — TELEPHONE ENCOUNTER
Routing to team to set up F2F appointment for patient for yearly.    Routing refill request to provider for review/approval because:  Drug not on the [facility] refill protocol   Last Written Prescription Date:  11/2/2020  Last Fill Quantity: 60,  # refills: 0   Last office visit: 3/24/2020 with prescribing provider:     Future Office Visit:      MARCIO SheridanN, RN

## 2021-01-02 DIAGNOSIS — E11.69 TYPE 2 DIABETES MELLITUS WITH OTHER SPECIFIED COMPLICATION, WITHOUT LONG-TERM CURRENT USE OF INSULIN (H): ICD-10-CM

## 2021-01-02 DIAGNOSIS — S16.1XXA ACUTE STRAIN OF NECK MUSCLE, INITIAL ENCOUNTER: ICD-10-CM

## 2021-01-04 NOTE — TELEPHONE ENCOUNTER
Farxiga  Routing refill request to provider for review/approval because:  Labs not current:  A1C  Per protocol, patient needs 6 month diabetic follow up.     Zanaflex      Last Written Prescription Date:  12/3/2020  Last Fill Quantity: 30,   # refills: 0  Last Office Visit: 3/24/2020  Future Office visit:   None  Routing refill request to provider for review/approval because:  Drug not on the [facility], [facility] or  [facility] refill protocol or controlled substance    Yola Sanchez RN

## 2021-01-05 RX ORDER — DAPAGLIFLOZIN 5 MG/1
TABLET, FILM COATED ORAL
Qty: 30 TABLET | Refills: 0 | Status: SHIPPED | OUTPATIENT
Start: 2021-01-05 | End: 2021-02-02

## 2021-01-09 ENCOUNTER — HEALTH MAINTENANCE LETTER (OUTPATIENT)
Age: 41
End: 2021-01-09

## 2021-01-15 ENCOUNTER — HOSPITAL ENCOUNTER (OUTPATIENT)
Dept: GENERAL RADIOLOGY | Facility: CLINIC | Age: 41
Discharge: HOME | End: 2021-01-15
Attending: INTERNAL MEDICINE | Admitting: INTERNAL MEDICINE
Payer: COMMERCIAL

## 2021-01-15 ENCOUNTER — OFFICE VISIT (OUTPATIENT)
Dept: INTERNAL MEDICINE | Facility: CLINIC | Age: 41
End: 2021-01-15
Payer: COMMERCIAL

## 2021-01-15 VITALS
BODY MASS INDEX: 48.32 KG/M2 | SYSTOLIC BLOOD PRESSURE: 126 MMHG | OXYGEN SATURATION: 100 % | WEIGHT: 290 LBS | TEMPERATURE: 99.1 F | HEIGHT: 65 IN | HEART RATE: 102 BPM | RESPIRATION RATE: 20 BRPM | DIASTOLIC BLOOD PRESSURE: 64 MMHG

## 2021-01-15 DIAGNOSIS — E78.5 HYPERLIPIDEMIA LDL GOAL <130: ICD-10-CM

## 2021-01-15 DIAGNOSIS — M53.3 COCCYDYNIA: ICD-10-CM

## 2021-01-15 DIAGNOSIS — E66.01 MORBID OBESITY (H): ICD-10-CM

## 2021-01-15 DIAGNOSIS — E11.69 TYPE 2 DIABETES MELLITUS WITH OTHER SPECIFIED COMPLICATION, WITHOUT LONG-TERM CURRENT USE OF INSULIN (H): ICD-10-CM

## 2021-01-15 DIAGNOSIS — Z13.220 SCREENING FOR HYPERLIPIDEMIA: Primary | ICD-10-CM

## 2021-01-15 DIAGNOSIS — I10 HYPERTENSION GOAL BP (BLOOD PRESSURE) < 140/90: ICD-10-CM

## 2021-01-15 PROCEDURE — 99214 OFFICE O/P EST MOD 30 MIN: CPT | Performed by: INTERNAL MEDICINE

## 2021-01-15 PROCEDURE — 72202 X-RAY EXAM SI JOINTS 3/> VWS: CPT

## 2021-01-15 RX ORDER — PREDNISONE 20 MG/1
40 TABLET ORAL DAILY
Qty: 10 TABLET | Refills: 0 | Status: SHIPPED | OUTPATIENT
Start: 2021-01-15 | End: 2021-12-13

## 2021-01-15 ASSESSMENT — PAIN SCALES - GENERAL: PAINLEVEL: EXTREME PAIN (8)

## 2021-01-15 ASSESSMENT — MIFFLIN-ST. JEOR: SCORE: 2157.31

## 2021-01-15 NOTE — PROGRESS NOTES
"      Glenys Perez is a 40 year old who presents to clinic today for the following health issues     HPI       Chief Complaint   Patient presents with     Fall     slipped on stairs yesterday, having tailbone pain         EMR reviewed including:             Complaint, History of Chief Complaint, Corresponding Review of Systems, and Complaint Specific Physical Examination.    #1   Slipped on stairs and fell on tailbone yesterday.  Mild paresthesias in penis and around anus weakness or loss of function.  No other injury.  Plan:Significant reproducible pain with palpation of the coccyx.      #2   Follow-up of diabetes.  Blood sugars generally under 150.  Has been \"cutting out sugar\".  Denies symptoms of hyper or hypokalemia including polyuria, polydipsia or vision changes.      #3   Follow-up of ongoing hypertension.    Tolerating losartan without difficulty.  Symptoms of hypotension.    Blood pressure today is 126/64  Exam: Heart is regular without rubs clicks or murmurs.  Lungs are clear without rubs, rhonchi.  BMP   /64 (BP Location: Right arm, Patient Position: Sitting, Cuff Size: Adult Large)   Pulse 102   Temp 99.1  F (37.3  C) (Temporal)   Resp 20   Ht 1.651 m (5' 5\")   Wt 131.5 kg (290 lb)   SpO2 100%   BMI 48.26 kg/m               Decision Making    Problem and Complexity     1. Coccydynia  We will obtain x-ray to rule out fracture.  Given the associated paresthesias, left short course of prednisone to decreased inflammation.  Patient instructed to be cautious of sugars.  Recommended hard bottom chair to avoid direct pressure.  May benefit from a \"doughnut cold.  - XR Sacrum and Coccyx 2 Views; Future      3. Type 2 diabetes mellitus with other specified complication, without long-term current use of insulin (H)  Check hemoglobin A1c for blood sugar management.  Continue current medications and adjust as needed.  - HEMOGLOBIN A1C  - Albumin Random Urine Quantitative with Creat Ratio    4. " Hypertension goal BP (blood pressure) < 140/90  Currently controlled.  Continue losartan.    5. Morbid obesity (H)  Recommend continued weight caloric restriction.    6. Hyperlipidemia LDL goal <130  Check lipid level, strongly recommend statin therapy given history of diabetes, obesity etc.  Patient hesitant.  - Lipid panel reflex to direct LDL Non-fasting          ------------------------------------------------------------------------------------------------------------------------------  Data  1  Specialty (external) notes reviewed:   None    Tests reviewed:   X-ray reviewed.     Tests ordered:   Lab work ordered    Independent Historians Interview:   None    2  Review of outside Tests:   None    3   Verbal Discussion with Specialists:    None      ----------------------------------------------------------------------------------------------------------------------------------  Risk   Prescription drug management:   Yes                                High risk for toxicity:     Decision regarding surgery:    None     Social determinants of health:   No     Decision to withhold therapy:   None                                   FOLLOW UP   I have asked the patient to make an appointment for followup with me in 2 weeks            I have carefully explained the diagnosis and treatment options to the patient.  The patient has displayed an understanding of the above, and all subsequent questions were answered.      DO ROSE MARY Miller    Portions of this note were produced using [employer]  Although every attempt at real-time proof reading has been made, occasional grammar/syntax errors may have been missed.

## 2021-01-18 ENCOUNTER — MYC MEDICAL ADVICE (OUTPATIENT)
Dept: INTERNAL MEDICINE | Facility: CLINIC | Age: 41
End: 2021-01-18

## 2021-01-18 DIAGNOSIS — M53.3 COCCYDYNIA: Primary | ICD-10-CM

## 2021-01-18 RX ORDER — OXYCODONE HYDROCHLORIDE 5 MG/1
5 TABLET ORAL EVERY 6 HOURS PRN
Qty: 12 TABLET | Refills: 0 | Status: SHIPPED | OUTPATIENT
Start: 2021-01-18 | End: 2021-01-30

## 2021-01-19 NOTE — TELEPHONE ENCOUNTER
"The patient would like to see his personal back surgeon.  That surgeon is requesting an MRI of prior to the visit.  An order has been placed blood, the patient requests \"open MRI\".  Please help him set this up.    Thank you.    Miley"

## 2021-01-19 NOTE — TELEPHONE ENCOUNTER
Patient called today to see why the MRI was not sent to [facility] in [place] due to needing an open sided MRI.. .Informed him that I will fax the order now to [facility] for them to reach out to you to schedule... Fax is [phone]

## 2021-01-22 ENCOUNTER — TRANSFERRED RECORDS (OUTPATIENT)
Dept: HEALTH INFORMATION MANAGEMENT | Facility: CLINIC | Age: 41
End: 2021-01-22

## 2021-01-25 ENCOUNTER — MYC MEDICAL ADVICE (OUTPATIENT)
Dept: INTERNAL MEDICINE | Facility: CLINIC | Age: 41
End: 2021-01-25

## 2021-01-25 DIAGNOSIS — L02.211 CUTANEOUS ABSCESS OF ABDOMINAL WALL: Primary | ICD-10-CM

## 2021-01-25 RX ORDER — AMOXICILLIN AND CLAVULANATE POTASSIUM 500; 125 MG/1; MG/1
1 TABLET, FILM COATED ORAL 3 TIMES DAILY
Qty: 21 TABLET | Refills: 0 | Status: SHIPPED | OUTPATIENT
Start: 2021-01-25 | End: 2021-03-11

## 2021-01-25 NOTE — TELEPHONE ENCOUNTER
I have sent a prescription for Augmentin to the [facility] pharmacy.    If the patient does not have significant improvement upon conclusion of the course of antibiotic, he will need to make a follow-up appointment.    Miley Grullon....  Hi.

## 2021-01-26 ENCOUNTER — OFFICE VISIT (OUTPATIENT)
Dept: NEUROSURGERY | Facility: CLINIC | Age: 41
End: 2021-01-26
Attending: NEUROLOGICAL SURGERY
Payer: COMMERCIAL

## 2021-01-26 VITALS
HEIGHT: 65 IN | WEIGHT: 289 LBS | BODY MASS INDEX: 48.15 KG/M2 | DIASTOLIC BLOOD PRESSURE: 75 MMHG | HEART RATE: 80 BPM | OXYGEN SATURATION: 100 % | SYSTOLIC BLOOD PRESSURE: 127 MMHG

## 2021-01-26 DIAGNOSIS — M53.3 COCCYDYNIA: ICD-10-CM

## 2021-01-26 PROCEDURE — 99202 OFFICE O/P NEW SF 15 MIN: CPT | Performed by: NEUROLOGICAL SURGERY

## 2021-01-26 ASSESSMENT — PAIN SCALES - GENERAL: PAINLEVEL: EXTREME PAIN (8)

## 2021-01-26 ASSESSMENT — MIFFLIN-ST. JEOR: SCORE: 2152.78

## 2021-01-26 NOTE — LETTER
1/26/2021         RE: Waqas Allen  [address] [place] [address]        Dear Colleague,    Thank you for referring your patient, Waqas Allen, to the [facility] [place]. Please see a copy of my visit note below.    Mr. Allen is evaluated today for bilateral buttock pain which began after a fall on the ice approximately 2 weeks ago.  In that incident, he slipped on ice while descending a staircase, fell and struck his buttocks on the edge of one of the steps.  He was able to arise without assistance.  His complaints since that time has been intractable pain across the mid buttocks.  He denies numbness weakness or other symptoms involving his lower extremities.  He has no complaint of changes in bowel or bladder control.  He was seen by his primary care physician for this problem and plain radiographs of his sacrum and coccyx did not show evidence of fracture or angulation.  He is also undergone a lumbar MRI scan subsequent to this fall which likewise does not show significant sacral pathology.    On examination in the office today, he exhibits pain behavior when changing position and reports that prolonged sitting can be problematic.  He is able to arise from a seated to a standing position without difficulty and with minimal pain behavior.  Gait appears slow but normal.  Motor examination of his bilateral lower extremity shows no evidence of focal weakness.  There is no evidence of dermatomal sensory disturbance.  Lumbar range of motion appears to be normal for age.  His pain is present in the midportion of his buttocks and not particularly prominent centrally.  Straight leg raising is negative bilaterally.    His recent lumbar MRI scan obtained at [facility] is reviewed.  This study demonstrates multilevel degenerative lumbar disc changes most notably at L3-4, L4-5 and L5-S1.  He has a history of prior surgery at the right L4-5 level.  That surgery performed in 2007 was  uncomplicated.  He notes occasional transient pain into his right leg with increased activity.  However, this is not a current complaint and was not exacerbated by his fall.    Assessment: Primarily soft tissue injury as a consequence of a fall less than 2 weeks ago.  There is no evidence on review of plain films or his recent lumbar MRI scan of fracture, dislocation, canal compromise nor clinical complaints or findings consistent with radiculopathy or spinal stenosis.  Given the circumstances, I believe that this problem will likely resolve with time.  I spoke with him regarding management alternatives to include minor analgesics.  I would not recommend injections or other invasive therapies.  I told him that if his symptoms have not resolved after 60 days that the possibility of a bone scan might be considered to determine if there is an occult fracture which has not healed.    He appeared to have a good understanding of the discussion, differential diagnosis and management alternatives offered.  I reassured him there is no indication for invasive therapy including surgery.    Approximately 20 minutes was spent direct contact with the patient the majority reviewing the clinical and radiographic findings, differential diagnosis, management alternatives and risks.      Again, thank you for allowing me to participate in the care of your patient.        Sincerely,        Ochoa Gamble MD

## 2021-01-26 NOTE — PROGRESS NOTES
Mr. Allen is evaluated today for bilateral buttock pain which began after a fall on the ice approximately 2 weeks ago.  In that incident, he slipped on ice while descending a staircase, fell and struck his buttocks on the edge of one of the steps.  He was able to arise without assistance.  His complaints since that time has been intractable pain across the mid buttocks.  He denies numbness weakness or other symptoms involving his lower extremities.  He has no complaint of changes in bowel or bladder control.  He was seen by his primary care physician for this problem and plain radiographs of his sacrum and coccyx did not show evidence of fracture or angulation.  He is also undergone a lumbar MRI scan subsequent to this fall which likewise does not show significant sacral pathology.    On examination in the office today, he exhibits pain behavior when changing position and reports that prolonged sitting can be problematic.  He is able to arise from a seated to a standing position without difficulty and with minimal pain behavior.  Gait appears slow but normal.  Motor examination of his bilateral lower extremity shows no evidence of focal weakness.  There is no evidence of dermatomal sensory disturbance.  Lumbar range of motion appears to be normal for age.  His pain is present in the midportion of his buttocks and not particularly prominent centrally.  Straight leg raising is negative bilaterally.    His recent lumbar MRI scan obtained at [facility] is reviewed.  This study demonstrates multilevel degenerative lumbar disc changes most notably at L3-4, L4-5 and L5-S1.  He has a history of prior surgery at the right L4-5 level.  That surgery performed in 2007 was uncomplicated.  He notes occasional transient pain into his right leg with increased activity.  However, this is not a current complaint and was not exacerbated by his fall.    Assessment: Primarily soft tissue injury as a consequence of a fall less than 2 weeks ago.   There is no evidence on review of plain films or his recent lumbar MRI scan of fracture, dislocation, canal compromise nor clinical complaints or findings consistent with radiculopathy or spinal stenosis.  Given the circumstances, I believe that this problem will likely resolve with time.  I spoke with him regarding management alternatives to include minor analgesics.  I would not recommend injections or other invasive therapies.  I told him that if his symptoms have not resolved after 60 days that the possibility of a bone scan might be considered to determine if there is an occult fracture which has not healed.    He appeared to have a good understanding of the discussion, differential diagnosis and management alternatives offered.  I reassured him there is no indication for invasive therapy including surgery.    Approximately 20 minutes was spent direct contact with the patient the majority reviewing the clinical and radiographic findings, differential diagnosis, management alternatives and risks.

## 2021-01-26 NOTE — NURSING NOTE
"January 26, 2021 10:16 AM   Waqas Allen is a 40 year old male who presents for:    Chief Complaint   Patient presents with     Consult     fell on the steps, messed up his tailbone      Initial Vitals: /75   Pulse 80   Ht 5' 5\" (1.651 m)   Wt 289 lb (131.1 kg)   SpO2 100%   BMI 48.09 kg/m   Estimated body mass index is 48.09 kg/m  as calculated from the following:    Height as of this encounter: 5' 5\" (1.651 m).    Weight as of this encounter: 289 lb (131.1 kg). Body surface area is 2.45 meters squared.  Extreme Pain (8) Comment: Data Unavailable       Clinical concerns: Waqas Allen is here today for consult to review his scans because he fell on the steps and messed his tailbone.    Nirav De La Rosa MA  "

## 2021-01-31 DIAGNOSIS — E11.69 TYPE 2 DIABETES MELLITUS WITH OTHER SPECIFIED COMPLICATION, WITHOUT LONG-TERM CURRENT USE OF INSULIN (H): ICD-10-CM

## 2021-01-31 DIAGNOSIS — S16.1XXA ACUTE STRAIN OF NECK MUSCLE, INITIAL ENCOUNTER: ICD-10-CM

## 2021-02-01 DIAGNOSIS — E11.69 TYPE 2 DIABETES MELLITUS WITH OTHER SPECIFIED COMPLICATION, WITHOUT LONG-TERM CURRENT USE OF INSULIN (H): ICD-10-CM

## 2021-02-01 NOTE — TELEPHONE ENCOUNTER
Per fax from [facility] patient is questioning the directions/ Was previously 2 in the am and 1 in the PM. He was unaware of the changes on this Glipizide.     Please verify correct directions before sending.   Adrienne Briscoe MA

## 2021-02-02 ENCOUNTER — TELEPHONE (OUTPATIENT)
Dept: INTERNAL MEDICINE | Facility: CLINIC | Age: 41
End: 2021-02-02

## 2021-02-02 DIAGNOSIS — E11.69 TYPE 2 DIABETES MELLITUS WITH OTHER SPECIFIED COMPLICATION, WITHOUT LONG-TERM CURRENT USE OF INSULIN (H): ICD-10-CM

## 2021-02-02 DIAGNOSIS — E78.5 HYPERLIPIDEMIA LDL GOAL <130: ICD-10-CM

## 2021-02-02 LAB
CHOLEST SERPL-MCNC: 149 MG/DL
CREAT UR-MCNC: 86 MG/DL
HBA1C MFR BLD: 9.9 % (ref 0–5.6)
HDLC SERPL-MCNC: 26 MG/DL
LDLC SERPL CALC-MCNC: ABNORMAL MG/DL
LDLC SERPL DIRECT ASSAY-MCNC: 61 MG/DL
MICROALBUMIN UR-MCNC: 48 MG/L
MICROALBUMIN/CREAT UR: 56.41 MG/G CR (ref 0–17)
NONHDLC SERPL-MCNC: 123 MG/DL
TRIGL SERPL-MCNC: 756 MG/DL

## 2021-02-02 PROCEDURE — 82043 UR ALBUMIN QUANTITATIVE: CPT | Performed by: INTERNAL MEDICINE

## 2021-02-02 PROCEDURE — 83721 ASSAY OF BLOOD LIPOPROTEIN: CPT | Mod: 59 | Performed by: INTERNAL MEDICINE

## 2021-02-02 PROCEDURE — 36415 COLL VENOUS BLD VENIPUNCTURE: CPT | Performed by: INTERNAL MEDICINE

## 2021-02-02 PROCEDURE — 83036 HEMOGLOBIN GLYCOSYLATED A1C: CPT | Performed by: INTERNAL MEDICINE

## 2021-02-02 PROCEDURE — 80061 LIPID PANEL: CPT | Performed by: INTERNAL MEDICINE

## 2021-02-02 RX ORDER — DAPAGLIFLOZIN 5 MG/1
TABLET, FILM COATED ORAL
Qty: 30 TABLET | Refills: 0 | Status: SHIPPED | OUTPATIENT
Start: 2021-02-02 | End: 2021-03-05

## 2021-02-02 RX ORDER — GLIPIZIDE 10 MG/1
TABLET ORAL
Qty: 90 TABLET | Refills: 0 | Status: SHIPPED | OUTPATIENT
Start: 2021-02-02 | End: 2021-02-03

## 2021-02-02 NOTE — TELEPHONE ENCOUNTER
Reason for Call:  Medication or medication refill:    Do you use a [facility]?  Name of the pharmacy and phone number for the current request:  [facility] - [phone]    Name of the medication requested: glipizide 10mg, patient previously taking 2 in the morning and 1 in the afternoon    Other request: none    Can we leave a detailed message on this number? YES    Phone number patient can be reached at: Other phone number: Antoinette Paul [phone]    Best Time: any    Call taken on 2/2/2021 at 5:44 PM by Rosa Samaniego

## 2021-02-02 NOTE — TELEPHONE ENCOUNTER
Routing refill request to provider for review/approval because:  Labs not current:  A1C    Yola Sanchez RN

## 2021-02-02 NOTE — TELEPHONE ENCOUNTER
Left message for patient to return call. Please assist in scheduling a lab appointment. This needs to be done in order for him to continue getting his medications refilled.

## 2021-02-02 NOTE — TELEPHONE ENCOUNTER
Routing refill request to provider for review/approval because:  Labs out of range:  A1C  Labs not current:  A1C    Yola Sanchez RN

## 2021-02-03 RX ORDER — GLIPIZIDE 10 MG/1
TABLET ORAL
Qty: 90 TABLET | Refills: 0 | Status: SHIPPED | OUTPATIENT
Start: 2021-02-03 | End: 2021-02-17

## 2021-02-16 DIAGNOSIS — E11.69 TYPE 2 DIABETES MELLITUS WITH OTHER SPECIFIED COMPLICATION, WITHOUT LONG-TERM CURRENT USE OF INSULIN (H): ICD-10-CM

## 2021-02-17 DIAGNOSIS — S39.012A BACK STRAIN, INITIAL ENCOUNTER: ICD-10-CM

## 2021-02-17 RX ORDER — METHOCARBAMOL 750 MG/1
750 TABLET, FILM COATED ORAL 2 TIMES DAILY PRN
Qty: 60 TABLET | Refills: 0 | Status: SHIPPED | OUTPATIENT
Start: 2021-02-17 | End: 2021-04-27

## 2021-02-17 RX ORDER — GLIPIZIDE 10 MG/1
TABLET ORAL
Qty: 90 TABLET | Refills: 1 | Status: SHIPPED | OUTPATIENT
Start: 2021-02-17 | End: 2021-04-27

## 2021-02-17 NOTE — TELEPHONE ENCOUNTER
Prescription approved per [facility] Refill Protocol.    Yola Sanchez RN

## 2021-02-17 NOTE — TELEPHONE ENCOUNTER
Pending Prescriptions:                       Disp   Refills    methocarbamol (ROBAXIN) 750 MG tablet [Pha*60 tab*0        Sig: TAKE 1 TABLET (750 MG) BY MOUTH 2 TIMES DAILY AS           NEEDED FOR MUSCLE SPASMS        Routing refill request to provider for review/approval because:  Drug not on the [facility] refill protocol   Last Seen: 1/15/21  Last Refilled: 12/21/20   Next Visit: VAMSI Stone RN, BSN  [place]  February 17, 2021

## 2021-02-26 DIAGNOSIS — L70.0 ACNE VULGARIS: ICD-10-CM

## 2021-02-26 DIAGNOSIS — I10 HYPERTENSION GOAL BP (BLOOD PRESSURE) < 140/90: ICD-10-CM

## 2021-02-26 RX ORDER — LOSARTAN POTASSIUM 100 MG/1
TABLET ORAL
Qty: 90 TABLET | Refills: 1 | Status: SHIPPED | OUTPATIENT
Start: 2021-02-26 | End: 2021-08-04

## 2021-03-01 RX ORDER — MINOCYCLINE HYDROCHLORIDE 100 MG/1
100 CAPSULE ORAL 2 TIMES DAILY
Qty: 180 CAPSULE | Refills: 1 | Status: SHIPPED | OUTPATIENT
Start: 2021-03-01 | End: 2021-08-04

## 2021-03-05 DIAGNOSIS — E11.69 TYPE 2 DIABETES MELLITUS WITH OTHER SPECIFIED COMPLICATION, WITHOUT LONG-TERM CURRENT USE OF INSULIN (H): ICD-10-CM

## 2021-03-05 DIAGNOSIS — S16.1XXA ACUTE STRAIN OF NECK MUSCLE, INITIAL ENCOUNTER: ICD-10-CM

## 2021-03-05 RX ORDER — DAPAGLIFLOZIN 5 MG/1
5 TABLET, FILM COATED ORAL DAILY
Qty: 90 TABLET | Refills: 0 | Status: SHIPPED | OUTPATIENT
Start: 2021-03-05 | End: 2021-06-03

## 2021-03-05 NOTE — TELEPHONE ENCOUNTER
Prescription approved per [facility] Refill Protocol (Farxiga).    Mary Marroquin, BSN, RN  [facility]

## 2021-03-05 NOTE — TELEPHONE ENCOUNTER
Zanaflex      Last Written Prescription Date:  2/1/2021  Last Fill Quantity: 30,   # refills: 0  Last Office Visit: 1/15/2021  Future Office visit:    Next 5 appointments (look out 90 days)    Mar 11, 2021  4:20 PM  SHORT with Chance Trent DO  [facility] ([facility] ) [address] [place] [id number] [phone]           Routing refill request to provider for review/approval because:  Drug not on the [facility], [facility] or [facility] refill protocol or controlled substance

## 2021-03-11 ENCOUNTER — OFFICE VISIT (OUTPATIENT)
Dept: INTERNAL MEDICINE | Facility: CLINIC | Age: 41
End: 2021-03-11
Payer: COMMERCIAL

## 2021-03-11 VITALS
RESPIRATION RATE: 18 BRPM | DIASTOLIC BLOOD PRESSURE: 78 MMHG | BODY MASS INDEX: 47.43 KG/M2 | SYSTOLIC BLOOD PRESSURE: 132 MMHG | OXYGEN SATURATION: 100 % | WEIGHT: 285 LBS | TEMPERATURE: 97.3 F | HEART RATE: 96 BPM

## 2021-03-11 DIAGNOSIS — E66.01 MORBID OBESITY (H): ICD-10-CM

## 2021-03-11 DIAGNOSIS — E11.69 TYPE 2 DIABETES MELLITUS WITH OTHER SPECIFIED COMPLICATION, WITHOUT LONG-TERM CURRENT USE OF INSULIN (H): ICD-10-CM

## 2021-03-11 DIAGNOSIS — L02.211 ABDOMINAL WALL ABSCESS: Primary | ICD-10-CM

## 2021-03-11 DIAGNOSIS — I10 HYPERTENSION GOAL BP (BLOOD PRESSURE) < 140/90: ICD-10-CM

## 2021-03-11 PROCEDURE — 99214 OFFICE O/P EST MOD 30 MIN: CPT | Performed by: INTERNAL MEDICINE

## 2021-03-11 RX ORDER — AMOXICILLIN AND CLAVULANATE POTASSIUM 500; 125 MG/1; MG/1
1 TABLET, FILM COATED ORAL 3 TIMES DAILY
Qty: 42 TABLET | Refills: 0 | Status: SHIPPED | OUTPATIENT
Start: 2021-03-11 | End: 2021-03-19

## 2021-03-11 ASSESSMENT — PAIN SCALES - GENERAL: PAINLEVEL: MODERATE PAIN (4)

## 2021-03-11 NOTE — PROGRESS NOTES
Glenys Perez is a 40 year old who presents for the following health issues     HPI       Chief Complaint   Patient presents with     Abscess     follow up abscess on lower belly, came back 1 week after antibiotics         EMR reviewed including:             Complaint, History of Chief Complaint, Corresponding Review of Systems, and Complaint Specific Physical Examination.    #1   Abdominal wall abscess.  Lower left abdomen.  Previously treated with antibiotics with good results.  Has recurred within the last week.  Some discharge and drainage. Spontaneously opened.       Exam: Abdominal wall is tender.  Abscess measures approximately 2 inches in diameter.  Has now drained.  Surrounding erythema approximately 4 inches total diameter.  No evidence of perforation or muscle wall involvement.    #2   Ongoing type 2 diabetes.  Recent A1c of 9.9.  Down from the previous 12.2 of last year.  Continues glipizide, and Farxiga.  Intolerant to Metformin.  Recalcitrant to the idea of to insulin therapy  Denies polyuria or polydipsia.  Denies symptoms of hypoglycemia.       Exam:   LUNGS: clear bilaterally, airflow is brisk, no intercostal retraction or stridor is noted. No coughing is noted during visit.   HEART:  regular without rubs, clicks, gallops, or murmurs. PMI is nondisplaced. Upstrokes are brisk. S1,S2 are heard.   NEURO: Pt is alert and appropriate. No neurologic lateralization is noted. Cranial nerves 2-12 are intact. Peripheral sensory and motor function are grossly normal.       #3   Ongoing hypertension  Controlled.  Compliant with medication.  Denies any headache or neurologic symptoms.  Denies chest pain or shortness of breath.       Exam:   Noted        Vital Signs:   /78 (BP Location: Right arm, Patient Position: Sitting, Cuff Size: Adult Large)   Pulse 96   Temp 97.3  F (36.3  C) (Temporal)   Resp 18   Wt 129.3 kg (285 lb)   SpO2 100%   BMI 47.43 kg/m               Decision  Making    Problem and Complexity     1. Abdominal wall abscess  We will start on Augmentin.  Anticipate 2-week course.  Urgent need for appropriate hygiene.  - amoxicillin-clavulanate (AUGMENTIN) 500-125 MG tablet; Take 1 tablet by mouth 3 times daily  Dispense: 42 tablet; Refill: 0    2. Type 2 diabetes mellitus with other specified complication, without long-term current use of insulin (H)  Continue medication.  Discussed the addition of oral GLP-1 agonist.  Patient hesitant $$  Discussed diabetic education and dietary support.  Patient too busy    3. Morbid obesity (H)  Recommend weight loss to responsible caloric restriction and exercise    4. Hypertension goal BP (blood pressure) < 140/90  Currently controlled.          ------------------------------------------------------------------------------------------------------------------------------  Data    4=1/3 5=2/3    1  >3  Specialty (external) notes reviewed:   Neurosurgical notes appreciated from previous coccydynia.  Resolved    Tests ordered (by provider) reviewed:   Reviewed with patient    Tests ordered (by provider):   None    Independent Historians Interview:   None    2  Review of outside (other providers) Tests:   None    3   Verbal Discussion with Specialists:    None      ----------------------------------------------------------------------------------------------------------------------------------  Risk   Prescription drug management:   Ongoing                                High risk for toxicity:     Decision regarding surgery:    None     Social determinants of health:   None (unless you count compliance)     Decision to withhold therapy:   None                               FOLLOW UP   I have asked the patient to make an appointment for followup with me in 2 weeks            I have carefully explained the diagnosis and treatment options to the patient.  The patient has displayed an understanding of the above, and all subsequent questions  were answered.      DO ROSE MARY Miller    Portions of this note were produced using [employer]  Although every attempt at real-time proof reading has been made, occasional grammar/syntax errors may have been missed.            Patient notes that he will soon be moving out of town as he has bought property in [place]

## 2021-03-19 ENCOUNTER — MYC MEDICAL ADVICE (OUTPATIENT)
Dept: INTERNAL MEDICINE | Facility: CLINIC | Age: 41
End: 2021-03-19

## 2021-03-19 DIAGNOSIS — L02.211 ABDOMINAL WALL ABSCESS: ICD-10-CM

## 2021-03-19 RX ORDER — AMOXICILLIN AND CLAVULANATE POTASSIUM 500; 125 MG/1; MG/1
1 TABLET, FILM COATED ORAL 3 TIMES DAILY
Qty: 42 TABLET | Refills: 0 | Status: SHIPPED | OUTPATIENT
Start: 2021-03-19 | End: 2021-04-29

## 2021-03-30 ENCOUNTER — MYC MEDICAL ADVICE (OUTPATIENT)
Dept: INTERNAL MEDICINE | Facility: CLINIC | Age: 41
End: 2021-03-30

## 2021-04-16 ENCOUNTER — MYC MEDICAL ADVICE (OUTPATIENT)
Dept: INTERNAL MEDICINE | Facility: CLINIC | Age: 41
End: 2021-04-16

## 2021-04-21 ENCOUNTER — MYC MEDICAL ADVICE (OUTPATIENT)
Dept: INTERNAL MEDICINE | Facility: CLINIC | Age: 41
End: 2021-04-21

## 2021-04-26 ENCOUNTER — MYC MEDICAL ADVICE (OUTPATIENT)
Dept: INTERNAL MEDICINE | Facility: CLINIC | Age: 41
End: 2021-04-26

## 2021-04-26 DIAGNOSIS — S16.1XXA ACUTE STRAIN OF NECK MUSCLE, INITIAL ENCOUNTER: ICD-10-CM

## 2021-04-26 DIAGNOSIS — S39.012A BACK STRAIN, INITIAL ENCOUNTER: ICD-10-CM

## 2021-04-26 DIAGNOSIS — E11.69 TYPE 2 DIABETES MELLITUS WITH OTHER SPECIFIED COMPLICATION, WITHOUT LONG-TERM CURRENT USE OF INSULIN (H): ICD-10-CM

## 2021-04-27 RX ORDER — METHOCARBAMOL 750 MG/1
750 TABLET, FILM COATED ORAL 2 TIMES DAILY PRN
Qty: 60 TABLET | Refills: 0 | Status: SHIPPED | OUTPATIENT
Start: 2021-04-27 | End: 2021-06-01

## 2021-04-27 RX ORDER — GLIPIZIDE 10 MG/1
TABLET ORAL
Qty: 90 TABLET | Refills: 1 | Status: SHIPPED | OUTPATIENT
Start: 2021-04-27 | End: 2021-06-03

## 2021-04-29 ENCOUNTER — OFFICE VISIT (OUTPATIENT)
Dept: INTERNAL MEDICINE | Facility: CLINIC | Age: 41
End: 2021-04-29
Payer: COMMERCIAL

## 2021-04-29 VITALS
RESPIRATION RATE: 18 BRPM | WEIGHT: 283 LBS | BODY MASS INDEX: 47.09 KG/M2 | SYSTOLIC BLOOD PRESSURE: 132 MMHG | DIASTOLIC BLOOD PRESSURE: 86 MMHG | OXYGEN SATURATION: 99 % | TEMPERATURE: 97.8 F | HEART RATE: 104 BPM

## 2021-04-29 DIAGNOSIS — E66.01 MORBID OBESITY (H): ICD-10-CM

## 2021-04-29 DIAGNOSIS — E11.69 TYPE 2 DIABETES MELLITUS WITH OTHER SPECIFIED COMPLICATION, WITHOUT LONG-TERM CURRENT USE OF INSULIN (H): ICD-10-CM

## 2021-04-29 DIAGNOSIS — L02.211 CUTANEOUS ABSCESS OF ABDOMINAL WALL: Primary | ICD-10-CM

## 2021-04-29 PROCEDURE — 99213 OFFICE O/P EST LOW 20 MIN: CPT | Performed by: INTERNAL MEDICINE

## 2021-04-29 RX ORDER — METRONIDAZOLE 500 MG/1
500 TABLET ORAL 3 TIMES DAILY
Qty: 21 TABLET | Refills: 0 | Status: SHIPPED | OUTPATIENT
Start: 2021-04-29 | End: 2021-05-06

## 2021-04-29 RX ORDER — LEVOFLOXACIN 500 MG/1
500 TABLET, FILM COATED ORAL DAILY
Qty: 7 TABLET | Refills: 0 | Status: SHIPPED | OUTPATIENT
Start: 2021-04-29 | End: 2021-12-13

## 2021-04-29 ASSESSMENT — PAIN SCALES - GENERAL: PAINLEVEL: SEVERE PAIN (6)

## 2021-04-29 NOTE — PROGRESS NOTES
Glenys Perez is a 41 year old who presents for the following health issues     HPI   Chief Complaint   Patient presents with     Abscess     rechesck, abd, worse           EMR reviewed including:             Complaint, History of Chief Complaint, Corresponding Review of Systems, and Complaint Specific Physical Examination.    #1   Follow-up on abdominal wound.  Continues to drain.  Lower left abdomen.  Has been on multiple antibiotics.  [place]  Unaware of which antibiotic or culture results.  Drainage continues to be somewhat purulent.  No significant odor        Exam:  3 inch diameter open skin wound.  It appears superficial.  No evidence of draining tract noted.      #2   Follow-up on diabetes.  Continue medication compliantly.  Recent hemoglobin A1c 9.9.  Patient much more aggressive with dietary control.  Denies hyper or hypoglycemic episodes.  .      Vital Signs:   /86 (BP Location: Right arm, Patient Position: Sitting, Cuff Size: Adult Large)   Pulse 104   Temp 97.8  F (36.6  C) (Temporal)   Resp 18   Wt 128.4 kg (283 lb)   SpO2 99%   BMI 47.09 kg/m               Decision Making    Problem and Complexity     1. Cutaneous abscess of abdominal wall  We will start on Levaquin and Flagyl.  Instructed no alcohol!  We will set up with wound specialist for assistance in care and management  - WOUND CARE REFERRAL; Future  - metroNIDAZOLE (FLAGYL) 500 MG tablet; Take 1 tablet (500 mg) by mouth 3 times daily for 7 days  Dispense: 21 tablet; Refill: 0  - levofloxacin (LEVAQUIN) 500 MG tablet; Take 1 tablet (500 mg) by mouth daily  Dispense: 7 tablet; Refill: 0    2. Type 2 diabetes mellitus with other specified complication, without long-term current use of insulin (H)  Continue current medications.  Continue current dietary restrictions.    3. Morbid obesity (H)  Recommend caloric restriction.  Discussed once again.        ------------------------------------------------------------------------------------------------------------------------------  Data    4=1/3 5=2/3    1  >3  Specialty (external) notes reviewed:   None  Individual tests ordered (by provider) reviewed:   Previous lab work reviewed with patient  Individual tests ordered (by provider):   None  Independent Historians Interview:   None  2  Review of outside (other providers) Tests:   None  3   Verbal Discussion with Specialists:    None    ----------------------------------------------------------------------------------------------------------------------------------  Risk   Prescription drug management:   Yes                                High risk for toxicity:   Decision regarding surgery:    None   Social determinants of health:   None   Decision regarding hospitalization:     None   Decision to withhold therapy:   None                               FOLLOW UP   I have asked the patient to make an appointment for followup with me in 1 to 2 weeks            I have carefully explained the diagnosis and treatment options to the patient.  The patient has displayed an understanding of the above, and all subsequent questions were answered.      DO ROSE MARY Miller    Portions of this note were produced using [employer]  Although every attempt at real-time proof reading has been made, occasional grammar/syntax errors may have been missed.

## 2021-05-04 ENCOUNTER — MYC MEDICAL ADVICE (OUTPATIENT)
Dept: INTERNAL MEDICINE | Facility: CLINIC | Age: 41
End: 2021-05-04

## 2021-05-04 NOTE — TELEPHONE ENCOUNTER
Katt sent due to patient only wanting a call if there were medication changes.     Nery Harper on 5/4/2021 at 1:33 PM

## 2021-05-04 NOTE — TELEPHONE ENCOUNTER
The patient should have both Levaquin and Flagyl for total 7 days.  He should continue both.    With respect to his diabetes, he should watch his diet and increase his fluid intake.  Anticipate elevation as result of the infection and medications.  Any attempt to adjust the oral medication might lead to rebound hyperglycemia.    If the skin wound is not appreciably improved upon completion of the antibiotics, I would recommend surgical consultation.    Please call him by phone as per his request.    Miley

## 2021-05-05 ENCOUNTER — MYC MEDICAL ADVICE (OUTPATIENT)
Dept: INTERNAL MEDICINE | Facility: CLINIC | Age: 41
End: 2021-05-05

## 2021-05-05 DIAGNOSIS — L02.211 ABDOMINAL WALL ABSCESS: Primary | ICD-10-CM

## 2021-05-07 NOTE — TELEPHONE ENCOUNTER
I have placed a referral for general surgery.  Please help patient set up a visit.    Thank you.    Miley

## 2021-05-08 ENCOUNTER — HEALTH MAINTENANCE LETTER (OUTPATIENT)
Age: 41
End: 2021-05-08

## 2021-05-10 ENCOUNTER — HOSPITAL ENCOUNTER (OUTPATIENT)
Dept: WOUND CARE | Facility: CLINIC | Age: 41
Discharge: HOME | End: 2021-05-10
Attending: INTERNAL MEDICINE | Admitting: INTERNAL MEDICINE
Payer: COMMERCIAL

## 2021-05-10 DIAGNOSIS — L02.211 CUTANEOUS ABSCESS OF ABDOMINAL WALL: ICD-10-CM

## 2021-05-10 PROCEDURE — G0463 HOSPITAL OUTPT CLINIC VISIT: HCPCS

## 2021-05-10 NOTE — DISCHARGE INSTRUCTIONS
Today the wound on your left lower abdomen has an unknown origin at this time.  I understand you have an appointment to see both surgery and dermatology.  Keep both of those appointments as we need to find the cause before it will fully heal.  Start the new wound cares with the [employer] material, use 1/2 pice to cover the area and then secure with the large band aids and change daily.    The scheduling line for the surgery clinic is [phone] and request Dr Bell.    Also keep the appointment with dermatology as they may have better testing or imaging ideas.    Your referral to me is good for many months to come so you can call the same number listed above.    Thanks for coming in today and contact me as directed by surgery or dermatology for ongoing wound cares.    Agusto Aragon RN [email or web address]

## 2021-05-10 NOTE — PROGRESS NOTES
Reason For Visit: Waqas Allen, 41 year old male, seen as outpatient to evaluate and treat a left abdominal wound wound. Referred by Dr Miley HUERTAS. Patient presents with his S.O Tyra today.      History: Pt reports he has had in the past skin lesions in his groin and back that come and went but normally resolved with minimal interventions.  However he developed a small site on her left lower abdomen many months ago that has not healed and the origin is unclear.  He has had multiple rounds of oral antibiotics for the wound but none have resolved the wound.  They have an appointment with dermatology in [place] next week and have a referral in to see surgery here at [facility] but no appointment has been set up yet.  His significant other has been caring for the wound with large band aids and occasional use of neosporin ointments.  The pt did seek cares for the wound in [place] about one month ago due to elevated oral temp of 103.5 and cultures were taken.  However I don's see any records of that visit within [facility] and pt and his S.O state they never received any documentation of results.  He reports he was started on an antibiotic at that time and was switched to a different oral antibiotic soon after.  Past medial history includes DM type 2.      Personal/social history: Pt lives independently with S.O.    Objective:   Physical appearance: alert and oriented  Current treatment plan: large band aids  Last changed: yesterday    Wound #1 Left lower abdomen on inferior aspect of the abdominal panus.  Stage/tissue depth: appears partial thickness but can not confirm  5 cm L x 11 cm W x 0.1 cm D  Tunneling: none noted  Undermining: none noted  Wound bed type/amount: See Assessment for details; is fluctuant  Wound Edges: open where there is open tissue exposed and depth  Periwound: wnl beyond the above measured areas with multiple scars and areas of hyperpigmentation  Drainage: moderate to large amounts  serosanguinous and white yellow purulent mixed  Odor: occasional odor per the pt and his S.O but not very strong of foul when noted  Pain: yes with any touch and friction    Dorsalis Pedal Pulse: not assessed this visit.  Posterior Tibial Pulse: not assessed this visit.  Hair growth: not assessed this visit.  Capillary Refill: not assessed this visit.  Feet/toes color: not assessed this visit.  Nails: not assessed this visit.  R Leg: Edema not assessed this visit.. Ankle circumference NA cm. Calf circumference NA cm.  L Leg: Edema not assessed this visit.. Ankle circumference NA cm. Calf circumference NA cm.    Mobility: appears wnl  Current offloading/footwear: regular shoes  Sensation: not assessed this visit.  HgbA1C: 9.9 Date: 2/2/21  Checks Blood Glucose?:  Checks weekly or less, Average Readings:  Varies but normally high.  Other callousing/areas of concern: none noted    Diet: Regular  Smoking: no    Discussed: etiology of wound (wound of unclear etiology), pathophysiology and patient specific goals for wound healing.   Education: On role of woc nurse in wound care team.  Need for proper diagnosis of the site before more comprehensive care plan can be made.  Need for pt to keep both scheduled appointment with dermatology and with yet to be scheduled appointment with Dr Bell for diagnostics.  Plan of care started today and rational for use but not a substitute for MD assessments soon.    Assessment:  On assessment the site is located on the inferior left lateral panus fold of the abdomen.  It does make contact with the tissue inferior to the site but is not in the deeper fold of the abdomen.  The site in total is mostly intact skin, hyperpigmented, slightly boggy to feel, with approximately 8 small open wounds.  The wounds are mostly small punctate dots 1 mm in diameter at the smallest with two measuring between 3 mm to 5 mm in diameter.  There is moderate amount of drainage on the dressing removed but in  pictures of the wound and dressing from the recent past drainage can be larger in volume.  Most of the drainage today is noted to be a mix of serosanguinous drainage and whitish yellow purulent drainage.  The drainage is most notably from the larger open wound while the punctate sites have smaller amount of serous and serosanguinous drainage noted when left open to the air.  The intact skin is very thin and loose but I would not describe it as blistered.  The pt's S.O is familiar with the term Hidradenitis Suppurative, from past medial visits related to this wound.  She reports he had a very large abscess in the groin in the past which did heal but took time and cares.  He has never been diagnosed with HS nor has any family members he knows but states his mother has multiple skin issues.  He also reports he has had multiple ingrown hair type wounds and cyst in the past but these healed with limited interventions.      Factors impacting wound healing:   Poor nutrition: inadequate supply of protein, carbohydrates, fatty acids, and trace elements essential for all phases of wound healing  Reduced Blood Supply: inadequate perfusion to heal wound  Medication: pt is not currently on any immunosuppressing medications  Chemotherapy: suppresses the immune system and inflammatory response, NA  Radiotherapy: increases production of free radical which damage cells, NA  Psychological stress: related to nonhealing painful wound site  Obesity: decreases tissue perfusion  Infection: prolongs inflammatory phase, uses vital nutrients, impairs epithelialization and releases toxins, pt has recently finished another round of antibiotics, started an antimicrobial dressing today.  Underlying Disease: diabetes mellitis present and elevated A1C  Maceration: reduces wound tensile strength and inhibits epithelial migration, none noted currently  Patient compliance, appears motivated to heal  Unrelieved pressure, intertriginous, treating with  use of soft dressing   Immobility, NA  Substance abuse: NA    Plan:  I do not know what type of wound the pt is suffering with.  The site is unstable and appears infected.  He has a referral in [facility] for surgical consult which I instructed him and his S.O to contact the Specialty scheduling line to set up appointment as soon as possible.  He also has an appointment scheduled with dermatology in [facility] for next week, instructions to be sure to keep that appointment.  Today for wound cares primary goal is to provide some antimicrobial element while keep the site more dry than damp.  His wife was taught wound cares with use of Silvercel to the site of irritation and drainage.  Wound care plan to be changed as needed after assessment and any diagnostic testing ordered by surgery and dermatology.  Follow up in the Wound and Ostomy clinic will be determined after pt is seen by surgery and dermatology.     Topical care: Silvercel as primary dressing  Offloading: NA  Additional recommendations: none at this time    Wound Care: Wound cleansed with saline and gauze, patted dry. Periwound protected with NA. Wound base filled/covered with Silvercel. Covered with NA, followed by NA. Secured with large island dressing. To be changed daily.    Discussed plan of care with patient and his S.O. Tyra. Teaching done with patient and his S.O. Tyra for dressing changes; pt is unable but his S.O. Tyra is able and willing to perform.    The following discharge instructions were reviewed with and sent home with the patient:  See AVS    The following supplies were sent home with the patient:  Additional Silvercel  Will reassess care plan in: TBD    Return visit: TBD after pt is seen by dermatology and surgery    Verbal, written, & demonstrative education provided.  Face to face time: approximately 40 minutes.  Procedure: VAMSI    [phone]

## 2021-05-28 ENCOUNTER — TRANSFERRED RECORDS (OUTPATIENT)
Dept: HEALTH INFORMATION MANAGEMENT | Facility: CLINIC | Age: 41
End: 2021-05-28
Payer: COMMERCIAL

## 2021-05-28 DIAGNOSIS — S39.012A BACK STRAIN, INITIAL ENCOUNTER: ICD-10-CM

## 2021-06-01 RX ORDER — METHOCARBAMOL 750 MG/1
750 TABLET, FILM COATED ORAL 2 TIMES DAILY PRN
Qty: 60 TABLET | Refills: 0 | Status: SHIPPED | OUTPATIENT
Start: 2021-06-01 | End: 2021-07-07

## 2021-06-01 NOTE — TELEPHONE ENCOUNTER
Requested Prescriptions   Pending Prescriptions Disp Refills     methocarbamol (ROBAXIN) 750 MG tablet [Pharmacy Med Name: METHOCARBAMOL 750MG TABLET] 60 tablet 0     Sig: TAKE 1 TABLET (750 MG) BY MOUTH 2 TIMES DAILY AS NEEDED FOR MUSCLE SPASMS     Last Written Prescription Date:  04/27/2021  Last Fill Quantity: 60,   # refills: 0  Last Office Visit: 04/29/2021  Future Office visit:       Routing refill request to provider for review/approval because:  Drug not on the [facility], [facility] or [facility] refill protocol or controlled substance  Adrienne Briscoe MA

## 2021-06-03 DIAGNOSIS — E11.69 TYPE 2 DIABETES MELLITUS WITH OTHER SPECIFIED COMPLICATION, WITHOUT LONG-TERM CURRENT USE OF INSULIN (H): ICD-10-CM

## 2021-06-03 RX ORDER — GLIPIZIDE 10 MG/1
TABLET ORAL
Qty: 90 TABLET | Refills: 0 | Status: SHIPPED | OUTPATIENT
Start: 2021-06-03 | End: 2021-07-07

## 2021-06-03 RX ORDER — DAPAGLIFLOZIN 5 MG/1
TABLET, FILM COATED ORAL
Qty: 90 TABLET | Refills: 0 | Status: SHIPPED | OUTPATIENT
Start: 2021-06-03 | End: 2021-09-02

## 2021-06-03 NOTE — TELEPHONE ENCOUNTER
"Glipizide  Last Written Prescription Date:  4/27/21  Last Fill Quantity: 90,  # refills: 1   Last office visit: 3/24/2020 with prescribing provider:  Miley Leong Office Visit:  NONE    Farxiga  Last Written Prescription Date:  3/5/21  Last Fill Quantity: 90,  # refills: 0   Last office visit: 3/24/2020 with prescribing provider:  Miley Leong Office Visit:  NONE  Requested Prescriptions   Pending Prescriptions Disp Refills     FARXIGA 5 MG TABS tablet [Pharmacy Med Name: Dapagliflozin Propanediol 5 MG OralTablet (Farxiga)] 90 tablet 0     Sig: TAKE 1 TABLET (5 MG) BY MOUTH DAILY       Sodium Glucose Co-Transport Inhibitor Agents Failed - 6/3/2021  1:22 AM        Failed - Patient has documented A1c within the specified period of time.     If HgbA1C is 8 or greater, it needs to be on file within the past 3 months.  If less than 8, must be on file within the past 6 months.     Recent Labs   Lab Test 02/02/21  1552   A1C 9.9*             Failed - No creatinine >1.4 or GFR <45 within the past 12 mos     Recent Labs   Lab Test 04/24/20 03/13/20  1344   GFRESTIMATED 81.8 >90   GFRESTBLACK  --  >90       Recent Labs   Lab Test 04/24/20   CR 1.070             Failed - Patient has documented normal Potassium within the last 12 mos.     Recent Labs   Lab Test 03/13/20  1344   POTASSIUM 3.7             Failed - Recent (6 mo) or future (30 days) visit within the authorizing provider's specialty     Patient had office visit in the last 6 months or has a visit in the next 30 days with authorizing provider or within the authorizing provider's specialty.  See \"Patient Info\" tab in inbasket, or \"Choose Columns\" in Meds & Orders section of the refill encounter.            Passed - Medication is active on med list        Passed - Patient is age 18 or older           glipiZIDE (GLUCOTROL) 10 MG tablet [Pharmacy Med Name: GLIPIZIDE 10MG TABLET] 90 tablet 0     Sig: TAKE 2 TABLETS (20MG) IN THE MORNING AND TAKE 1 TABLET (10MG) " "IN THEEVENING       Sulfonylurea Agents Failed - 6/3/2021  1:22 AM        Failed - Patient has documented A1c within the specified period of time.     If HgbA1C is 8 or greater, it needs to be on file within the past 3 months.  If less than 8, must be on file within the past 6 months.     Recent Labs   Lab Test 02/02/21  1552   A1C 9.9*           Failed - Patient has a recent creatinine (normal) within the past 12 mos.     Recent Labs   Lab Test 04/24/20   CR 1.070     Ok to refill medication if creatinine is low          Failed - Recent (6 mo) or future (30 days) visit within the authorizing provider's specialty     Patient had office visit in the last 6 months or has a visit in the next 30 days with authorizing provider or within the authorizing provider's specialty.  See \"Patient Info\" tab in inbasket, or \"Choose Columns\" in Meds & Orders section of the refill encounter.            Passed - Medication is active on med list        Passed - Patient is age 18 or older           Routing refill request to provider for review/approval because:   High Drug interaction warning ( when on Levofloxin)   Labs out of range:  See above   Labs not current:  See above  Patient needs to be seen because:  Pt was to Follow-up in 1 to 2 weeks after last visit  RYDER Bashir                          "

## 2021-06-07 DIAGNOSIS — S16.1XXA ACUTE STRAIN OF NECK MUSCLE, INITIAL ENCOUNTER: ICD-10-CM

## 2021-06-08 NOTE — TELEPHONE ENCOUNTER
Requested Prescriptions   Pending Prescriptions Disp Refills     tiZANidine (ZANAFLEX) 4 MG tablet [Pharmacy Med Name: TIZANIDINE 4MG TABLET] 30 tablet 0     Sig: TAKE 1 TABLET BY MOUTH DAILY AT BEDTIME     Last Written Prescription Date:  05/27/2021  Last Fill Quantity: 30,   # refills: 0  Last Office Visit: 04/29/2021  Future Office visit:       Routing refill request to provider for review/approval because:  Drug not on the G, P or [facility] refill protocol or controlled substance

## 2021-07-07 DIAGNOSIS — S39.012A BACK STRAIN, INITIAL ENCOUNTER: ICD-10-CM

## 2021-07-07 DIAGNOSIS — E11.69 TYPE 2 DIABETES MELLITUS WITH OTHER SPECIFIED COMPLICATION, WITHOUT LONG-TERM CURRENT USE OF INSULIN (H): ICD-10-CM

## 2021-07-07 RX ORDER — METHOCARBAMOL 750 MG/1
750 TABLET, FILM COATED ORAL 2 TIMES DAILY PRN
Qty: 60 TABLET | Refills: 0 | Status: SHIPPED | OUTPATIENT
Start: 2021-07-07 | End: 2021-09-14

## 2021-07-07 RX ORDER — GLIPIZIDE 10 MG/1
TABLET ORAL
Qty: 90 TABLET | Refills: 0 | Status: SHIPPED | OUTPATIENT
Start: 2021-07-07 | End: 2021-08-11

## 2021-07-07 NOTE — TELEPHONE ENCOUNTER
Pending Prescriptions:                       Disp   Refills    methocarbamol (ROBAXIN) 750 MG tablet [Pha*60 tab*0        Sig: TAKE 1 TABLET (750 MG) BY MOUTH 2 TIMES DAILY AS           NEEDED FOR MUSCLE SPASMS    Routing refill request to provider for review/approval because:  Drug not on the [facility] refill protocol   methocarbamol (ROBAXIN) 750 MG tablet 60 tablet 0 6/1/2021  No   Sig - Route: TAKE 1 TABLET (750 MG) BY MOUTH 2 TIMES DAILY AS NEEDED FOR MUSCLE SPASMS - Oral   Sent to pharmacy as: Methocarbamol 750 MG Oral Tablet (ROBAXIN)   Class: E-Prescribe   Order: [id number]   E-Prescribing Status: Receipt confirmed by pharmacy (6/1/2021 11:33 AM CDT)

## 2021-07-07 NOTE — TELEPHONE ENCOUNTER
Pending Prescriptions:                       Disp   Refills    glipiZIDE (GLUCOTROL) 10 MG tablet [Pharma*90 tab*0        Sig: TAKE 2 TABLETS (20MG) IN THE MORNING AND TAKE 1           TABLET (10MG) IN THEEVENING    Routing refill request to provider for review/approval because:  Imani given x1 and patient did not follow up, please advise

## 2021-07-12 ENCOUNTER — TRANSFERRED RECORDS (OUTPATIENT)
Dept: HEALTH INFORMATION MANAGEMENT | Facility: CLINIC | Age: 41
End: 2021-07-12
Payer: COMMERCIAL

## 2021-07-17 ENCOUNTER — TRANSFERRED RECORDS (OUTPATIENT)
Dept: HEALTH INFORMATION MANAGEMENT | Facility: CLINIC | Age: 41
End: 2021-07-17
Payer: COMMERCIAL

## 2021-08-02 DIAGNOSIS — L70.0 ACNE VULGARIS: ICD-10-CM

## 2021-08-02 DIAGNOSIS — I10 HYPERTENSION GOAL BP (BLOOD PRESSURE) < 140/90: ICD-10-CM

## 2021-08-03 ENCOUNTER — TRANSFERRED RECORDS (OUTPATIENT)
Dept: HEALTH INFORMATION MANAGEMENT | Facility: CLINIC | Age: 41
End: 2021-08-03
Payer: COMMERCIAL

## 2021-08-04 RX ORDER — LOSARTAN POTASSIUM 100 MG/1
TABLET ORAL
Qty: 90 TABLET | Refills: 1 | Status: SHIPPED | OUTPATIENT
Start: 2021-08-04 | End: 2022-03-01

## 2021-08-04 RX ORDER — MINOCYCLINE HYDROCHLORIDE 100 MG/1
100 CAPSULE ORAL 2 TIMES DAILY
Qty: 180 CAPSULE | Refills: 1 | Status: SHIPPED | OUTPATIENT
Start: 2021-08-04 | End: 2021-12-13

## 2021-08-04 NOTE — TELEPHONE ENCOUNTER
"Requested Prescriptions   Pending Prescriptions Disp Refills    minocycline (MINOCIN) 100 MG capsule [Pharmacy Med Name: MINOCYCLINE 100MG CAPSULE] 180 capsule 1     Sig: Take 1 capsule (100 mg) by mouth 2 times daily        Oral Acne/Rosacea Medications Protocol Failed - 8/2/2021  1:08 AM        Failed - Recent (12 mo) or future (30 days) visit within the authorizing provider's specialty     Patient has had an office visit with the authorizing provider or a provider within the authorizing providers department within the previous 12 mos or has a future within next 30 days. See \"Patient Info\" tab in inbasket, or \"Choose Columns\" in Meds & Orders section of the refill encounter.              Failed - Confirmation of diagnosis is required     Please confirm diagnosis is acne or rosacea.     If NOT acne or rosacea; refer request to provider for further evaluation.    If diagnosis IS acne or rosacea, OK to refill BASED ON PREVIOUS REFILL CLINICAL NOTE RECOMMENDATION.          Passed - Patient is 12 years of age or older        Passed - Medication is active on med list            Last Written Prescription Date:  3/1/2021  Last Fill Quantity: 180,  # refills: 1   Last office visit: Visit date not found with prescribing provider:  4/29/2021 Miley  Future Office Visit:          Routing refill request to provider for review/approval because:  Failed protocols        Saskia Damon RN  [facility]                 "

## 2021-08-04 NOTE — TELEPHONE ENCOUNTER
"Requested Prescriptions   Pending Prescriptions Disp Refills    losartan (COZAAR) 100 MG tablet [Pharmacy Med Name: LOSARTAN 100MG TABLET] 90 tablet 1     Sig: TAKE 1 TABLET BY MOUTH EVERY DAY        Angiotensin-II Receptors Failed - 8/2/2021  1:08 AM        Failed - Normal serum creatinine on file in past 12 months     Recent Labs   Lab Test 04/24/20  0000   CR 1.070       Ok to refill medication if creatinine is low          Failed - Normal serum potassium on file in past 12 months       Recent Labs   Lab Test 03/13/20  1344   POTASSIUM 3.7                    Passed - Last blood pressure under 140/90 in past 12 months       BP Readings from Last 3 Encounters:   04/29/21 132/86   03/11/21 132/78   01/26/21 127/75                 Passed - Recent (12 mo) or future (30 days) visit within the authorizing provider's specialty     Patient has had an office visit with the authorizing provider or a provider within the authorizing providers department within the previous 12 mos or has a future within next 30 days. See \"Patient Info\" tab in inbasket, or \"Choose Columns\" in Meds & Orders section of the refill encounter.              Passed - Medication is active on med list        Passed - Patient is age 18 or older            Last Written Prescription Date:  2/26/2021  Last Fill Quantity: 90,  # refills: 1   Last office visit: 4/29/2021 with prescribing provider:  Miley   Future Office Visit:          Routing refill request to provider for review/approval because:  Labs not current:  see above      Saskia Damon RN  [facility]                 "

## 2021-08-06 DIAGNOSIS — S16.1XXA ACUTE STRAIN OF NECK MUSCLE, INITIAL ENCOUNTER: ICD-10-CM

## 2021-08-06 NOTE — TELEPHONE ENCOUNTER
Routing refill request to provider for review/approval because:  Drug not on the [facility] refill protocol   Rayna Kaye RN, BSN   [facility]

## 2021-08-10 DIAGNOSIS — E11.69 TYPE 2 DIABETES MELLITUS WITH OTHER SPECIFIED COMPLICATION, WITHOUT LONG-TERM CURRENT USE OF INSULIN (H): ICD-10-CM

## 2021-08-11 RX ORDER — GLIPIZIDE 10 MG/1
TABLET ORAL
Qty: 90 TABLET | Refills: 0 | Status: SHIPPED | OUTPATIENT
Start: 2021-08-11 | End: 2021-09-14

## 2021-08-11 NOTE — TELEPHONE ENCOUNTER
Pending Prescriptions:                       Disp   Refills    glipiZIDE (GLUCOTROL) 10 MG tablet [Pharma*90 tab*0        Sig: TAKE 2 TABLETS (20MG) IN THE MORNING AND TAKE 1           TABLET (10MG) IN THEEVENING    Routing refill request to provider for review/approval because:  Labs out of range:  A1c  Lab Results   Component Value Date    A1C 9.9 02/02/2021    A1C 9.9 03/13/2020    A1C 12.2 01/24/2020    A1C 11.3 11/13/2019    A1C 9.9 07/10/2019       Labs not current:  Creatinine  Creatinine   Date Value Ref Range Status   04/24/2020 1.070 0.500 - 1.300 mg/dL Final

## 2021-08-22 DIAGNOSIS — S16.1XXA ACUTE STRAIN OF NECK MUSCLE, INITIAL ENCOUNTER: ICD-10-CM

## 2021-08-23 NOTE — TELEPHONE ENCOUNTER
Requested Prescriptions   Pending Prescriptions Disp Refills    tiZANidine (ZANAFLEX) 4 MG tablet [Pharmacy Med Name: TIZANIDINE 4MG TABLET] 30 tablet 0     Sig: TAKE 1 TABLET BY MOUTH DAILY AT BEDTIME        There is no refill protocol information for this order       Last Written Prescription Date:  8/6/2021  Last Fill Quantity: 30,  # refills: 0   Last office visit: 4/29/2021 with prescribing provider:  juana   Future Office Visit:            Routing refill request to provider for review/approval because:  Drug not on the [facility] refill protocol       Saskia Damon RN  [facility]

## 2021-08-25 NOTE — TELEPHONE ENCOUNTER
minocycline (MINOCIN/DYNACIN)        Last Written Prescription Date: 10/18/16  Last Fill Quantity: 60,  # refills: 3   Last Office Visit with G, P or [facility] prescribing provider: 1/18/17                                         Next 5 appointments (look out 90 days)     Feb 16, 2017  2:00 PM CST   [facility] with Watson Duckworth MD   [facility]    [address] [place] [id number] [phone]                       18

## 2021-08-28 ENCOUNTER — HEALTH MAINTENANCE LETTER (OUTPATIENT)
Age: 41
End: 2021-08-28

## 2021-09-01 DIAGNOSIS — E11.69 TYPE 2 DIABETES MELLITUS WITH OTHER SPECIFIED COMPLICATION, WITHOUT LONG-TERM CURRENT USE OF INSULIN (H): ICD-10-CM

## 2021-09-02 RX ORDER — DAPAGLIFLOZIN 5 MG/1
TABLET, FILM COATED ORAL
Qty: 90 TABLET | Refills: 0 | Status: SHIPPED | OUTPATIENT
Start: 2021-09-02 | End: 2021-12-13

## 2021-09-02 NOTE — TELEPHONE ENCOUNTER
"FARxiga  Requested Prescriptions   Pending Prescriptions Disp Refills     FARXIGA 5 MG TABS tablet [Pharmacy Med Name: FARXIGA 5MG TABLET] 90 tablet 0     Sig: TAKE 1 TABLET (5 MG) BY MOUTH DAILY       Sodium Glucose Co-Transport Inhibitor Agents Failed - 9/1/2021  1:13 AM        Failed - Patient has documented A1c within the specified period of time.     If HgbA1C is 8 or greater, it needs to be on file within the past 3 months.  If less than 8, must be on file within the past 6 months.     Recent Labs   Lab Test 02/02/21  1552   A1C 9.9*             Failed - No creatinine >1.4 or GFR <45 within the past 12 mos     Recent Labs   Lab Test 04/24/20  0000 03/13/20  1344   GFRESTIMATED 81.8 >90   GFRESTBLACK  --  >90       Recent Labs   Lab Test 04/24/20  0000   CR 1.070             Failed - Patient has documented normal Potassium within the last 12 mos.     Recent Labs   Lab Test 03/13/20  1344   POTASSIUM 3.7             Passed - Medication is active on med list        Passed - Patient is age 18 or older        Passed - Recent (6 mo) or future (30 days) visit within the authorizing provider's specialty     Patient had office visit in the last 6 months or has a visit in the next 30 days with authorizing provider or within the authorizing provider's specialty.  See \"Patient Info\" tab in inbasket, or \"Choose Columns\" in Meds & Orders section of the refill encounter.               Routing refill request to provider for review/approval because:  Imani given x1 and patient did not follow up, please advise  Labs out of range:  A1C = 9.9  Labs not current:  Last Cr+ check 4/24/20  RYDER Bashir          "

## 2021-09-10 DIAGNOSIS — S39.012A BACK STRAIN, INITIAL ENCOUNTER: ICD-10-CM

## 2021-09-11 DIAGNOSIS — S16.1XXA ACUTE STRAIN OF NECK MUSCLE, INITIAL ENCOUNTER: ICD-10-CM

## 2021-09-11 DIAGNOSIS — E11.69 TYPE 2 DIABETES MELLITUS WITH OTHER SPECIFIED COMPLICATION, WITHOUT LONG-TERM CURRENT USE OF INSULIN (H): ICD-10-CM

## 2021-09-14 RX ORDER — METHOCARBAMOL 750 MG/1
TABLET, FILM COATED ORAL
Qty: 60 TABLET | Refills: 0 | Status: SHIPPED | OUTPATIENT
Start: 2021-09-14 | End: 2021-10-27

## 2021-09-14 RX ORDER — GLIPIZIDE 10 MG/1
TABLET ORAL
Qty: 90 TABLET | Refills: 0 | Status: SHIPPED | OUTPATIENT
Start: 2021-09-14 | End: 2021-10-11

## 2021-09-14 NOTE — TELEPHONE ENCOUNTER
"Requested Prescriptions   Pending Prescriptions Disp Refills     glipiZIDE (GLUCOTROL) 10 MG tablet [Pharmacy Med Name: GLIPIZIDE 10MG TABLET] 90 tablet 0     Sig: TAKE 2 TABLETS (20MG) IN THE MORNING AND TAKE 1 TABLET (10MG) IN THE EVENING   4/29/2021    Sulfonylurea Agents Failed - 9/11/2021  1:16 AM        Failed - Patient has documented A1c within the specified period of time.     If HgbA1C is 8 or greater, it needs to be on file within the past 3 months.  If less than 8, must be on file within the past 6 months.     Recent Labs   Lab Test 02/02/21  1552   A1C 9.9*             Failed - Patient has a recent creatinine (normal) within the past 12 mos.     Recent Labs   Lab Test 04/24/20  0000   CR 1.070       Ok to refill medication if creatinine is low          Passed - Medication is active on med list        Passed - Patient is age 18 or older        Passed - Recent (6 mo) or future (30 days) visit within the authorizing provider's specialty     Patient had office visit in the last 6 months or has a visit in the next 30 days with authorizing provider or within the authorizing provider's specialty.  See \"Patient Info\" tab in inbasket, or \"Choose Columns\" in Meds & Orders section of the refill encounter.               tiZANidine (ZANAFLEX) 4 MG tablet [Pharmacy Med Name: TIZANIDINE 4MG TABLET] 30 tablet 0     Sig: TAKE 1 TABLET BY MOUTH DAILY AT BEDTIME       There is no refill protocol information for this order      Tori Salter RN    "

## 2021-09-14 NOTE — TELEPHONE ENCOUNTER
Robaxin      Last Written Prescription Date:  7/7/2021  Last Fill Quantity: 60,   # refills: 0  Last Office Visit: 4/29/2021  Future Office visit:       Routing refill request to provider for review/approval because:  Drug not on the [facility], [facility] or [facility] refill protocol or controlled substance

## 2021-09-14 NOTE — TELEPHONE ENCOUNTER
glipiZIDE (GLUCOTROL) 10 MG tablet [Pharmacy Med Name: GLIPIZIDE 10MG TABLET] 90 tablet 0    Sig: TAKE 2 TABLETS (20MG) IN THE MORNING AND TAKE 1 TABLET (10MG) IN THE EVENING   Routing refill request to provider for review/approval because:  Labs not current:  A1C last taken 2/2/2021 reading 9.9      tiZANidine (ZANAFLEX) 4 MG tablet [Pharmacy Med Name: TIZANIDINE 4MG TABLET] 30 tablet 0    Sig: TAKE 1 TABLET BY MOUTH DAILY AT BEDTIME   Routing refill request to provider for review/approval because:  Drug not on the [facility] refill protocol   Acute strain of neck muscle, initial encounter [S16.1XXA]     Tori Salter RN

## 2021-10-07 DIAGNOSIS — E11.69 TYPE 2 DIABETES MELLITUS WITH OTHER SPECIFIED COMPLICATION, WITHOUT LONG-TERM CURRENT USE OF INSULIN (H): ICD-10-CM

## 2021-10-08 DIAGNOSIS — S16.1XXA ACUTE STRAIN OF NECK MUSCLE, INITIAL ENCOUNTER: ICD-10-CM

## 2021-10-11 RX ORDER — GLIPIZIDE 10 MG/1
TABLET ORAL
Qty: 90 TABLET | Refills: 0 | Status: SHIPPED | OUTPATIENT
Start: 2021-10-11 | End: 2021-11-04

## 2021-10-11 NOTE — TELEPHONE ENCOUNTER
"Requested Prescriptions   Pending Prescriptions Disp Refills    glipiZIDE (GLUCOTROL) 10 MG tablet [Pharmacy Med Name: GLIPIZIDE 10MG TABLET] 90 tablet 0     Sig: TAKE 2 TABLETS (20MG) IN THE MORNING AND TAKE 1 TABLET (10MG) IN THE EVENING        Sulfonylurea Agents Failed - 10/7/2021 11:46 AM        Failed - Patient has documented A1c within the specified period of time.     If HgbA1C is 8 or greater, it needs to be on file within the past 3 months.  If less than 8, must be on file within the past 6 months.     Recent Labs   Lab Test 02/02/21  1552   A1C 9.9*             Failed - Patient has a recent creatinine (normal) within the past 12 mos.     Recent Labs   Lab Test 04/24/20  0000   CR 1.070       Ok to refill medication if creatinine is low          Passed - Medication is active on med list        Passed - Patient is age 18 or older        Passed - Recent (6 mo) or future (30 days) visit within the authorizing provider's specialty     Patient had office visit in the last 6 months or has a visit in the next 30 days with authorizing provider or within the authorizing provider's specialty.  See \"Patient Info\" tab in inbasket, or \"Choose Columns\" in Meds & Orders section of the refill encounter.                Routing refill request to provider for review/approval because:  Labs out of range:  see above  Labs not current:  see above  Patient needs to be seen because:  He is a diabetic and LOV 4/29/20  RYDER Bashir        "

## 2021-10-23 ENCOUNTER — HEALTH MAINTENANCE LETTER (OUTPATIENT)
Age: 41
End: 2021-10-23

## 2021-10-27 ENCOUNTER — TRANSCRIBE ORDERS (OUTPATIENT)
Dept: OTHER | Age: 41
End: 2021-10-27

## 2021-10-27 DIAGNOSIS — S39.012A BACK STRAIN, INITIAL ENCOUNTER: ICD-10-CM

## 2021-10-27 DIAGNOSIS — L02.93 CARBUNCLE: Primary | ICD-10-CM

## 2021-10-27 RX ORDER — METHOCARBAMOL 750 MG/1
TABLET, FILM COATED ORAL
Qty: 60 TABLET | Refills: 0 | Status: SHIPPED | OUTPATIENT
Start: 2021-10-27 | End: 2022-01-04

## 2021-10-27 NOTE — TELEPHONE ENCOUNTER
Robaxin      Last Written Prescription Date:  9/14/2021  Last Fill Quantity: 60,   # refills: 0  Last Office Visit: 4/29/2021  Future Office visit:       Routing refill request to provider for review/approval because:  Drug not on the [facility], P or [facility] refill protocol or controlled substance

## 2021-11-04 DIAGNOSIS — E11.69 TYPE 2 DIABETES MELLITUS WITH OTHER SPECIFIED COMPLICATION, WITHOUT LONG-TERM CURRENT USE OF INSULIN (H): ICD-10-CM

## 2021-11-04 RX ORDER — GLIPIZIDE 10 MG/1
TABLET ORAL
Qty: 90 TABLET | Refills: 0 | Status: SHIPPED | OUTPATIENT
Start: 2021-11-04 | End: 2021-12-03

## 2021-11-04 NOTE — TELEPHONE ENCOUNTER
Pending Prescriptions:                       Disp   Refills    glipiZIDE (GLUCOTROL) 10 MG tablet [Pharma*90 tab*0        Sig: TAKE 2 TABLETS (20MG) IN THE MORNING AND TAKE 1           TABLET (10MG) IN THE EVENING    Routing refill request to provider for review/approval because:  Labs not current:    Lab Results   Component Value Date    A1C 9.9 02/02/2021    A1C 9.9 03/13/2020    A1C 12.2 01/24/2020    A1C 11.3 11/13/2019    A1C 9.9 07/10/2019

## 2021-12-03 DIAGNOSIS — E11.69 TYPE 2 DIABETES MELLITUS WITH OTHER SPECIFIED COMPLICATION, WITHOUT LONG-TERM CURRENT USE OF INSULIN (H): ICD-10-CM

## 2021-12-03 DIAGNOSIS — S16.1XXA ACUTE STRAIN OF NECK MUSCLE, INITIAL ENCOUNTER: ICD-10-CM

## 2021-12-03 RX ORDER — GLIPIZIDE 10 MG/1
TABLET ORAL
Qty: 90 TABLET | Refills: 0 | Status: SHIPPED | OUTPATIENT
Start: 2021-12-03 | End: 2022-01-10

## 2021-12-03 NOTE — TELEPHONE ENCOUNTER
Glipizide  Routing refill request to provider for review/approval because:  Labs not current:  CRE, A1c    Zanaflex  Routing refill request to provider for review/approval because:  Drug not on the [facility] refill protocol       Antonio Jones RN

## 2021-12-06 NOTE — TELEPHONE ENCOUNTER
RECORDS RECEIVED FROM: External   DATE RECEIVED: 12.08.2021    NOTES (Gather within 2 years) STATUS DETAILS   OFFICE NOTE from referring provider   Received 10.27.2021   [facility]           OFFICE NOTE from other specialist N/A    DISCHARGE SUMMARY from hospital N/A    DISCHARGE REPORT from the ER N/A    LABS (any labs) Internal / CE    MEDICATION LIST Internal    IMAGING  (NEED IMAGES AND REPORTS)     Osteomyelitis: Foot imaging  N/A    Liver Abscess: Abdominal imaging N/A    Other (anything related to diagnoses N/A       Action 12.06.2021 RM   Action Taken Records sent to HIM to be uploaded to chart,

## 2021-12-08 ENCOUNTER — PRE VISIT (OUTPATIENT)
Dept: INFECTIOUS DISEASES | Facility: CLINIC | Age: 41
End: 2021-12-08
Payer: COMMERCIAL

## 2021-12-13 ENCOUNTER — OFFICE VISIT (OUTPATIENT)
Dept: SURGERY | Facility: CLINIC | Age: 41
End: 2021-12-13
Payer: COMMERCIAL

## 2021-12-13 VITALS
RESPIRATION RATE: 16 BRPM | WEIGHT: 283 LBS | HEIGHT: 65 IN | SYSTOLIC BLOOD PRESSURE: 156 MMHG | HEART RATE: 101 BPM | OXYGEN SATURATION: 95 % | BODY MASS INDEX: 47.15 KG/M2 | DIASTOLIC BLOOD PRESSURE: 96 MMHG

## 2021-12-13 DIAGNOSIS — L02.211 ABDOMINAL WALL ABSCESS: Primary | ICD-10-CM

## 2021-12-13 PROCEDURE — 99214 OFFICE O/P EST MOD 30 MIN: CPT | Performed by: SURGERY

## 2021-12-13 ASSESSMENT — MIFFLIN-ST. JEOR: SCORE: 2120.56

## 2021-12-13 NOTE — LETTER
"2021    RE: Waqas Allen, : 1980      This is a patient known to me from a previous visit regarding a ventral hernia.  The patient was scheduled for surgery a year and a half ago, but this was canceled around the beginning of the COVID-19 pandemic.  Shortly after that, the patient began to have some pain and drainage from his left lower abdominal wall.  He has been treated with multiple courses of antibiotics.  He recently saw a dermatologist who suggested visiting with a surgeon about possible surgical treatment for a \"carbuncle\".  The patient feels that his drainage was worse when he was on a recent antidiabetic medication.  He feels it has improved significantly since he stopped that.  He has also had some drainage in the perianal area.  He feels this may well connect into deeper tissues.  The patient has an appointment coming up with an infectious disease physician at the [facility] in January.     Past medical and surgical histories are reviewed.  Notably, the patient has diabetes which has historically been a challenge to control.     The patient has no known history of Crohn's disease.     Physical exam:  The patient is in no apparent distress.  Breathing is nonlabored.  The abdomen is obese.  In the left lower abdomen, on a relatively dependent portion of the pannus, there is a broad area approximately 4 x 12 cm with multiple draining sinuses.  The perianal region on the left also reveals a similar area with multiple draining sinuses.  This does appear to have a cord going down towards the anus.     Assessment and plan: This is a patient with an unusual picture of multiple draining sinuses in the abdominal wall.  This is a picture most consistent with hidradenitis suppurativa, though the location is unusual.  In addition, he has an area of drainage in the perianal region.  This is somewhat suspicious for a fistula in anal, though either Crohn's disease or hidradenitis suppurativa would " be possibilities.  Regarding the patient's perianal issue, I think he should see one of the colorectal surgeons.  I gave him contact information for them.  I think the patient might benefit from seeing one of the dermatologist at the [facility], possibly in conjunction with the upcoming infectious disease consultation, to see if they have any thoughts on nonsurgical treatment.  It would certainly be feasible to excise the area involved on the abdomen, though this would require a relatively large incision, and I have concerns about the patient's ability to heal this well.  There there has been some discussion as to whether avoidance of yeast such as Brewers yeast might help with healing hidradenitis.  I did suggest that the patient could try to cut this out of his diet.  At this point, I do not think there is any urgent reason to perform any sort of surgical debridement.  If, in the future, it seems that excision of the abdominal area is appropriate, I would certainly be happy to revisit that with the patient.  The patient will work on getting an appointment with a dermatologist at the [facility] and will follow up with the infectious disease specialist there as well.        Gabo Cintron MD  [facility]

## 2021-12-18 ENCOUNTER — HEALTH MAINTENANCE LETTER (OUTPATIENT)
Age: 41
End: 2021-12-18

## 2022-01-04 DIAGNOSIS — S39.012A BACK STRAIN, INITIAL ENCOUNTER: ICD-10-CM

## 2022-01-04 RX ORDER — METHOCARBAMOL 750 MG/1
TABLET, FILM COATED ORAL
Qty: 60 TABLET | Refills: 0 | Status: SHIPPED | OUTPATIENT
Start: 2022-01-04 | End: 2022-02-21

## 2022-01-04 NOTE — TELEPHONE ENCOUNTER
Robaxin      Last Written Prescription Date:  10/27/2021  Last Fill Quantity: 60,   # refills: 0  Last Office Visit: 4/29/2021  Future Office visit:       Routing refill request to provider for review/approval because:  Drug not on the [facility], P or [facility] refill protocol or controlled substance

## 2022-01-07 DIAGNOSIS — E11.69 TYPE 2 DIABETES MELLITUS WITH OTHER SPECIFIED COMPLICATION, WITHOUT LONG-TERM CURRENT USE OF INSULIN (H): ICD-10-CM

## 2022-01-07 DIAGNOSIS — S16.1XXA ACUTE STRAIN OF NECK MUSCLE, INITIAL ENCOUNTER: ICD-10-CM

## 2022-01-10 RX ORDER — GLIPIZIDE 10 MG/1
TABLET ORAL
Qty: 90 TABLET | Refills: 0 | Status: SHIPPED | OUTPATIENT
Start: 2022-01-10 | End: 2022-02-08

## 2022-01-10 NOTE — TELEPHONE ENCOUNTER
Tizanidine  Routing refill request to provider for review/approval because:  Drug not on the [facility] refill protocol     Glipizide:  Routing refill request to provider for review/approval because:  Labs not current:  Creatinine, a1c  Needs OV.    Helen Webber RN

## 2022-01-31 ENCOUNTER — ANCILLARY PROCEDURE (OUTPATIENT)
Dept: GENERAL RADIOLOGY | Facility: CLINIC | Age: 42
End: 2022-01-31
Attending: PHYSICIAN ASSISTANT
Payer: COMMERCIAL

## 2022-01-31 ENCOUNTER — OFFICE VISIT (OUTPATIENT)
Dept: URGENT CARE | Facility: URGENT CARE | Age: 42
End: 2022-01-31
Payer: COMMERCIAL

## 2022-01-31 VITALS
RESPIRATION RATE: 20 BRPM | HEART RATE: 94 BPM | TEMPERATURE: 99.8 F | SYSTOLIC BLOOD PRESSURE: 130 MMHG | DIASTOLIC BLOOD PRESSURE: 82 MMHG | OXYGEN SATURATION: 98 %

## 2022-01-31 DIAGNOSIS — R19.7 DIARRHEA, UNSPECIFIED TYPE: ICD-10-CM

## 2022-01-31 DIAGNOSIS — R05.9 COUGH: ICD-10-CM

## 2022-01-31 DIAGNOSIS — E11.69 TYPE 2 DIABETES MELLITUS WITH OTHER SPECIFIED COMPLICATION, WITHOUT LONG-TERM CURRENT USE OF INSULIN (H): ICD-10-CM

## 2022-01-31 DIAGNOSIS — R11.0 NAUSEA: ICD-10-CM

## 2022-01-31 DIAGNOSIS — J18.9 PNEUMONIA OF BOTH LUNGS DUE TO INFECTIOUS ORGANISM, UNSPECIFIED PART OF LUNG: Primary | ICD-10-CM

## 2022-01-31 PROCEDURE — 71046 X-RAY EXAM CHEST 2 VIEWS: CPT | Performed by: RADIOLOGY

## 2022-01-31 PROCEDURE — 99214 OFFICE O/P EST MOD 30 MIN: CPT | Performed by: PHYSICIAN ASSISTANT

## 2022-01-31 RX ORDER — ONDANSETRON 4 MG/1
4 TABLET, ORALLY DISINTEGRATING ORAL EVERY 8 HOURS PRN
Qty: 20 TABLET | Refills: 0 | Status: SHIPPED | OUTPATIENT
Start: 2022-01-31 | End: 2022-11-11

## 2022-01-31 RX ORDER — ALBUTEROL SULFATE 90 UG/1
AEROSOL, METERED RESPIRATORY (INHALATION)
Qty: 18 G | Refills: 0 | Status: SHIPPED | OUTPATIENT
Start: 2022-01-31 | End: 2022-11-11

## 2022-01-31 RX ORDER — DOXYCYCLINE 100 MG/1
100 CAPSULE ORAL 2 TIMES DAILY
Qty: 20 CAPSULE | Refills: 0 | Status: SHIPPED | OUTPATIENT
Start: 2022-01-31 | End: 2022-02-10

## 2022-01-31 RX ORDER — PREDNISONE 20 MG/1
20 TABLET ORAL DAILY
Qty: 5 TABLET | Refills: 0 | Status: SHIPPED | OUTPATIENT
Start: 2022-01-31 | End: 2022-02-05

## 2022-01-31 NOTE — PROGRESS NOTES
"  Assessment & Plan     Pneumonia of both lungs due to infectious organism, unspecified part of lung  Chest x-ray suspicious for COVID pneumonia. Patient declines testing at this time. Will treat with doxycycline monohydrate (MONODOX) 100 MG capsule; Take 1 capsule (100 mg) by mouth 2 times daily for 10 days,  predniSONE (DELTASONE) 20 MG tablet; Take 1 tablet (20 mg) by mouth daily for 5 days, and albuterol (PROAIR HFA/PROVENTIL HFA/VENTOLIN HFA) 108 (90 Base) MCG/ACT inhaler; Inhale 2 puffs every 4-6 hours as needed for cough, wheezing, or shortness of breath. Discussed how to take these medications and what to expect.  Discussed in detail symptoms that would warrant emergent evaluation in the ED. Patient agrees with plan and will follow up as needed.      Cough    - XR Chest 2 Views; Future    Diarrhea, unspecified type  Stool work-up pending. Get plenty of rest and push fluids.      - Clostridium difficile Toxin B PCR; Future  - Enteric Bacteria and Virus Panel by [facility] Stool; Future  - Cryptosporidium/Giardia Immunoassay; Future    Type 2 diabetes mellitus with other specified complication, without long-term current use of insulin (H)  Monitor blood sugar and carbohydrate intake closely when sick or not feeling well. Patient is due for diabetic follow up.     Nausea    - ondansetron (ZOFRAN-ODT) 4 MG ODT tab; Take 1 tablet (4 mg) by mouth every 8 hours as needed for nausea        {Provider  Link to [facility] Help Grid :250686}     BMI:   Estimated body mass index is 47.09 kg/m  as calculated from the following:    Height as of 12/13/21: 1.651 m (5' 5\").    Weight as of 12/13/21: 128.4 kg (283 lb).           No follow-ups on file.    GRU[facility]              Subjective   Chief Complaint   Patient presents with     Cough     x 2 weeks, chest congestion, dizzy, low grade fever, nausea, diarrhea, loss of appetite       HPI     Cough     Onset of symptoms was 2 week(s) " ago.  Course of illness is same.    Severity moderate  Current and Associated symptoms: cough, diarrhea x 3 weeks, fever 100.6 last night   Treatment measures tried include Tylenol/Ibuprofen, peptobismal, dayquil/nightquil  Predisposing factors include None.  Has not had COVID in house, has not been vaccinated              Review of Systems   Constitutional, HEENT, cardiovascular, pulmonary, gi and gu systems are negative, except as otherwise noted.      Objective    /82 (BP Location: Right arm, Patient Position: Sitting, Cuff Size: Adult Large)   Pulse 94   Temp 99.8  F (37.7  C) (Tympanic)   Resp 20   SpO2 98%   There is no height or weight on file to calculate BMI.     Physical Exam  Constitutional:       General: He is not in acute distress.     Appearance: He is well-developed.   HENT:      Head: Normocephalic and atraumatic.      Right Ear: Tympanic membrane and ear canal normal.      Left Ear: Tympanic membrane and ear canal normal.   Eyes:      Conjunctiva/sclera: Conjunctivae normal.   Cardiovascular:      Rate and Rhythm: Normal rate and regular rhythm.   Pulmonary:      Effort: Pulmonary effort is normal.      Breath sounds: Normal breath sounds.   Skin:     General: Skin is warm and dry.      Findings: No rash.   Psychiatric:         Behavior: Behavior normal.

## 2022-02-06 DIAGNOSIS — E11.69 TYPE 2 DIABETES MELLITUS WITH OTHER SPECIFIED COMPLICATION, WITHOUT LONG-TERM CURRENT USE OF INSULIN (H): ICD-10-CM

## 2022-02-08 RX ORDER — GLIPIZIDE 10 MG/1
TABLET ORAL
Qty: 90 TABLET | Refills: 0 | Status: SHIPPED | OUTPATIENT
Start: 2022-02-08 | End: 2022-03-04

## 2022-02-08 NOTE — TELEPHONE ENCOUNTER
Pending Prescriptions:                       Disp   Refills    glipiZIDE (GLUCOTROL) 10 MG tablet [Pharma*90 tab*0        Sig: TAKE 2 TABLETS (20MG) IN THE MORNING AND TAKE 1           TABLET (10MG) IN THE EVENING    Routing refill request to provider for review/approval because:  Sulfonylurea Agents Failed 02/06/2022 05:01 AM   Protocol Details  Patient has documented A1c within the specified period of time.    Patient has a recent creatinine (normal) within the past 12 mos.    Recent (6 mo) or future (30 days) visit within the authorizing provider's specialty     Hemoglobin A1C POCT   Date Value Ref Range Status   02/02/2021 9.9 (H) 0 - 5.6 % Final     Comment:     Normal <5.7% Prediabetes 5.7-6.4%  Diabetes 6.5% or higher - adopted from ADA   consensus guidelines.       Creatinine   Date Value Ref Range Status   04/24/2020 1.070 0.500 - 1.300 mg/dL Final

## 2022-02-12 ENCOUNTER — HEALTH MAINTENANCE LETTER (OUTPATIENT)
Age: 42
End: 2022-02-12

## 2022-02-19 DIAGNOSIS — S39.012A BACK STRAIN, INITIAL ENCOUNTER: ICD-10-CM

## 2022-02-21 RX ORDER — METHOCARBAMOL 750 MG/1
TABLET, FILM COATED ORAL
Qty: 60 TABLET | Refills: 0 | Status: SHIPPED | OUTPATIENT
Start: 2022-02-21 | End: 2022-04-20

## 2022-02-21 NOTE — TELEPHONE ENCOUNTER
Robaxin      Last Written Prescription Date:  1/4/2022  Last Fill Quantity: 60,   # refills: 0  Last Office Visit: 4/29/2021  Future Office visit:       Routing refill request to provider for review/approval because:  Drug not on the [facility], P or [facility] refill protocol or controlled substance

## 2022-02-28 DIAGNOSIS — I10 HYPERTENSION GOAL BP (BLOOD PRESSURE) < 140/90: ICD-10-CM

## 2022-03-01 RX ORDER — LOSARTAN POTASSIUM 100 MG/1
TABLET ORAL
Qty: 90 TABLET | Refills: 1 | Status: SHIPPED | OUTPATIENT
Start: 2022-03-01 | End: 2022-08-30

## 2022-03-01 NOTE — TELEPHONE ENCOUNTER
Pending Prescriptions:                       Disp   Refills    losartan (COZAAR) 100 MG tablet [Pharmacy *90 tab*1        Sig: TAKE 1 TABLET BY MOUTH EVERY DAY    Routing refill request to provider for review/approval because:  Labs not current:  CR, potassium    Tori Salter RN

## 2022-03-04 DIAGNOSIS — E11.69 TYPE 2 DIABETES MELLITUS WITH OTHER SPECIFIED COMPLICATION, WITHOUT LONG-TERM CURRENT USE OF INSULIN (H): ICD-10-CM

## 2022-03-04 DIAGNOSIS — S16.1XXA ACUTE STRAIN OF NECK MUSCLE, INITIAL ENCOUNTER: ICD-10-CM

## 2022-03-04 RX ORDER — GLIPIZIDE 10 MG/1
TABLET ORAL
Qty: 90 TABLET | Refills: 0 | Status: SHIPPED | OUTPATIENT
Start: 2022-03-04 | End: 2022-04-05

## 2022-03-04 NOTE — TELEPHONE ENCOUNTER
Glipizide  Routing refill request to provider for review/approval because:  Labs not current:  A1C, CR    Tizanidine  Routing refill request to provider for review/approval because:  Drug not on the [facility] refill protocol     Tori Salter RN

## 2022-03-28 ENCOUNTER — LAB REQUISITION (OUTPATIENT)
Dept: LAB | Facility: CLINIC | Age: 42
End: 2022-03-28
Payer: COMMERCIAL

## 2022-03-28 ENCOUNTER — TRANSFERRED RECORDS (OUTPATIENT)
Dept: SURGERY | Facility: CLINIC | Age: 42
End: 2022-03-28

## 2022-03-28 DIAGNOSIS — L02.31 CUTANEOUS ABSCESS OF BUTTOCK: ICD-10-CM

## 2022-03-28 PROCEDURE — 87077 CULTURE AEROBIC IDENTIFY: CPT | Mod: ORL | Performed by: COLON & RECTAL SURGERY

## 2022-03-28 PROCEDURE — 87185 SC STD ENZYME DETCJ PER NZM: CPT | Mod: ORL | Performed by: COLON & RECTAL SURGERY

## 2022-03-28 PROCEDURE — 87070 CULTURE OTHR SPECIMN AEROBIC: CPT | Mod: ORL | Performed by: COLON & RECTAL SURGERY

## 2022-03-29 ENCOUNTER — HOSPITAL ENCOUNTER (OUTPATIENT)
Facility: CLINIC | Age: 42
End: 2022-03-29
Attending: COLON & RECTAL SURGERY | Admitting: COLON & RECTAL SURGERY
Payer: COMMERCIAL

## 2022-03-31 LAB
BACTERIA ABSC ANAEROBE+AEROBE CULT: ABNORMAL
BACTERIA ABSC ANAEROBE+AEROBE CULT: ABNORMAL

## 2022-04-01 LAB
BACTERIA ABSC ANAEROBE+AEROBE CULT: ABNORMAL
BACTERIA ABSC ANAEROBE+AEROBE CULT: ABNORMAL

## 2022-04-03 DIAGNOSIS — E11.69 TYPE 2 DIABETES MELLITUS WITH OTHER SPECIFIED COMPLICATION, WITHOUT LONG-TERM CURRENT USE OF INSULIN (H): ICD-10-CM

## 2022-04-04 NOTE — TELEPHONE ENCOUNTER
Routing refill request to provider for review/approval because:  Labs not current:  A1C, CRE  Patient needs to be seen because:  6 month visit due      Antonio Jones RN

## 2022-04-05 RX ORDER — GLIPIZIDE 10 MG/1
TABLET ORAL
Qty: 90 TABLET | Refills: 0 | Status: SHIPPED | OUTPATIENT
Start: 2022-04-05 | End: 2022-05-10

## 2022-04-06 NOTE — TELEPHONE ENCOUNTER
"Waqas Allen is a 37 year old male who presents with abdominal pain.    NURSING ASSESSMENT:  Description:  Pt was at this clinic with wife's OB appt.  He wanted to try to be seen by a provider due to stomach pain since Friday.    Onset/duration:  Friday night, woke up with epigastric pain  Precip. factors:  none  Associated symptoms:  Intermittent abdominal pain.  Denies chest pain, difficulty breathing, vomiting, light headedness, bloody or black stools, fever, tender to touch.  Improves/worsens symptoms:  It gets better or worse without eating or doing anything.   Pain scale (0-10)   4/10, but has gotten to an 8/10    Allergies:   Allergies   Allergen Reactions     Anaprox [Naproxen Sodium]      Nausea/vomiting     Darvocet [Propoxyphene N-Apap] Other (See Comments)     \"felt like I had a hangover\".  Patient tolerates Tylenol/Acetaminophen     Hctz [Hydrochlorothiazide] Other (See Comments)     impotence     Indocin [Indomethacin] GI Disturbance     Tramadol GI Disturbance       RECOMMENDED DISPOSITION:  See in 24 hours - due to pain not going away.  Scheduled with provider in [place] tomorrow.  Advised to go to the emergency room if pain gets to the 8/10 again.  Will comply with recommendation: Yes  If further questions/concerns or if symptoms do not improve, worsen or new symptoms develop, call your PCP or [facility] as soon as possible.      Guideline used: abdominal pain, adult  Telephone Triage Protocols for Nurses, Fifth Edition, Sharron Bermudez RN    " Subjective   History of Present Illness    Chief Complaint: Abdominal pain vomiting  History of Present Illness: 9-year-old female presents with above complaint diffuse abdominal pain associated nausea vomiting over the last few days.  Onset: 2 to 3 days  Duration: Persistent  Exacerbating / Alleviating factors: Worse with p.o. intake  Associated symptoms: None      Nurses Notes reviewed and agree, including vitals, allergies, social history and prior medical history.     REVIEW OF SYSTEMS: All systems reviewed and not pertinent unless noted.    Positive for: Diffuse abdominal cramping, vomiting    Negative for: Fever flank pain urinary symptoms constipation diarrhea GI bleeding, localized right lower quadrant tenderness  Review of Systems    Past Medical History:   Diagnosis Date   • Allergic rhinitis    • Asthma    • Eczema        No Known Allergies    Past Surgical History:   Procedure Laterality Date   • ADENOIDECTOMY     • MOUTH SURGERY         Family History   Problem Relation Age of Onset   • No Known Problems Mother    • No Known Problems Father        Social History     Socioeconomic History   • Marital status: Single   Tobacco Use   • Smoking status: Never Smoker   • Smokeless tobacco: Never Used           Objective   Physical Exam    CONSTITUTIONAL: Well developed, healthy appearing nontoxic 9-year-old female,  in no acute distress.  VITAL SIGNS: per nursing, reviewed and noted  SKIN: exposed skin with no rashes, ulcerations or petechiae.  EYES: perrla. EOMI.  ENT: Normal voice.  Moist mucous membranes.  No posterior pharyngeal erythema or exudate  RESPIRATORY:  No increased work of breathing. No retractions.   CARDIOVASCULAR: Extremities pink and warm.  Good Peripheral pulses. Good cap refill to extremities.   GI: Abdomen soft, mild nonspecific diffuse tenderness without rebound tenderness or guarding, normal bowel sounds. No hernia. No ascites.  MUSCULOSKELETAL:  Strength and tone grossly normal.  no  spasms. no neck or back tenderness or spasm.   NEUROLOGIC: Alert, oriented x 3. No gross deficits. GCS 15.   PSYCH: appropriate affect.  : no bladder tenderness or distention, no CVA tenderness      Procedures     No attending physician procedures were performed on this patient.      ED Course  ED Course as of 04/06/22 0336 Wed Apr 06, 2022   0335 Influenza A Ag, EIA: Negative [PF]   0335 Influenza B Ag, EIA: Negative [PF]   0335 Methodology:: Manual Light Microscopy [PF]   0335 Hyaline Casts, UA: None Seen [PF]   0335 Squamous Epithelial Cells, UA: 0-2 [PF]   0335 Bacteria, UA: None Seen [PF]   0335 WBC, UA(!): 3-5 [PF]   0335 RBC, UA: None Seen [PF]   0335 Urobilinogen, UA: 1.0 E.U./dL [PF]   0335 Nitrite, UA: Negative [PF]   0335 Leukocytes, UA(!): Trace [PF]   0335 Protein, UA: Negative [PF]   0335 Blood, UA: Negative [PF]   0335 Bilirubin, UA: Negative [PF]   0335 Ketones, UA(!): 15 mg/dL (1+) [PF]   0335 Glucose: Negative [PF]   0335 Specific Gravity, UA: 1.020 [PF]   0335 pH, UA(!): 8.5 [PF]   0335 Appearance, UA: Clear [PF]   0335 Color, UA: Yellow [PF]   0335    INDICATION:  Vomiting     COMPARISON:  2014.     FINDINGS:  A supine view of the abdomen was obtained.  The bowel gas  pattern is unremarkable.  There are no pathologic calcifications.  No  acute osseous abnormality is identified.     IMPRESSION:  No radiographic evidence of acute intraabdominal  abnormality.     This report was signed and finalized on 3/20/2022 9:43 AM by Miya Scott MD.          Specimen Collected: 03/20/22 09:42 Last Resulted: 03/20/22 09:43         [PF]      ED Course User Index  [PF] Jorge Bridges, DO                                                 MDM  9-year-old female present with above complaint.  Obtained UA to rule out obvious UTI.  Flu negative.  Obtained abdominal plain film to assess for any stool burden.  No acute findings on abdominal x-ray.  And no acute surgical findings on exam.  Low suspicion for  appendicitis patient discharged home stable condition with Bentyl and Zofran.  Outpatient follow-up precautions discussed.  Final diagnoses:   Diffuse abdominal pain   Non-intractable vomiting with nausea, unspecified vomiting type       ED Disposition  ED Disposition     ED Disposition   Discharge    Condition   Stable    Comment   --             Daly Dyer MD  [address] [facility] [address] [phone]          [facility] Emergency Department  [address] [facility] [address] [phone]    As needed, If symptoms worsen         Medication List      New Prescriptions    dicyclomine 10 MG capsule  Commonly known as: BENTYL  Take 1 capsule by mouth 4 (Four) Times a Day As Needed (abdominal cramps).     ondansetron ODT 4 MG disintegrating tablet  Commonly known as: ZOFRAN-ODT  Place 1 tablet on the tongue Every 8 (Eight) Hours As Needed for Nausea.           Where to Get Your Medications      These medications were sent to [id number] - [place], [place] - [address] AT [facility] [address] - [phone] PH - [phone] FX  [address] [facility] [address]    Phone: [phone]   · dicyclomine 10 MG capsule  · ondansetron ODT 4 MG disintegrating tablet          Jorge Bridges DO  04/06/22 0336

## 2022-04-09 ENCOUNTER — HEALTH MAINTENANCE LETTER (OUTPATIENT)
Age: 42
End: 2022-04-09

## 2022-04-09 DIAGNOSIS — Z11.59 ENCOUNTER FOR SCREENING FOR OTHER VIRAL DISEASES: Primary | ICD-10-CM

## 2022-04-19 DIAGNOSIS — S39.012A BACK STRAIN, INITIAL ENCOUNTER: ICD-10-CM

## 2022-04-20 RX ORDER — METHOCARBAMOL 750 MG/1
TABLET, FILM COATED ORAL
Qty: 60 TABLET | Refills: 0 | Status: SHIPPED | OUTPATIENT
Start: 2022-04-20 | End: 2022-07-11

## 2022-04-20 NOTE — TELEPHONE ENCOUNTER
Robaxin      Last Written Prescription Date:  2/21/2022  Last Fill Quantity: 60,   # refills: 0  Last Office Visit: 4/29/2021  Future Office visit:       Routing refill request to provider for review/approval because:  Drug not on the [facility], P or [facility] refill protocol or controlled substance

## 2022-04-25 NOTE — BRIEF OP NOTE
[facility] Orthopedic Brief Operative Note    Pre-operative diagnosis: right ac joint arthroscopy   Post-operative diagnosis: Same   Procedure: Procedure(s):  ARTHROSCOPY SHOULDER DISTAL CLAVICLE RESECTION  ARTHROSCOPY SHOULDER DECOMPRESSION   Surgeon: Watson Duckworth MD   Assistant(s): Jose Daniel Mauricio   Anesthesia: General endotracheal anesthesia and Regional   Estimated blood loss: Less than 50 ml   Total IV fluids: (See anesthesia record)   Drains: None   Specimens: None   Implants: See op note   Findings:    Complications: None   Weight bearing status: Non-weight bearing   Comments: See dictated operative report for full details          Spoke w/ pt. Rescheduled appt tomorrow.

## 2022-05-03 DIAGNOSIS — S16.1XXA ACUTE STRAIN OF NECK MUSCLE, INITIAL ENCOUNTER: ICD-10-CM

## 2022-05-04 NOTE — TELEPHONE ENCOUNTER
Zanaflex      Last Written Prescription Date:  3/4/2022  Last Fill Quantity: 30,   # refills: 0  Last Office Visit: 4/29/2021  Future Office visit:       Routing refill request to provider for review/approval because:  Drug not on the [facility], [facility] or [facility] refill protocol or controlled substance

## 2022-05-06 DIAGNOSIS — E11.69 TYPE 2 DIABETES MELLITUS WITH OTHER SPECIFIED COMPLICATION, WITHOUT LONG-TERM CURRENT USE OF INSULIN (H): ICD-10-CM

## 2022-05-09 NOTE — TELEPHONE ENCOUNTER
Pending Prescriptions:                       Disp   Refills    glipiZIDE (GLUCOTROL) 10 MG tablet [Pharma*90 tab*0        Sig: TAKE 2 TABLETS (20MG) IN THE MORNING AND TAKE 1           TABLET (10MG) IN THE EVENING    Routing refill request to provider for review/approval because:  Labs not current:  A1C, CR    Tori Salter RN

## 2022-05-10 RX ORDER — GLIPIZIDE 10 MG/1
TABLET ORAL
Qty: 90 TABLET | Refills: 0 | Status: SHIPPED | OUTPATIENT
Start: 2022-05-10 | End: 2022-06-06

## 2022-06-02 DIAGNOSIS — S16.1XXA ACUTE STRAIN OF NECK MUSCLE, INITIAL ENCOUNTER: ICD-10-CM

## 2022-06-02 DIAGNOSIS — E11.69 TYPE 2 DIABETES MELLITUS WITH OTHER SPECIFIED COMPLICATION, WITHOUT LONG-TERM CURRENT USE OF INSULIN (H): ICD-10-CM

## 2022-06-06 RX ORDER — GLIPIZIDE 10 MG/1
TABLET ORAL
Qty: 90 TABLET | Refills: 0 | Status: SHIPPED | OUTPATIENT
Start: 2022-06-06 | End: 2022-07-06

## 2022-06-06 NOTE — TELEPHONE ENCOUNTER
Glipizide  zanaflex  Routing refill request to provider for review/approval because:  Imani given x1 and patient did not follow up, please advise  Labs not current:  CR, A1C  Patient needs to be seen because it has been more than 1 year since last office visit.    Sending to scheduling for yearly office visit due    Tori Salter RN

## 2022-06-08 DIAGNOSIS — S16.1XXA ACUTE STRAIN OF NECK MUSCLE, INITIAL ENCOUNTER: ICD-10-CM

## 2022-06-08 NOTE — TELEPHONE ENCOUNTER
Zanaflex      Last Written Prescription Date:  6/6/2022  Last Fill Quantity: 15,   # refills: 0  Last Office Visit: 4/29/2021  Future Office visit:       Routing refill request to provider for review/approval because:  Drug not on the [facility], [facility] or [facility] refill protocol or controlled substance

## 2022-07-04 DIAGNOSIS — S16.1XXA ACUTE STRAIN OF NECK MUSCLE, INITIAL ENCOUNTER: ICD-10-CM

## 2022-07-04 DIAGNOSIS — E11.69 TYPE 2 DIABETES MELLITUS WITH OTHER SPECIFIED COMPLICATION, WITHOUT LONG-TERM CURRENT USE OF INSULIN (H): ICD-10-CM

## 2022-07-06 ENCOUNTER — MYC MEDICAL ADVICE (OUTPATIENT)
Dept: FAMILY MEDICINE | Facility: CLINIC | Age: 42
End: 2022-07-06

## 2022-07-06 RX ORDER — GLIPIZIDE 10 MG/1
TABLET ORAL
Qty: 90 TABLET | Refills: 0 | Status: SHIPPED | OUTPATIENT
Start: 2022-07-06 | End: 2022-08-23

## 2022-07-06 NOTE — TELEPHONE ENCOUNTER
zanaflex  Glucotrol  Routing refill request to provider for review/approval because:  Drug not on the [facility] refill protocol   Labs not current:  A1C, CR  Patient needs to be seen because it has been more than 1 year since last office visit.    Sending to scheduling for yearly office visit due    Tori Salter RN

## 2022-07-06 NOTE — TELEPHONE ENCOUNTER
Patient informed via mychart to schedule, 7/11 reminder if not read to send letter.   Adrienne Briscoe MA

## 2022-07-06 NOTE — LETTER
[address] [address]  Phone: [phone]  Fax: [phone]    July 11, 2022      Waqas Allen                                                                                                                                [address] [address] [place] [address]      Dear Mr. Allen,    We are concerned about your health care.  We recently provided you with a medication refill.  Many medications require routine follow-up with your Doctor.       At this time we ask that: You schedule an appointment for your annual physical. Call the clinic at [phone] Option 1 to schedule.      Thank you,     Chance Trent DO  Care Team

## 2022-07-09 DIAGNOSIS — S39.012A BACK STRAIN, INITIAL ENCOUNTER: ICD-10-CM

## 2022-07-11 RX ORDER — METHOCARBAMOL 750 MG/1
TABLET, FILM COATED ORAL
Qty: 60 TABLET | Refills: 0 | Status: SHIPPED | OUTPATIENT
Start: 2022-07-11 | End: 2022-08-30

## 2022-07-11 NOTE — ANESTHESIA CARE TRANSFER NOTE
Impression: WET AMD OS CNVM 
 NEW Avastin Infr. OS x Oct '21 Plan: hx: [[LOST to f/u. . . Did NOT NOTICE the drop in [place] w [facility]/CME. UNK duration - chronic? LONG d/w pt and family. Options reviewed]]. TODAY Avastin OS (proc note). FUTURE SUSVIMO commercial OS 
    RTC  PLAN for [address] [place] [address] in order to REDUCE injx BURDEN. (ATROPHY LIMITING?) PROCEED w Benefits investigation / POSSIBLE FUTURE PDS IMPLANT OS w commercial SUSVIMO -- however, Atrophy limiting issue -- D/w'd pt and family.      Not study options but PDS implant may be helpful to reduce burden Patient: Waqas PHAM Torma    ARTHROSCOPY SHOULDER DISTAL CLAVICLE RESECTION (Right Shoulder)  ARTHROSCOPY SHOULDER DECOMPRESSION (Right Shoulder)  Additional InformationProcedure(s):  right shoulder arthroscopy distal clavicle excision and subacromial decompression    shoulder block - Wound Class: I-Clean   - Wound Class: I-Clean    Diagnosis: right ac joint arthroscopy  Diagnosis Additional Information: No value filed.    Anesthesia Type:   General, Peripheral Nerve Block, ETT     Note:  Airway :Nasopharyngeal Airway and Face Mask  Patient transferred to:PACU        Vitals: (Last set prior to Anesthesia Care Transfer)              Electronically Signed By: KITA Rivera CRNA  February 1, 2017  9:06 AM

## 2022-07-11 NOTE — TELEPHONE ENCOUNTER
Robaxin      Last Written Prescription Date:  4/20/2022  Last Fill Quantity: 60,   # refills: 0  Last Office Visit: 4/29/2021  Future Office visit:       Routing refill request to provider for review/approval because:  Drug not on the [facility], [facility] or  Health refill protocol or controlled substance    Ochoa Garcia RN

## 2022-07-30 ENCOUNTER — HEALTH MAINTENANCE LETTER (OUTPATIENT)
Age: 42
End: 2022-07-30

## 2022-08-01 DIAGNOSIS — S16.1XXA ACUTE STRAIN OF NECK MUSCLE, INITIAL ENCOUNTER: ICD-10-CM

## 2022-08-01 NOTE — TELEPHONE ENCOUNTER
Requested Prescriptions   Pending Prescriptions Disp Refills    tiZANidine (ZANAFLEX) 4 MG tablet [Pharmacy Med Name: TIZANIDINE 4MG TABLET] 15 tablet 0     Sig: TAKE 1 TABLET BY MOUTH DAILY AT BEDTIME        There is no refill protocol information for this order           Cheri Phipps, MARCION, RN

## 2022-08-19 DIAGNOSIS — E11.69 TYPE 2 DIABETES MELLITUS WITH OTHER SPECIFIED COMPLICATION, WITHOUT LONG-TERM CURRENT USE OF INSULIN (H): ICD-10-CM

## 2022-08-23 ENCOUNTER — MYC MEDICAL ADVICE (OUTPATIENT)
Dept: FAMILY MEDICINE | Facility: CLINIC | Age: 42
End: 2022-08-23

## 2022-08-23 RX ORDER — GLIPIZIDE 10 MG/1
TABLET ORAL
Qty: 90 TABLET | Refills: 0 | Status: SHIPPED | OUTPATIENT
Start: 2022-08-23 | End: 2022-09-08

## 2022-08-23 NOTE — TELEPHONE ENCOUNTER
"Routing refill request to provider for review/approval because:  Patient needs to be seen because it has been more than 1 year since last office visit.    Sending to scheduling for yearly office visit due    Requested Prescriptions   Pending Prescriptions Disp Refills    glipiZIDE (GLUCOTROL) 10 MG tablet [Pharmacy Med Name: GLIPIZIDE 10MG TABLET] 90 tablet 0     Sig: TAKE 2 TABLETS (20MG) IN THE MORNING AND TAKE 1 TABLET (10MG) IN THE EVENING BY MOUTH        Sulfonylurea Agents Failed - 8/19/2022  5:49 PM        Failed - Patient has documented A1c within the specified period of time.     If HgbA1C is 8 or greater, it needs to be on file within the past 3 months.  If less than 8, must be on file within the past 6 months.     Recent Labs   Lab Test 02/02/21  1552   A1C 9.9*             Failed - Patient has a recent creatinine (normal) within the past 12 mos.     Recent Labs   Lab Test 04/24/20  0000   CR 1.070       Ok to refill medication if creatinine is low          Failed - Recent (6 mo) or future (30 days) visit within the authorizing provider's specialty     Patient had office visit in the last 6 months or has a visit in the next 30 days with authorizing provider or within the authorizing provider's specialty.  See \"Patient Info\" tab in inbasket, or \"Choose Columns\" in Meds & Orders section of the refill encounter.            Passed - Medication is active on med list        Passed - Patient is age 18 or older                    "

## 2022-08-23 NOTE — LETTER
[address] [address]  Phone: [phone]  Fax: [phone]    August 26, 2022      Waqas Allen                                                                                                                                [id number] [address] [place] [id number]      Dear Mr. Allen,    We are concerned about your health care.  We recently provided you with a medication refill.  Many medications require routine follow-up with your Doctor.       At this time we ask that: You schedule an appointment for your annual physical. Call the clinic at [phone] Option 1 to schedule.         Thank you,     Chance Trent DO  Care Team

## 2022-08-23 NOTE — TELEPHONE ENCOUNTER
Patient informed via mychart to schedule, 8/26 reminder if not read to send letter.   Adrienne Briscoe MA

## 2022-08-27 DIAGNOSIS — I10 HYPERTENSION GOAL BP (BLOOD PRESSURE) < 140/90: ICD-10-CM

## 2022-08-27 DIAGNOSIS — S39.012A BACK STRAIN, INITIAL ENCOUNTER: ICD-10-CM

## 2022-08-30 RX ORDER — METHOCARBAMOL 750 MG/1
TABLET, FILM COATED ORAL
Qty: 60 TABLET | Refills: 0 | Status: SHIPPED | OUTPATIENT
Start: 2022-08-30 | End: 2022-10-18

## 2022-08-30 RX ORDER — LOSARTAN POTASSIUM 100 MG/1
TABLET ORAL
Qty: 90 TABLET | Refills: 1 | Status: SHIPPED | OUTPATIENT
Start: 2022-08-30 | End: 2022-11-11

## 2022-08-30 NOTE — TELEPHONE ENCOUNTER
"Routing refill request to provider for review/approval because:    Requested Prescriptions   Pending Prescriptions Disp Refills    losartan (COZAAR) 100 MG tablet [Pharmacy Med Name: LOSARTAN 100MG TABLET] 90 tablet 1     Sig: TAKE 1 TABLET BY MOUTH EVERY DAY        Angiotensin-II Receptors Failed - 8/27/2022  8:25 AM        Failed - Recent (12 mo) or future (30 days) visit within the authorizing provider's specialty     Patient has had an office visit with the authorizing provider or a provider within the authorizing providers department within the previous 12 mos or has a future within next 30 days. See \"Patient Info\" tab in inbasket, or \"Choose Columns\" in Meds & Orders section of the refill encounter.              Failed - Normal serum creatinine on file in past 12 months     Recent Labs   Lab Test 04/24/20  0000   CR 1.070       Ok to refill medication if creatinine is low          Failed - Normal serum potassium on file in past 12 months       Recent Labs   Lab Test 03/13/20  1344   POTASSIUM 3.7                    Passed - Last blood pressure under 140/90 in past 12 months       BP Readings from Last 3 Encounters:   01/31/22 130/82   12/13/21 (!) 156/96   04/29/21 132/86                 Passed - Medication is active on med list        Passed - Patient is age 18 or older           methocarbamol (ROBAXIN) 750 MG tablet [Pharmacy Med Name: METHOCARBAMOL 750MG TABLET] 60 tablet 0     Sig: TAKE 1 TABLET BY MOUTH TWICE A DAY AS NEEDED FOR MUSCLE SPASM        There is no refill protocol information for this order               "

## 2022-09-05 DIAGNOSIS — E11.69 TYPE 2 DIABETES MELLITUS WITH OTHER SPECIFIED COMPLICATION, WITHOUT LONG-TERM CURRENT USE OF INSULIN (H): ICD-10-CM

## 2022-09-07 NOTE — TELEPHONE ENCOUNTER
Pending Prescriptions:                       Disp   Refills    glipiZIDE (GLUCOTROL) 10 MG tablet [Pharma*90 tab*0        Sig: TAKE 2 TABLETS (20MG) BY MOUTH IN THE MORNING AND 1           TABLET (10MG) IN THE EVENING      Routing refill request to provider for review/approval because:  Imani given x1 and patient did not follow up, please advise  Labs not current:    Lab Results   Component Value Date    A1C 9.9 02/02/2021    A1C 9.9 03/13/2020    A1C 12.2 01/24/2020    A1C 11.3 11/13/2019    A1C 9.9 07/10/2019       Patient needs to be seen because:  DM check    Cindy Irizarry RN

## 2022-09-08 RX ORDER — GLIPIZIDE 10 MG/1
TABLET ORAL
Qty: 90 TABLET | Refills: 0 | Status: SHIPPED | OUTPATIENT
Start: 2022-09-08 | End: 2022-10-18

## 2022-09-26 DIAGNOSIS — S16.1XXA ACUTE STRAIN OF NECK MUSCLE, INITIAL ENCOUNTER: ICD-10-CM

## 2022-09-27 NOTE — TELEPHONE ENCOUNTER
Requested Prescriptions   Pending Prescriptions Disp Refills     tiZANidine (ZANAFLEX) 4 MG tablet [Pharmacy Med Name: TIZANIDINE 4MG TABLET] 15 tablet 0     Sig: TAKE 1 TABLET BY MOUTH DAILY AT BEDTIME     Routing refill request to provider for review/approval because:  Drug not on the [facility], [facility] or [facility] refill protocol or controlled substance

## 2022-10-09 ENCOUNTER — HEALTH MAINTENANCE LETTER (OUTPATIENT)
Age: 42
End: 2022-10-09

## 2022-10-16 DIAGNOSIS — E11.69 TYPE 2 DIABETES MELLITUS WITH OTHER SPECIFIED COMPLICATION, WITHOUT LONG-TERM CURRENT USE OF INSULIN (H): ICD-10-CM

## 2022-10-17 DIAGNOSIS — S39.012A BACK STRAIN, INITIAL ENCOUNTER: ICD-10-CM

## 2022-10-17 DIAGNOSIS — S16.1XXA ACUTE STRAIN OF NECK MUSCLE, INITIAL ENCOUNTER: ICD-10-CM

## 2022-10-18 RX ORDER — METHOCARBAMOL 750 MG/1
TABLET, FILM COATED ORAL
Qty: 60 TABLET | Refills: 0 | Status: SHIPPED | OUTPATIENT
Start: 2022-10-18 | End: 2022-11-29

## 2022-10-18 RX ORDER — GLIPIZIDE 10 MG/1
TABLET ORAL
Qty: 90 TABLET | Refills: 0 | Status: SHIPPED | OUTPATIENT
Start: 2022-10-18 | End: 2022-11-11

## 2022-10-18 NOTE — TELEPHONE ENCOUNTER
Robaxin  Zanaflex    Last office visit 4/29/2021  Sending to scheduling for yearly office visit due      Requested Prescriptions   Pending Prescriptions Disp Refills     tiZANidine (ZANAFLEX) 4 MG tablet [Pharmacy Med Name: TIZANIDINE 4MG TABLET] 15 tablet 0     Sig: TAKE 1 TABLET BY MOUTH AT BEDTIME       There is no refill protocol information for this order        methocarbamol (ROBAXIN) 750 MG tablet [Pharmacy Med Name: METHOCARBAMOL 750MG TABLET] 60 tablet 0     Sig: TAKE 1 TABLET BY MOUTH TWICE A DAY AS NEEDED FOR MUSCLE SPASM       There is no refill protocol information for this order

## 2022-10-18 NOTE — TELEPHONE ENCOUNTER
Routing refill request to provider for review/approval because:    Requested Prescriptions   Pending Prescriptions Disp Refills    glipiZIDE (GLUCOTROL) 10 MG tablet [Pharmacy Med Name: GLIPIZIDE 10MG TABLET] 90 tablet 0     Sig: TAKE 2 TABLETS (20MG) BY MOUTH IN THE MORNING AND 1 TABLET (10MG) IN THE EVENING       Sulfonylurea Agents Failed - 10/16/2022  5:00 AM        Failed - Patient has documented A1c within the specified period of time.     If HgbA1C is 8 or greater, it needs to be on file within the past 3 months.  If less than 8, must be on file within the past 6 months.     Recent Labs   Lab Test 02/02/21  1552   A1C 9.9*             Failed - Patient has a recent creatinine (normal) within the past 12 mos.     Recent Labs   Lab Test 04/24/20  0000   CR 1.070       Ok to refill medication if creatinine is low

## 2022-11-01 DIAGNOSIS — S16.1XXA ACUTE STRAIN OF NECK MUSCLE, INITIAL ENCOUNTER: ICD-10-CM

## 2022-11-03 NOTE — TELEPHONE ENCOUNTER
Routing refill request to provider for review/approval because:    Requested Prescriptions   Pending Prescriptions Disp Refills    tiZANidine (ZANAFLEX) 4 MG tablet [Pharmacy Med Name: TIZANIDINE 4MG TABLET] 15 tablet 0     Sig: TAKE 1 TABLET BY MOUTH AT BEDTIME       There is no refill protocol information for this order

## 2022-11-11 ENCOUNTER — OFFICE VISIT (OUTPATIENT)
Dept: INTERNAL MEDICINE | Facility: CLINIC | Age: 42
End: 2022-11-11
Payer: COMMERCIAL

## 2022-11-11 VITALS
WEIGHT: 267 LBS | TEMPERATURE: 96.7 F | RESPIRATION RATE: 10 BRPM | OXYGEN SATURATION: 98 % | HEART RATE: 84 BPM | BODY MASS INDEX: 45.58 KG/M2 | SYSTOLIC BLOOD PRESSURE: 136 MMHG | DIASTOLIC BLOOD PRESSURE: 86 MMHG | HEIGHT: 64 IN

## 2022-11-11 DIAGNOSIS — E66.01 MORBID OBESITY (H): Primary | ICD-10-CM

## 2022-11-11 DIAGNOSIS — E11.69 TYPE 2 DIABETES MELLITUS WITH OTHER SPECIFIED COMPLICATION, WITHOUT LONG-TERM CURRENT USE OF INSULIN (H): ICD-10-CM

## 2022-11-11 DIAGNOSIS — K60.30 ANAL FISTULA: ICD-10-CM

## 2022-11-11 DIAGNOSIS — I10 HYPERTENSION GOAL BP (BLOOD PRESSURE) < 140/90: ICD-10-CM

## 2022-11-11 DIAGNOSIS — Z13.220 SCREENING FOR HYPERLIPIDEMIA: ICD-10-CM

## 2022-11-11 LAB
ALBUMIN SERPL-MCNC: 3.8 G/DL (ref 3.4–5)
ALP SERPL-CCNC: 108 U/L (ref 40–150)
ALT SERPL W P-5'-P-CCNC: 19 U/L (ref 0–70)
ANION GAP SERPL CALCULATED.3IONS-SCNC: 5 MMOL/L (ref 3–14)
AST SERPL W P-5'-P-CCNC: 9 U/L (ref 0–45)
BILIRUB DIRECT SERPL-MCNC: 0.1 MG/DL (ref 0–0.2)
BILIRUB SERPL-MCNC: 0.3 MG/DL (ref 0.2–1.3)
BUN SERPL-MCNC: 16 MG/DL (ref 7–30)
CALCIUM SERPL-MCNC: 8.2 MG/DL (ref 8.5–10.1)
CHLORIDE BLD-SCNC: 100 MMOL/L (ref 94–109)
CHOLEST SERPL-MCNC: 186 MG/DL
CO2 SERPL-SCNC: 30 MMOL/L (ref 20–32)
CREAT SERPL-MCNC: 0.8 MG/DL (ref 0.66–1.25)
CREAT UR-MCNC: 73 MG/DL
FASTING STATUS PATIENT QL REPORTED: NO
GFR SERPL CREATININE-BSD FRML MDRD: >90 ML/MIN/1.73M2
GLUCOSE BLD-MCNC: 340 MG/DL (ref 70–99)
HBA1C MFR BLD: 13.5 % (ref 0–5.6)
HDLC SERPL-MCNC: 28 MG/DL
LDLC SERPL CALC-MCNC: 88 MG/DL
LDLC SERPL CALC-MCNC: ABNORMAL MG/DL
MICROALBUMIN UR-MCNC: 312 MG/L
MICROALBUMIN/CREAT UR: 427.4 MG/G CR (ref 0–17)
NONHDLC SERPL-MCNC: 158 MG/DL
POTASSIUM BLD-SCNC: 3.9 MMOL/L (ref 3.4–5.3)
PROT SERPL-MCNC: 8.1 G/DL (ref 6.8–8.8)
SODIUM SERPL-SCNC: 135 MMOL/L (ref 133–144)
TRIGL SERPL-MCNC: 761 MG/DL

## 2022-11-11 PROCEDURE — 82043 UR ALBUMIN QUANTITATIVE: CPT | Performed by: INTERNAL MEDICINE

## 2022-11-11 PROCEDURE — 82248 BILIRUBIN DIRECT: CPT | Performed by: INTERNAL MEDICINE

## 2022-11-11 PROCEDURE — 99396 PREV VISIT EST AGE 40-64: CPT | Performed by: INTERNAL MEDICINE

## 2022-11-11 PROCEDURE — 80061 LIPID PANEL: CPT | Performed by: INTERNAL MEDICINE

## 2022-11-11 PROCEDURE — 80053 COMPREHEN METABOLIC PANEL: CPT | Performed by: INTERNAL MEDICINE

## 2022-11-11 PROCEDURE — 83036 HEMOGLOBIN GLYCOSYLATED A1C: CPT | Performed by: INTERNAL MEDICINE

## 2022-11-11 PROCEDURE — 36415 COLL VENOUS BLD VENIPUNCTURE: CPT | Performed by: INTERNAL MEDICINE

## 2022-11-11 PROCEDURE — 83721 ASSAY OF BLOOD LIPOPROTEIN: CPT | Mod: 59 | Performed by: INTERNAL MEDICINE

## 2022-11-11 RX ORDER — MELOXICAM 15 MG/1
TABLET ORAL
COMMUNITY
Start: 2022-10-24

## 2022-11-11 RX ORDER — RIFAMPIN 300 MG/1
300 CAPSULE ORAL 2 TIMES DAILY
COMMUNITY
Start: 2022-11-02

## 2022-11-11 RX ORDER — GLIPIZIDE 10 MG/1
TABLET ORAL
Qty: 90 TABLET | Refills: 3 | Status: SHIPPED | OUTPATIENT
Start: 2022-11-11 | End: 2023-03-14

## 2022-11-11 RX ORDER — LOSARTAN POTASSIUM 100 MG/1
100 TABLET ORAL DAILY
Qty: 90 TABLET | Refills: 3 | Status: SHIPPED | OUTPATIENT
Start: 2022-11-11 | End: 2023-11-08

## 2022-11-11 RX ORDER — CLINDAMYCIN HCL 300 MG
CAPSULE ORAL
COMMUNITY
Start: 2022-11-02

## 2022-11-11 ASSESSMENT — ENCOUNTER SYMPTOMS
DIARRHEA: 0
SHORTNESS OF BREATH: 0
FEVER: 0
ARTHRALGIAS: 1
SORE THROAT: 0
JOINT SWELLING: 0
CHILLS: 0
ABDOMINAL PAIN: 1
MYALGIAS: 1
FREQUENCY: 0
EYE PAIN: 0
HEMATURIA: 0
NERVOUS/ANXIOUS: 0
HEMATOCHEZIA: 0
DIZZINESS: 0
HEARTBURN: 1
PALPITATIONS: 0
PARESTHESIAS: 0
DYSURIA: 0
WEAKNESS: 0
CONSTIPATION: 0
NAUSEA: 0
HEADACHES: 1
COUGH: 0
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
## 2022-11-26 DIAGNOSIS — S39.012A BACK STRAIN, INITIAL ENCOUNTER: ICD-10-CM

## 2022-11-29 RX ORDER — METHOCARBAMOL 750 MG/1
TABLET, FILM COATED ORAL
Qty: 60 TABLET | Refills: 0 | Status: SHIPPED | OUTPATIENT
Start: 2022-11-29 | End: 2023-01-26

## 2022-11-29 NOTE — TELEPHONE ENCOUNTER
Routing refill request to provider for review/approval because:    Requested Prescriptions   Pending Prescriptions Disp Refills    methocarbamol (ROBAXIN) 750 MG tablet [Pharmacy Med Name: METHOCARBAMOL 750MG TABLET] 60 tablet 0     Sig: TAKE 1 TABLET BY MOUTH TWICE A DAY AS NEEDED FOR MUSCLE SPASM       There is no refill protocol information for this order

## 2022-12-05 DIAGNOSIS — S16.1XXA ACUTE STRAIN OF NECK MUSCLE, INITIAL ENCOUNTER: ICD-10-CM

## 2022-12-06 NOTE — TELEPHONE ENCOUNTER
Pending Prescriptions:                       Disp   Refills    tiZANidine (ZANAFLEX) 4 MG tablet [Pharmac*15 tab*0        Sig: TAKE 1 TABLET BY MOUTH DAILY AT BEDTIME      Routing refill request to provider for review/approval because:  Drug not on the [facility] refill protocol   Drug not active on patient's medication list    Cindy Irizarry RN

## 2022-12-28 DIAGNOSIS — S16.1XXA ACUTE STRAIN OF NECK MUSCLE, INITIAL ENCOUNTER: ICD-10-CM

## 2022-12-29 NOTE — TELEPHONE ENCOUNTER
Requested Prescriptions   Pending Prescriptions Disp Refills    tiZANidine (ZANAFLEX) 4 MG tablet [Pharmacy Med Name: TIZANIDINE 4MG TABLET] 15 tablet 0     Sig: TAKE 1 TABLET BY MOUTH EVERY DAY AT BEDTIME       There is no refill protocol information for this order          Cheri Phipps, MARCION, RN

## 2023-01-25 DIAGNOSIS — S39.012A BACK STRAIN, INITIAL ENCOUNTER: ICD-10-CM

## 2023-01-26 RX ORDER — METHOCARBAMOL 750 MG/1
TABLET, FILM COATED ORAL
Qty: 60 TABLET | Refills: 0 | Status: SHIPPED | OUTPATIENT
Start: 2023-01-26 | End: 2023-03-27

## 2023-01-26 NOTE — TELEPHONE ENCOUNTER
Requested Prescriptions   Pending Prescriptions Disp Refills     methocarbamol (ROBAXIN) 750 MG tablet [Pharmacy Med Name: METHOCARBAMOL 750MG TABLET] 60 tablet 0     Sig: TAKE 1 TABLET BY MOUTH TWICE A DAY AS NEEDED FOR MUSCLE SPASM     Routing refill request to provider for review/approval because:  Drug not on the [facility], [facility] or [facility] refill protocol or controlled substance

## 2023-01-31 ENCOUNTER — MYC MEDICAL ADVICE (OUTPATIENT)
Dept: INTERNAL MEDICINE | Facility: CLINIC | Age: 43
End: 2023-01-31
Payer: COMMERCIAL

## 2023-01-31 DIAGNOSIS — S16.1XXA ACUTE STRAIN OF NECK MUSCLE, INITIAL ENCOUNTER: ICD-10-CM

## 2023-01-31 NOTE — TELEPHONE ENCOUNTER
Requested Prescriptions   Pending Prescriptions Disp Refills    tiZANidine (ZANAFLEX) 4 MG tablet 15 tablet 0     Sig: TAKE 1 TABLET BY MOUTH EVERY DAY AT BEDTIME       There is no refill protocol information for this order

## 2023-01-31 NOTE — TELEPHONE ENCOUNTER
Prescription for tizanidine pended for completion if appropriate.  Patient requesting a 30 day supply vs 15 (this has not been updated on script).

## 2023-02-06 ENCOUNTER — MYC MEDICAL ADVICE (OUTPATIENT)
Dept: INTERNAL MEDICINE | Facility: CLINIC | Age: 43
End: 2023-02-06
Payer: COMMERCIAL

## 2023-02-06 NOTE — TELEPHONE ENCOUNTER
Why, please give me reason why you will not give me a 30 day supply of Tizanidine. What is your reason behind this. I am very confused and frustrated by this.      Chris              Mostly because it was supposed to be for short-term usage.    Additionally, you should not be taking it concurrently with the Robaxin/methocarbamol.    These are both muscle relaxers.    Please let me know which one you intend to discontinue.    Miley

## 2023-02-18 ENCOUNTER — HEALTH MAINTENANCE LETTER (OUTPATIENT)
Age: 43
End: 2023-02-18

## 2023-03-13 DIAGNOSIS — E11.69 TYPE 2 DIABETES MELLITUS WITH OTHER SPECIFIED COMPLICATION, WITHOUT LONG-TERM CURRENT USE OF INSULIN (H): ICD-10-CM

## 2023-03-14 RX ORDER — GLIPIZIDE 10 MG/1
TABLET ORAL
Qty: 90 TABLET | Refills: 3 | Status: SHIPPED | OUTPATIENT
Start: 2023-03-14 | End: 2023-07-12

## 2023-03-14 NOTE — TELEPHONE ENCOUNTER
Routing refill request to provider for review/approval because:    Requested Prescriptions   Pending Prescriptions Disp Refills    glipiZIDE (GLUCOTROL) 10 MG tablet [Pharmacy Med Name: GLIPIZIDE 10MG TABLET] 90 tablet 3     Sig: TAKE 2 TABLETS (20MG) BY MOUTH IN THE MORNING AND 1 TABLET (10MG) IN THE EVENING       Sulfonylurea Agents Failed - 3/13/2023  1:22 AM        Failed - Patient has documented A1c within the specified period of time.     If HgbA1C is 8 or greater, it needs to be on file within the past 3 months.  If less than 8, must be on file within the past 6 months.     Recent Labs   Lab Test 11/11/22  1345   A1C 13.5*

## 2023-03-25 DIAGNOSIS — S39.012A BACK STRAIN, INITIAL ENCOUNTER: ICD-10-CM

## 2023-03-27 RX ORDER — METHOCARBAMOL 750 MG/1
TABLET, FILM COATED ORAL
Qty: 60 TABLET | Refills: 0 | Status: SHIPPED | OUTPATIENT
Start: 2023-03-27 | End: 2023-06-02

## 2023-03-27 NOTE — TELEPHONE ENCOUNTER
Pending Prescriptions:                       Disp   Refills    methocarbamol (ROBAXIN) 750 MG tablet [Pha*60 tab*0        Sig: TAKE 1 TABLET BY MOUTH TWICE A DAY AS NEEDED FOR           MUSCLE SPASM    Routing refill request to provider for review/approval because:  Drug not on the [facility] refill protocol

## 2023-05-16 NOTE — TELEPHONE ENCOUNTER
Five days of Robaxin sent to [facility]. If not better patient needs apt.  m   From: Andrew Richey  To: Michael Lynch MD  Sent: 5/16/2023 6:28 AM CDT  Subject: Adderall    Good morning,  Could I please have a refill of my adderal. The pharmacy could only dispense 10 for me last time and said I would have to ask for another script. I am terrible about requesting refills. Could we put a couple refills on this prescription please?

## 2023-05-21 ENCOUNTER — HEALTH MAINTENANCE LETTER (OUTPATIENT)
Age: 43
End: 2023-05-21

## 2023-06-01 DIAGNOSIS — S39.012A BACK STRAIN, INITIAL ENCOUNTER: ICD-10-CM

## 2023-06-02 RX ORDER — METHOCARBAMOL 750 MG/1
TABLET, FILM COATED ORAL
Qty: 60 TABLET | Refills: 0 | Status: SHIPPED | OUTPATIENT
Start: 2023-06-02 | End: 2023-07-12

## 2023-06-02 NOTE — TELEPHONE ENCOUNTER
Pending Prescriptions:                       Disp   Refills    methocarbamol (ROBAXIN) 750 MG tablet      60 tab*0            Routing refill request to provider for review/approval because:  Drug not on the [facility] refill protocol     Tyra Ortega RN on 6/2/2023 at 3:00 PM

## 2023-07-11 DIAGNOSIS — S39.012A BACK STRAIN, INITIAL ENCOUNTER: ICD-10-CM

## 2023-07-11 DIAGNOSIS — E11.69 TYPE 2 DIABETES MELLITUS WITH OTHER SPECIFIED COMPLICATION, WITHOUT LONG-TERM CURRENT USE OF INSULIN (H): ICD-10-CM

## 2023-07-12 RX ORDER — GLIPIZIDE 10 MG/1
TABLET ORAL
Qty: 90 TABLET | Refills: 3 | Status: SHIPPED | OUTPATIENT
Start: 2023-07-12 | End: 2023-11-13

## 2023-07-12 RX ORDER — METHOCARBAMOL 750 MG/1
TABLET, FILM COATED ORAL
Qty: 60 TABLET | Refills: 0 | Status: SHIPPED | OUTPATIENT
Start: 2023-07-12 | End: 2023-10-02

## 2023-10-02 DIAGNOSIS — S39.012A BACK STRAIN, INITIAL ENCOUNTER: ICD-10-CM

## 2023-10-02 RX ORDER — METHOCARBAMOL 750 MG/1
TABLET, FILM COATED ORAL
Qty: 60 TABLET | Refills: 0 | Status: SHIPPED | OUTPATIENT
Start: 2023-10-02

## 2023-10-02 NOTE — LETTER
October 4, 2023      Waqas Allen  [address] [place] [id number]        Dear Waqas,     We are concerned about your health care.  We recently provided you with a medication refill.  Many medications require routine follow-up with your Doctor.      At this time we ask that: You schedule a routine office visit with your physician to follow your medications.  Call the clinic at [phone] Option 1 to schedule.     Your prescription:  Need to set up a face-to-face appointment with me prior to any subsequent refills.       Sincerely,        Chance Trent, DO  Care Team

## 2023-10-02 NOTE — TELEPHONE ENCOUNTER
The patient will need to set up a face-to-face appointment with me prior to any subsequent refills.    Please help the patient set up an appointment.    Thank you.  Miley

## 2023-10-03 NOTE — TELEPHONE ENCOUNTER
Called and LM For patient to call back. Please relay below and help schedule.   Adrienne Briscoe MA

## 2023-10-13 ENCOUNTER — PATIENT OUTREACH (OUTPATIENT)
Dept: CARE COORDINATION | Facility: CLINIC | Age: 43
End: 2023-10-13
Payer: COMMERCIAL

## 2023-10-27 ENCOUNTER — PATIENT OUTREACH (OUTPATIENT)
Dept: CARE COORDINATION | Facility: CLINIC | Age: 43
End: 2023-10-27
Payer: COMMERCIAL

## 2023-10-28 ENCOUNTER — HEALTH MAINTENANCE LETTER (OUTPATIENT)
Age: 43
End: 2023-10-28

## 2023-11-08 DIAGNOSIS — I10 HYPERTENSION GOAL BP (BLOOD PRESSURE) < 140/90: ICD-10-CM

## 2023-11-08 RX ORDER — LOSARTAN POTASSIUM 100 MG/1
100 TABLET ORAL DAILY
Qty: 90 TABLET | Refills: 0 | Status: SHIPPED | OUTPATIENT
Start: 2023-11-08 | End: 2023-12-20

## 2023-11-13 DIAGNOSIS — E11.69 TYPE 2 DIABETES MELLITUS WITH OTHER SPECIFIED COMPLICATION, WITHOUT LONG-TERM CURRENT USE OF INSULIN (H): ICD-10-CM

## 2023-11-13 RX ORDER — GLIPIZIDE 10 MG/1
TABLET ORAL
Qty: 90 TABLET | Refills: 0 | Status: SHIPPED | OUTPATIENT
Start: 2023-11-13 | End: 2023-12-20

## 2023-11-13 NOTE — TELEPHONE ENCOUNTER
Requested Prescriptions   Pending Prescriptions Disp Refills    glipiZIDE (GLUCOTROL) 10 MG tablet 90 tablet 3     Sig: TAKE 2 TABLETS (20MG) BY MOUTH IN THE MORNING AND 1 TABLET (10MG) IN THE EVENING       There is no refill protocol information for this order        Gretta Dunn MA

## 2023-11-14 ENCOUNTER — MYC MEDICAL ADVICE (OUTPATIENT)
Dept: FAMILY MEDICINE | Facility: CLINIC | Age: 43
End: 2023-11-14
Payer: COMMERCIAL

## 2023-11-14 NOTE — TELEPHONE ENCOUNTER
Patient notified via mychart of the note below. Reminder set for 3 days to send letter if not read.

## 2023-11-14 NOTE — LETTER
Hcris,    We are concerned about your health care.  We recently provided you with a medication refill.  Many medications require routine follow-up with your Doctor.       At this time we ask that: You schedule a routine office visit with your physician to follow your medications.  Call the clinic at [phone] Option 1 to schedule.      Your prescription:  Has been filled. Please schedule a follow up visit for first available appointment. Courtesy refills will be given if needed until your scheduled appointment.         Thank you   [facility] Care Team  [phone]

## 2023-11-24 DIAGNOSIS — E11.69 TYPE 2 DIABETES MELLITUS WITH OTHER SPECIFIED COMPLICATION, WITHOUT LONG-TERM CURRENT USE OF INSULIN (H): ICD-10-CM

## 2023-11-24 RX ORDER — GLIPIZIDE 10 MG/1
TABLET ORAL
Qty: 90 TABLET | Refills: 0 | OUTPATIENT
Start: 2023-11-24

## 2023-12-20 DIAGNOSIS — I10 HYPERTENSION GOAL BP (BLOOD PRESSURE) < 140/90: ICD-10-CM

## 2023-12-20 DIAGNOSIS — E11.69 TYPE 2 DIABETES MELLITUS WITH OTHER SPECIFIED COMPLICATION, WITHOUT LONG-TERM CURRENT USE OF INSULIN (H): ICD-10-CM

## 2023-12-20 RX ORDER — GLIPIZIDE 10 MG/1
TABLET ORAL
Qty: 90 TABLET | Refills: 1 | Status: SHIPPED | OUTPATIENT
Start: 2023-12-20 | End: 2024-01-31

## 2023-12-20 RX ORDER — LOSARTAN POTASSIUM 100 MG/1
100 TABLET ORAL DAILY
Qty: 60 TABLET | Refills: 0 | Status: SHIPPED | OUTPATIENT
Start: 2023-12-20 | End: 2024-01-31

## 2024-01-06 ENCOUNTER — HEALTH MAINTENANCE LETTER (OUTPATIENT)
Age: 44
End: 2024-01-06

## 2024-01-31 DIAGNOSIS — E11.69 TYPE 2 DIABETES MELLITUS WITH OTHER SPECIFIED COMPLICATION, WITHOUT LONG-TERM CURRENT USE OF INSULIN (H): ICD-10-CM

## 2024-01-31 DIAGNOSIS — I10 HYPERTENSION GOAL BP (BLOOD PRESSURE) < 140/90: ICD-10-CM

## 2024-01-31 RX ORDER — LOSARTAN POTASSIUM 100 MG/1
100 TABLET ORAL DAILY
Qty: 30 TABLET | Refills: 1 | Status: SHIPPED | OUTPATIENT
Start: 2024-01-31 | End: 2024-04-18

## 2024-01-31 RX ORDER — GLIPIZIDE 10 MG/1
TABLET ORAL
Qty: 90 TABLET | Refills: 1 | Status: SHIPPED | OUTPATIENT
Start: 2024-01-31 | End: 2024-04-18

## 2024-02-01 ENCOUNTER — MYC MEDICAL ADVICE (OUTPATIENT)
Dept: FAMILY MEDICINE | Facility: CLINIC | Age: 44
End: 2024-02-01

## 2024-02-01 NOTE — TELEPHONE ENCOUNTER
Attempted to reach patient by phone, no voice mailbox set up. [employer] message sent as well with a 2/6 reminder if not read to send letter.     Closing encounter.   Adrienne Briscoe MA

## 2024-02-01 NOTE — LETTER
2/1/2024        RE: Waqas Allen  [address] [place] [phone]      Chris,      We are concerned about your health care.  We recently provided you with a medication refill.  Many medications require routine follow-up with your Doctor.       At this time we ask that: You schedule a routine office visit with your physician to follow your medications with a Face to Face appointment.  Call the clinic at [phone] Option 1 to schedule.      Your prescription:  Medications have been refilled for one time, until appointment is made.         Thank you,     Chance Trent DO/ Care Team

## 2024-03-16 ENCOUNTER — HEALTH MAINTENANCE LETTER (OUTPATIENT)
Age: 44
End: 2024-03-16

## 2024-04-18 ENCOUNTER — MYC MEDICAL ADVICE (OUTPATIENT)
Dept: FAMILY MEDICINE | Facility: CLINIC | Age: 44
End: 2024-04-18

## 2024-04-18 NOTE — LETTER
April 24, 2024      Waqas Allen  [address] [place] [phone]        Dear Waqas,       We are concerned about your health care.  We recently provided you with a medication refill.  Many medications require routine follow-up with your Doctor.      At this time we ask that: You schedule an appointment for your annual physical. Call the clinic at [phone] Option 1 to schedule.     Your prescription:  No further refills will be given until your follow up care is completed.       Thank you,   [facility] Team

## 2024-05-28 NOTE — ANESTHESIA POSTPROCEDURE EVALUATION
Patient: Waqas PHAM Torma    ARTHROSCOPY SHOULDER DISTAL CLAVICLE RESECTION (Right Shoulder)  ARTHROSCOPY SHOULDER DECOMPRESSION (Right Shoulder)  Additional InformationProcedure(s):  right shoulder arthroscopy distal clavicle excision and subacromial decompression    shoulder block - Wound Class: I-Clean   - Wound Class: I-Clean    Diagnosis:right ac joint arthroscopy  Diagnosis Additional Information: No value filed.    Anesthesia Type:  General, Peripheral Nerve Block, ETT    Note:  Anesthesia Post Evaluation    Patient location during evaluation: PACU  Patient participation: Able to fully participate in evaluation  Level of consciousness: awake and alert  Pain management: adequate  Airway patency: patent  Cardiovascular status: acceptable  Respiratory status: acceptable  Hydration status: acceptable  PONV: none     Anesthetic complications: None    Comments: Block working well,  Pt told to take his regularly scheduled medications.        Last vitals:  Filed Vitals:    02/01/17 0946 02/01/17 1000 02/01/17 1015   BP: 150/91 144/81 144/78   Temp:      Resp: 23     SpO2: 98% 93% 92%       Electronically Signed By: KITA Rivera CRNA  February 1, 2017  11:30 AM   normal

## 2024-08-03 ENCOUNTER — HEALTH MAINTENANCE LETTER (OUTPATIENT)
Age: 44
End: 2024-08-03

## 2024-12-21 ENCOUNTER — HEALTH MAINTENANCE LETTER (OUTPATIENT)
Age: 44
End: 2024-12-21

## 2025-01-25 ENCOUNTER — HEALTH MAINTENANCE LETTER (OUTPATIENT)
Age: 45
End: 2025-01-25

## 2025-03-22 ENCOUNTER — HEALTH MAINTENANCE LETTER (OUTPATIENT)
Age: 45
End: 2025-03-22

## 2025-07-05 ENCOUNTER — HEALTH MAINTENANCE LETTER (OUTPATIENT)
Age: 45
End: 2025-07-05

## (undated) DEVICE — STOCKING SLEEVE COMPRESSION CALF MED

## (undated) DEVICE — TAPE MEDIPORE 3"

## (undated) DEVICE — NDL SPINAL 18GA 3.5" 405184

## (undated) DEVICE — SOL NACL 0.9% IRRIG 3000ML BAG 07972-08

## (undated) DEVICE — DRAPE CONVERTORS U-DRAPE 60X72" 8476

## (undated) DEVICE — SU NDL MAYO TROCAR MED 217003

## (undated) DEVICE — BUR OSTEON ROUND FLUTED 5X38MM 5056-126

## (undated) DEVICE — BLADE SAW SAGITTAL 25.5X9.5X.4MM FINE LINVATEC 5023-138

## (undated) DEVICE — WRAP MCCONNELL ARM SUPPORT LG 12-401

## (undated) DEVICE — TUBING SUCTION 12"X1/4" N612

## (undated) DEVICE — SU ETHILON 3-0 PS-2 18" 1669H

## (undated) DEVICE — DRAPE SHEET REV FOLD 3/4 9349

## (undated) DEVICE — BLADE KNIFE SURG 11 371111

## (undated) DEVICE — DRSG XEROFORM 1X8"

## (undated) DEVICE — DRAPE POUCH IRR 1016

## (undated) DEVICE — Device

## (undated) DEVICE — BUR STRK 5.0MM BARREL 12 FLURE 0375951112

## (undated) DEVICE — SOL ISOPROPYL ALCOHOL USP 70% 16OZ  NDC10565-002-16 D0022

## (undated) DEVICE — DRAPE MAYO STAND 23X54 8337

## (undated) DEVICE — IMM KIT SHOULDER STABILIZATION 7210573

## (undated) DEVICE — PACK OPEN SHOULDER SOP15OCFSC

## (undated) DEVICE — GLOVE PROTEXIS W/NEU-THERA 8.0  2D73TE80

## (undated) DEVICE — ESU ELECTRODE SERFAS ABLATION 90D-S 3.5MM LATEX

## (undated) DEVICE — NDL ARTHREX MULTIFIRE/FASTPASS SCORPION AR-13995N

## (undated) DEVICE — DRAPE U SPLIT 74X120" 29440

## (undated) DEVICE — ARTHROSCOPIC CANNULA 5.5X70MM GRAY  9718

## (undated) DEVICE — IMM KIT SHOULDER TMAX MASK FACE 7210559

## (undated) DEVICE — GLOVE PROTEXIS BLUE W/NEU-THERA 8.5  2D73EB85

## (undated) DEVICE — BLADE SHAVER ARTHRO 4MM TOMCAT

## (undated) DEVICE — SOL WATER IRRIG 1000ML BOTTLE 07139-09

## (undated) DEVICE — TUBING INFLOW & OUTFLOW INTEGRATED CROSSFLOW 0450-000-300

## (undated) RX ORDER — LIDOCAINE HYDROCHLORIDE 10 MG/ML
INJECTION, SOLUTION EPIDURAL; INFILTRATION; INTRACAUDAL; PERINEURAL
Status: DISPENSED
Start: 2017-02-01